# Patient Record
Sex: MALE | Race: WHITE | NOT HISPANIC OR LATINO | Employment: FULL TIME | URBAN - METROPOLITAN AREA
[De-identification: names, ages, dates, MRNs, and addresses within clinical notes are randomized per-mention and may not be internally consistent; named-entity substitution may affect disease eponyms.]

---

## 2021-05-20 LAB — CRC RECOMMENDATION EXT: NORMAL

## 2024-04-21 ENCOUNTER — HOSPITAL ENCOUNTER (INPATIENT)
Facility: HOSPITAL | Age: 64
LOS: 1 days | Discharge: HOME | DRG: 065 | End: 2024-04-22
Attending: STUDENT IN AN ORGANIZED HEALTH CARE EDUCATION/TRAINING PROGRAM | Admitting: PSYCHIATRY & NEUROLOGY
Payer: MEDICARE

## 2024-04-21 DIAGNOSIS — R29.818 ACUTE FOCAL NEUROLOGICAL DEFICIT: ICD-10-CM

## 2024-04-21 DIAGNOSIS — I63.81 LACUNAR INFARCT, ACUTE: Primary | ICD-10-CM

## 2024-04-21 DIAGNOSIS — I63.9 STROKE: ICD-10-CM

## 2024-04-21 PROBLEM — F17.200 TOBACCO USE DISORDER, SEVERE, DEPENDENCE: Status: ACTIVE | Noted: 2020-12-07

## 2024-04-21 PROBLEM — I10 ESSENTIAL HYPERTENSION: Status: ACTIVE | Noted: 2023-02-06

## 2024-04-21 PROBLEM — R00.2 PALPITATIONS: Status: ACTIVE | Noted: 2023-02-06

## 2024-04-21 PROBLEM — Z72.0 TOBACCO USE: Status: ACTIVE | Noted: 2024-02-22

## 2024-04-21 PROBLEM — I48.0 PAROXYSMAL ATRIAL FIBRILLATION: Status: ACTIVE | Noted: 2023-04-21

## 2024-04-21 LAB
ALBUMIN SERPL BCP-MCNC: 3.9 G/DL (ref 3.5–5.2)
ALP SERPL-CCNC: 71 U/L (ref 55–135)
ALT SERPL W/O P-5'-P-CCNC: 14 U/L (ref 10–44)
ANION GAP SERPL CALC-SCNC: 13 MMOL/L (ref 8–16)
APTT PPP: 31.9 SEC (ref 21–32)
AST SERPL-CCNC: 21 U/L (ref 10–40)
BASOPHILS # BLD AUTO: 0.06 K/UL (ref 0–0.2)
BASOPHILS NFR BLD: 0.8 % (ref 0–1.9)
BILIRUB SERPL-MCNC: 0.3 MG/DL (ref 0.1–1)
BUN SERPL-MCNC: 20 MG/DL (ref 8–23)
CALCIUM SERPL-MCNC: 9.6 MG/DL (ref 8.7–10.5)
CHLORIDE SERPL-SCNC: 106 MMOL/L (ref 95–110)
CHOLEST SERPL-MCNC: 216 MG/DL (ref 120–199)
CHOLEST/HDLC SERPL: 4.2 {RATIO} (ref 2–5)
CO2 SERPL-SCNC: 22 MMOL/L (ref 23–29)
CREAT SERPL-MCNC: 1.1 MG/DL (ref 0.5–1.4)
DIFFERENTIAL METHOD BLD: NORMAL
EOSINOPHIL # BLD AUTO: 0.3 K/UL (ref 0–0.5)
EOSINOPHIL NFR BLD: 4.2 % (ref 0–8)
ERYTHROCYTE [DISTWIDTH] IN BLOOD BY AUTOMATED COUNT: 12.7 % (ref 11.5–14.5)
EST. GFR  (NO RACE VARIABLE): >60 ML/MIN/1.73 M^2
GLUCOSE SERPL-MCNC: 79 MG/DL (ref 70–110)
HCT VFR BLD AUTO: 50.5 % (ref 40–54)
HCV AB SERPL QL IA: NORMAL
HDLC SERPL-MCNC: 51 MG/DL (ref 40–75)
HDLC SERPL: 23.6 % (ref 20–50)
HGB BLD-MCNC: 17.7 G/DL (ref 14–18)
HIV 1+2 AB+HIV1 P24 AG SERPL QL IA: NORMAL
IMM GRANULOCYTES # BLD AUTO: 0.02 K/UL (ref 0–0.04)
IMM GRANULOCYTES NFR BLD AUTO: 0.3 % (ref 0–0.5)
LDLC SERPL CALC-MCNC: 114 MG/DL (ref 63–159)
LYMPHOCYTES # BLD AUTO: 2.2 K/UL (ref 1–4.8)
LYMPHOCYTES NFR BLD: 29.9 % (ref 18–48)
MCH RBC QN AUTO: 30.5 PG (ref 27–31)
MCHC RBC AUTO-ENTMCNC: 35 G/DL (ref 32–36)
MCV RBC AUTO: 87 FL (ref 82–98)
MONOCYTES # BLD AUTO: 0.7 K/UL (ref 0.3–1)
MONOCYTES NFR BLD: 10.1 % (ref 4–15)
NEUTROPHILS # BLD AUTO: 3.9 K/UL (ref 1.8–7.7)
NEUTROPHILS NFR BLD: 54.7 % (ref 38–73)
NONHDLC SERPL-MCNC: 165 MG/DL
NRBC BLD-RTO: 0 /100 WBC
PLATELET # BLD AUTO: 206 K/UL (ref 150–450)
PMV BLD AUTO: 12 FL (ref 9.2–12.9)
POTASSIUM SERPL-SCNC: 3.8 MMOL/L (ref 3.5–5.1)
PROT SERPL-MCNC: 7.8 G/DL (ref 6–8.4)
RBC # BLD AUTO: 5.8 M/UL (ref 4.6–6.2)
SODIUM SERPL-SCNC: 141 MMOL/L (ref 136–145)
TRIGL SERPL-MCNC: 255 MG/DL (ref 30–150)
TSH SERPL DL<=0.005 MIU/L-ACNC: 0.5 UIU/ML (ref 0.4–4)
WBC # BLD AUTO: 7.2 K/UL (ref 3.9–12.7)

## 2024-04-21 PROCEDURE — 86803 HEPATITIS C AB TEST: CPT | Performed by: PHYSICIAN ASSISTANT

## 2024-04-21 PROCEDURE — 87389 HIV-1 AG W/HIV-1&-2 AB AG IA: CPT | Performed by: PHYSICIAN ASSISTANT

## 2024-04-21 PROCEDURE — 25000003 PHARM REV CODE 250

## 2024-04-21 PROCEDURE — 11000001 HC ACUTE MED/SURG PRIVATE ROOM

## 2024-04-21 PROCEDURE — 85730 THROMBOPLASTIN TIME PARTIAL: CPT | Performed by: PHYSICIAN ASSISTANT

## 2024-04-21 PROCEDURE — 80061 LIPID PANEL: CPT | Performed by: PHYSICIAN ASSISTANT

## 2024-04-21 PROCEDURE — 99223 1ST HOSP IP/OBS HIGH 75: CPT | Mod: ,,, | Performed by: PSYCHIATRY & NEUROLOGY

## 2024-04-21 PROCEDURE — 99285 EMERGENCY DEPT VISIT HI MDM: CPT | Mod: 25

## 2024-04-21 PROCEDURE — 93005 ELECTROCARDIOGRAM TRACING: CPT

## 2024-04-21 PROCEDURE — 85025 COMPLETE CBC W/AUTO DIFF WBC: CPT | Performed by: PHYSICIAN ASSISTANT

## 2024-04-21 PROCEDURE — 80053 COMPREHEN METABOLIC PANEL: CPT | Performed by: PHYSICIAN ASSISTANT

## 2024-04-21 PROCEDURE — 25000003 PHARM REV CODE 250: Performed by: PHYSICIAN ASSISTANT

## 2024-04-21 PROCEDURE — 93010 ELECTROCARDIOGRAM REPORT: CPT | Mod: ,,, | Performed by: INTERNAL MEDICINE

## 2024-04-21 PROCEDURE — 84443 ASSAY THYROID STIM HORMONE: CPT | Performed by: PHYSICIAN ASSISTANT

## 2024-04-21 PROCEDURE — S4991 NICOTINE PATCH NONLEGEND: HCPCS | Performed by: PHYSICIAN ASSISTANT

## 2024-04-21 RX ORDER — VARENICLINE TARTRATE 0.5 (11)-1
KIT ORAL
Status: ON HOLD | COMMUNITY
Start: 2024-02-22 | End: 2024-04-22 | Stop reason: HOSPADM

## 2024-04-21 RX ORDER — LISINOPRIL 5 MG/1
10 TABLET ORAL DAILY
Status: DISCONTINUED | OUTPATIENT
Start: 2024-04-22 | End: 2024-04-22 | Stop reason: HOSPADM

## 2024-04-21 RX ORDER — METOPROLOL SUCCINATE 25 MG/1
12.5 TABLET, EXTENDED RELEASE ORAL
COMMUNITY
End: 2024-05-13 | Stop reason: SDUPTHER

## 2024-04-21 RX ORDER — BISACODYL 10 MG/1
10 SUPPOSITORY RECTAL DAILY PRN
Status: DISCONTINUED | OUTPATIENT
Start: 2024-04-21 | End: 2024-04-22 | Stop reason: HOSPADM

## 2024-04-21 RX ORDER — APIXABAN 5 MG/1
5 TABLET, FILM COATED ORAL 2 TIMES DAILY
COMMUNITY
End: 2024-05-13 | Stop reason: SDUPTHER

## 2024-04-21 RX ORDER — IBUPROFEN 200 MG
1 TABLET ORAL
Status: DISCONTINUED | OUTPATIENT
Start: 2024-04-21 | End: 2024-04-21

## 2024-04-21 RX ORDER — CALCIUM CARBONATE 200(500)MG
500 TABLET,CHEWABLE ORAL 3 TIMES DAILY PRN
Status: DISCONTINUED | OUTPATIENT
Start: 2024-04-21 | End: 2024-04-22 | Stop reason: HOSPADM

## 2024-04-21 RX ORDER — LABETALOL HCL 20 MG/4 ML
10 SYRINGE (ML) INTRAVENOUS
Status: DISCONTINUED | OUTPATIENT
Start: 2024-04-21 | End: 2024-04-22 | Stop reason: HOSPADM

## 2024-04-21 RX ORDER — SODIUM CHLORIDE 0.9 % (FLUSH) 0.9 %
10 SYRINGE (ML) INJECTION
Status: DISCONTINUED | OUTPATIENT
Start: 2024-04-21 | End: 2024-04-22 | Stop reason: HOSPADM

## 2024-04-21 RX ORDER — LISINOPRIL 10 MG/1
10 TABLET ORAL
COMMUNITY
End: 2024-05-13 | Stop reason: SDUPTHER

## 2024-04-21 RX ORDER — AMOXICILLIN 250 MG
1 CAPSULE ORAL 2 TIMES DAILY
Status: DISCONTINUED | OUTPATIENT
Start: 2024-04-21 | End: 2024-04-22 | Stop reason: HOSPADM

## 2024-04-21 RX ORDER — ATORVASTATIN CALCIUM 40 MG/1
80 TABLET, FILM COATED ORAL DAILY
Status: DISCONTINUED | OUTPATIENT
Start: 2024-04-22 | End: 2024-04-22 | Stop reason: HOSPADM

## 2024-04-21 RX ORDER — POLYETHYLENE GLYCOL 3350 17 G/17G
17 POWDER, FOR SOLUTION ORAL DAILY
Status: DISCONTINUED | OUTPATIENT
Start: 2024-04-22 | End: 2024-04-22 | Stop reason: HOSPADM

## 2024-04-21 RX ORDER — IBUPROFEN 200 MG
1 TABLET ORAL DAILY
Status: DISCONTINUED | OUTPATIENT
Start: 2024-04-21 | End: 2024-04-22 | Stop reason: HOSPADM

## 2024-04-21 RX ADMIN — APIXABAN 5 MG: 5 TABLET, FILM COATED ORAL at 11:04

## 2024-04-21 RX ADMIN — Medication 1 PATCH: at 06:04

## 2024-04-21 RX ADMIN — DOCUSATE SODIUM AND SENNOSIDES 1 TABLET: 8.6; 5 TABLET, FILM COATED ORAL at 11:04

## 2024-04-21 NOTE — ED NOTES
Pt's wife asked for a nicotine patch order for the pt. Pt had one applied prior to arriving at the facility but it was removed during MRI scan. MD notified.

## 2024-04-21 NOTE — ED TRIAGE NOTES
Osiel Kerr, a 64 y.o. male presents to the ED w/ complaint of slurred speech. Pt has been experiencing slurred speach along with left side facial droop since Friday morning. Pt states he think he may have had a stroke or something similar in his sleep the night before. Pt also complains of dizzy spells that have been occurring over the past couple of months. Pt states at moments of rest his heart does just start racing without warning and he has also had a feel episodes of syncope with LOC. Pt currently denies any pain, headache, extremity weakness, chest pain or shortness of breath.     Triage note:  Chief Complaint   Patient presents with    slurred speech     Noticed his speech was different Friday morning when he woke up and daughter noticed the left side of his face was drooping today when talking to her     Review of patient's allergies indicates:   Allergen Reactions    Penicillin Other (See Comments)     As a child     Past Medical History:   Diagnosis Date    Essential (primary) hypertension     Paroxysmal A-fib

## 2024-04-21 NOTE — HPI
Osiel Kerr is a 64 y.o. male with PMH of HTN, tobacco use, and Afib (on eliquis) that presented to the ED with left side facial droop, and slurred speech on 4/21. LKN 4/19, approx >48 hrs PTA. Vascular Neurology consulted for right acute lacunar infarct found on MRI Brain w/o Contrast.

## 2024-04-21 NOTE — H&P
[facility] - Emergency Dept  Vascular Neurology  [facility]  History & Physical    Inpatient consult to Vascular Neurology  Consult performed by: Kika Garcia NP  Consult ordered by: Kailey Douglas PA-C  Reason for consult: R lacunar infarct in corona radiata        Assessment/Plan:     Patient is a 64 y.o. year old male with:    Right-sided lacunar infarction  Osiel Speed is a 64 y.o. male with PMH of HTN, Afib (on eliquis) that presented to the ED with left side facial droop, and slurred speech on 4/21. LKN 4/19, approx >48 hrs PTA. Vascular Neurology consulted for right acute lacunar infarct found on MRI Brain w/o Contrast.     Exam significant for slight facial droop and slurred speech that is detected by wife and daughter (at bedside) when asked to compare patient's current facial movements and articulation to 2 weeks ago. NIHSS 3. MRI Brain w/o Contrast with an acute right lacunar type infarct to the corona radiata. Not candidate for acute interventions, OOW for TNK and no LVO for thrombectomy. Patient will be admitted to vascular neurology service/NPU for further evaluation and stroke workup.    Antithrombotics for secondary stroke prevention: Anticoagulants: Apixaban 5 mg BID     Statins for secondary stroke prevention and hyperlipidemia, if present:   Statins: Atorvastatin- 80 mg daily    Aggressive risk factor modification: HTN, Smoking, HLD, A-Fib     Rehab efforts: The patient has been evaluated by a stroke team provider and the therapy needs have been fully considered based off the presenting complaints and exam findings. The following therapy evaluations are needed: SLP evaluate and treat    Diagnostics ordered/pending: HgbA1C to assess blood glucose levels, TTE to assess cardiac function/status , TSH to assess thyroid function    VTE prophylaxis: Mechanical prophylaxis: Place SCDs  None: Reason for No Pharmacological VTE Prophylaxis: Currently on anticoagulation    BP  parameters: Infarct: No intervention, SBP <220            STROKE DOCUMENTATION     Acute Stroke Times   Last Known Normal Date: 04/18/24  Unknown Normal Time: Unknown Time    NIH Scale:  1a. Level of Consciousness: 0-->Alert, keenly responsive  1b. LOC Questions: 0-->Answers both questions correctly  1c. LOC Commands: 0-->Performs both tasks correctly  2. Best Gaze: 0-->Normal  3. Visual: 0-->No visual loss  4. Facial Palsy: 1-->Minor paralysis (flattened nasolabial fold, asymmetry on smiling)  5a. Motor Arm, Left: 0-->No drift, limb holds 90 (or 45) degrees for full 10 secs  5b. Motor Arm, Right: 0-->No drift, limb holds 90 (or 45) degrees for full 10 secs  6a. Motor Leg, Left: 0-->No drift, leg holds 30 degree position for full 5 secs  6b. Motor Leg, Right: 0-->No drift, leg holds 30 degree position for full 5 secs  7. Limb Ataxia: 0-->Absent  8. Sensory: 0-->Normal, no sensory loss  9. Best Language: 0-->No aphasia, normal  10. Dysarthria: 1-->Mild-to-moderate dysarthria, patient slurs at least some words and, at worst, can be understood with some difficulty  11. Extinction and Inattention (formerly Neglect): 0-->No abnormality  Total (NIH Stroke Scale): 2     Modified Rapidan    Clayton Coma Scale:    ABCD2 Score:    [id number] Score:   HAS -BLED Score:   ICH Score:   Hunt & Caceres Classification:      Thrombolysis Candidate? No, Out of window - Symptom onset > 4.5 hours    Delays to Thrombolysis?  Not Applicable    Interventional Revascularization Candidate?   Is the patient eligible for mechanical endovascular reperfusion (MARTHA)?  No; at this time symptoms not suggestive of large vessel occlusion    Delays to Thrombectomy? Not Applicable    Hemorrhagic change of an Ischemic Stroke: Does this patient have an ischemic stroke with hemorrhagic changes? No         Subjective:     History of Present Illness:  Osiel Kerr is a 64 y.o. male with PMH of HTN, tobacco use, and Afib (on eliquis) that presented to the ED  with left side facial droop, and slurred speech on 4/21. LKN 4/19, approx >48 hrs PTA. Vascular Neurology consulted for right acute lacunar infarct found on MRI Brain w/o Contrast.               Past Medical History:   Diagnosis Date    Essential (primary) hypertension     Paroxysmal A-fib      No past surgical history on file.  Social History     Tobacco Use    Smoking status: Former     Types: Cigarettes    Smokeless tobacco: Current     Review of patient's allergies indicates:   Allergen Reactions    Penicillin Other (See Comments)     As a child       Medications: I have reviewed the current medication administration record.    No current facility-administered medications on file prior to encounter.     Current Outpatient Medications on File Prior to Encounter   Medication Sig Dispense Refill    varenicline (CHANTIX EFRAIN) 0.5 mg (11)- 1 mg (42) tablet Take one 0.5mg tablet by mouth once daily for 3 days, then one 0.5mg tablet twice daily. then increase to one 1mg tablet twice daily.      ELIQUIS 5 mg Tab Take 5 mg by mouth 2 (two) times daily.      lisinopriL 10 MG tablet Take 10 mg by mouth.      metoprolol succinate (TOPROL-XL) 25 MG 24 hr tablet Take 12.5 mg by mouth.             Review of Systems   Respiratory: Negative.     Cardiovascular: Negative.    Neurological:  Positive for facial asymmetry and speech difficulty. Negative for dizziness, weakness and headaches.     Objective:     Vital Signs (Most Recent):  Temp: 97.4 °F (36.3 °C) (04/21/24 1047)  Pulse: (!) 59 (04/21/24 1500)  Resp: 20 (04/21/24 1500)  BP: 136/89 (04/21/24 1500)  SpO2: 99 % (04/21/24 1500)    Vital Signs Range (Last 24H):  Temp:  [97.4 °F (36.3 °C)]   Pulse:  [59-76]   Resp:  [16-20]   BP: (128-151)/(89-95)   SpO2:  [99 %-100 %]        Physical Exam  Vitals and nursing note reviewed.   Constitutional:       Appearance: Normal appearance.   HENT:      Head: Normocephalic and atraumatic.      Mouth/Throat:      Mouth: Mucous membranes  "are dry.   Eyes:      Extraocular Movements: Extraocular movements intact.   Pulmonary:      Effort: Pulmonary effort is normal.   Skin:     General: Skin is warm.   Neurological:      Mental Status: He is alert and oriented to person, place, and time.   Psychiatric:         Mood and Affect: Mood normal.              Neurological Exam:   LOC: alert  Attention Span: Good   Language: No aphasia  Articulation: slight dysarthria noted by wife and daughter when compared to patient's articulation 2 weeks ago  Orientation: Person, Place, Time   Visual Fields: Full  Facial Sensation (CN V): Normal  Facial Movement (CN VII): Lower facial weakness on the Left  Motor: Arm left  Normal 5/5  Leg left  Normal 5/5  Arm right  Normal 5/5  Leg right Normal 5/5  Cerebellum: No evidence of appendicular or axial ataxia  Sensation: intact to light touch bilaterally  Tone: Normal tone throughout      Laboratory:  CMP:   Recent Labs   Lab 04/21/24  1221   CALCIUM 9.6   ALBUMIN 3.9   PROT 7.8      K 3.8   CO2 22*      BUN 20   CREATININE 1.1   ALKPHOS 71   ALT 14   AST 21   BILITOT 0.3     CBC:   Recent Labs   Lab 04/21/24  1221   WBC 7.20   RBC 5.80   HGB 17.7   HCT 50.5      MCV 87   MCH 30.5   MCHC 35.0     Lipid Panel:   Recent Labs   Lab 04/21/24  1221   CHOL 216*   LDLCALC 114.0   HDL 51   TRIG 255*     Coagulation:   Recent Labs   Lab 04/21/24  1221   APTT 31.9     Hgb A1C: No results for input(s): "HGBA1C" in the last 168 hours.  TSH:   Recent Labs   Lab 04/21/24  1221   TSH 0.499       Diagnostic Results:      Brain imaging:  MRI Brain w/o Contrast 4/21/24  Impression:  Right corona radiata acute lacunar type infarct.  No intracranial hemorrhage or major vascular distribution infarct.  Changes of mild chronic microvascular ischemic disease and cerebral volume loss with few remote right-sided lacunar type infarcts.    Vessel Imaging:  MRA Neck 4/21/24  Impression:  No significant arterial abnormalities.    MRA " Brain 4/21/24  Impression:  No significant arterial abnormalities.    Cardiac Evaluation:   ECHO  Pending  EKG 4/21/24  Normal Sinus Rhythm        Kika Garcia NP  [facility]  Department of Vascular Neurology   [facility] - Emergency Dept

## 2024-04-21 NOTE — ASSESSMENT & PLAN NOTE
Osiel Kerr is a 64 y.o. male with PMH of HTN, Afib (on eliquis) that presented to the ED with left side facial droop, and slurred speech on 4/21. LKN 4/19, approx >48 hrs PTA. Vascular Neurology consulted for right acute lacunar infarct found on MRI Brain w/o Contrast.     Exam significant for slight facial droop and slurred speech that is detected by wife and daughter (at bedside) when asked to compare patient's current facial movements and articulation to 2 weeks ago. NIHSS 3. MRI Brain w/o Contrast with an acute right lacunar type infarct to the corona radiata. Not candidate for acute interventions, OOW for TNK and no LVO for thrombectomy. Patient will be admitted to vascular neurology service/NPU for further evaluation and stroke workup.    Antithrombotics for secondary stroke prevention: Anticoagulants: Apixaban 5 mg BID     Statins for secondary stroke prevention and hyperlipidemia, if present:   Statins: Atorvastatin- 80 mg daily    Aggressive risk factor modification: HTN, Smoking, HLD, A-Fib     Rehab efforts: The patient has been evaluated by a stroke team provider and the therapy needs have been fully considered based off the presenting complaints and exam findings. The following therapy evaluations are needed: SLP evaluate and treat    Diagnostics ordered/pending: HgbA1C to assess blood glucose levels, TTE to assess cardiac function/status , TSH to assess thyroid function    VTE prophylaxis: Mechanical prophylaxis: Place SCDs  None: Reason for No Pharmacological VTE Prophylaxis: Currently on anticoagulation    BP parameters: Infarct: No intervention, SBP <220

## 2024-04-21 NOTE — ED PROVIDER NOTES
"Encounter Date: 4/21/2024       History     Chief Complaint   Patient presents with    slurred speech     Noticed his speech was different Friday morning when he woke up and daughter noticed the left side of his face was drooping today when talking to her     64-year-old male with history of hypertension and paroxysmal AFib on Eliquis presents for left-sided facial droop and slurred speech which started 2 days ago on Friday.  States that he felt as if he had gone to the dentist with slight slurring of his speech.  He spoke with a friend on the phone who noted his speech sounded slurred, however when he spoke to his wife later she did not notice any abnormalities.  He noticed a facial droop but initially attributed this to sleeping strangely.  Daughter states that his face looked asymmetric today which prompted her to bring him to the ED for evaluation.  He did have an episode of blurred vision the day prior to the onset of his symptoms where he was not able to read the number signs at the gas station.  Denies headache, numbness, weakness, palpitations or other complaint.  He does report 3 episodes of "blackouts" over the past year and a half and apparently was told that he had an abnormal heart rhythm at [facility].  Reports compliance with his Eliquis, metoprolol, lisinopril.      Review of patient's allergies indicates:   Allergen Reactions    Penicillin Other (See Comments)     As a child     Past Medical History:   Diagnosis Date    Essential (primary) hypertension     Paroxysmal A-fib      No past surgical history on file.  No family history on file.  Social History     Tobacco Use    Smoking status: Former     Types: Cigarettes    Smokeless tobacco: Current     Review of Systems    Physical Exam     Initial Vitals [04/21/24 1047]   BP Pulse Resp Temp SpO2   (!) 128/95 76 16 97.4 °F (36.3 °C) 100 %      MAP       --         Physical Exam    Nursing note and vitals reviewed.  Constitutional: He " appears well-developed and well-nourished. He is not diaphoretic. No distress.   HENT:   Head: Normocephalic and atraumatic.   Eyes: EOM are normal. Pupils are equal, round, and reactive to light.   Neck: Neck supple.   Normal range of motion.  Cardiovascular:  Normal rate, regular rhythm, normal heart sounds and intact distal pulses.     Exam reveals no gallop and no friction rub.       No murmur heard.  Pulmonary/Chest: Breath sounds normal. No respiratory distress. He has no wheezes. He has no rhonchi. He has no rales. He exhibits no tenderness.   Musculoskeletal:         General: Normal range of motion.      Cervical back: Normal range of motion and neck supple.     Neurological: He is alert and oriented to person, place, and time. He has normal strength. No sensory deficit. GCS score is 15. GCS eye subscore is 4. GCS verbal subscore is 5. GCS motor subscore is 6.   Very subtle left-sided facial droop, speech is very slightly slurred.  Otherwise CN II-XII grossly intact   Skin: Skin is warm and dry.   Psychiatric: He has a normal mood and affect.         ED Course   Procedures  Labs Reviewed   COMPREHENSIVE METABOLIC PANEL - Abnormal; Notable for the following components:       Result Value    CO2 22 (*)     All other components within normal limits   LIPID PANEL - Abnormal; Notable for the following components:    Cholesterol 216 (*)     Triglycerides 255 (*)     All other components within normal limits   HIV 1 / 2 ANTIBODY    Narrative:     Release to patient->Immediate   HEPATITIS C ANTIBODY    Narrative:     Release to patient->Immediate   CBC W/ AUTO DIFFERENTIAL   TSH   APTT   HEMOGLOBIN A1C     EKG Readings: (Independently Interpreted)   Initial Reading: No STEMI. Rhythm: Normal Sinus Rhythm. Heart Rate: 67. Ectopy: No Ectopy. ST Segments: Normal ST Segments. T Waves Flipped: V3, V5 and V4. Clinical Impression: Normal Sinus Rhythm       Imaging Results              MRA Neck without contrast (Final  result)  Result time 04/21/24 13:44:21      Final result by Samir Rousseau MD (04/21/24 13:44:21)                   Impression:      No significant arterial abnormalities.      Electronically signed by: Samir Rousseau MD  Date:    04/21/2024  Time:    13:44               Narrative:    EXAMINATION:  MRA BRAIN WITHOUT CONTRAST; MRA NECK WITHOUT CONTRAST    CLINICAL HISTORY:  Neuro deficit, acute, stroke suspected;    TECHNIQUE:  Noncontrast MR angiography was performed of the intracranial vasculature with 3D time-of-flight technique through the [place] [facility] [place], with MIP reformatting.  Noncontrast 3D time-of-flight MR angiography of the neck was performed, with MIP reformatting.    COMPARISON:  MRI brain same date    FINDINGS:  Patient motion artifact degrades some sequences.    Aorta: Left-sided 3 vessel arch.    Extracranial carotid circulation: No hemodynamically significant stenosis, aneurysmal dilatation, or dissection.    Extracranial vertebral circulation: Left slightly dominant.  No hemodynamically significant stenosis, aneurysmal dilatation, or dissection.    Intracranial Arteries: No focal high-grade stenosis, occlusion, or aneurysm.  Bilateral P comms and A-comm not identified and likely markedly hypoplastic or absent.                                       MRA Brain without contrast (Final result)  Result time 04/21/24 13:44:21      Final result by Samir Rousseau MD (04/21/24 13:44:21)                   Impression:      No significant arterial abnormalities.      Electronically signed by: Samir Rousseau MD  Date:    04/21/2024  Time:    13:44               Narrative:    EXAMINATION:  MRA BRAIN WITHOUT CONTRAST; MRA NECK WITHOUT CONTRAST    CLINICAL HISTORY:  Neuro deficit, acute, stroke suspected;    TECHNIQUE:  Noncontrast MR angiography was performed of the intracranial vasculature with 3D time-of-flight technique through the [place] [facility] [place], with MIP reformatting.  Noncontrast 3D time-of-flight MR  angiography of the neck was performed, with MIP reformatting.    COMPARISON:  MRI brain same date    FINDINGS:  Patient motion artifact degrades some sequences.    Aorta: Left-sided 3 vessel arch.    Extracranial carotid circulation: No hemodynamically significant stenosis, aneurysmal dilatation, or dissection.    Extracranial vertebral circulation: Left slightly dominant.  No hemodynamically significant stenosis, aneurysmal dilatation, or dissection.    Intracranial Arteries: No focal high-grade stenosis, occlusion, or aneurysm.  Bilateral P comms and A-comm not identified and likely markedly hypoplastic or absent.                                       MRI Brain Without Contrast (Final result)  Result time 04/21/24 13:39:56      Final result by Samir Rousseau MD (04/21/24 13:39:56)                   Impression:      Right corona radiata acute lacunar type infarct.  No intracranial hemorrhage or major vascular distribution infarct.    Changes of mild chronic microvascular ischemic disease and cerebral volume loss with few remote right-sided lacunar type infarcts.      Electronically signed by: Samir Rousseau MD  Date:    04/21/2024  Time:    13:39               Narrative:    EXAMINATION:  MRI BRAIN WITHOUT CONTRAST    CLINICAL HISTORY:  Acute focal neurological deficit;.    TECHNIQUE:  Multiplanar multisequence MR imaging of the brain was performed without contrast.    COMPARISON:  None    FINDINGS:  Intracranial compartment: Brain appears normally formed.    Age-related mild generalized cerebral volume loss.  The ventricles are midline without distortion by mass effect or acute hydrocephalus.  No extra-axial blood or fluid collections.    Small focus of restricted diffusion at the right corona radiata consistent with acute lacunar type infarct.  Mild periventricular nonspecific T2/FLAIR hyperintense signal consistent with chronic microvascular ischemic change.  Few remote small lacunar type infarcts at the right  thalamus and peripheral right basal ganglia/external capsule.  No mass lesion, acute hemorrhage, edema or major vascular distribution infarct.    Normal vascular flow voids are preserved.    Skull/extracranial contents (limited evaluation): Bone marrow signal intensity is normal.  Craniocervical junction is within normal limits.  Minimal patchy mucosal thickening of the ethmoidal air cells.  Mastoid air cells appear clear.  Bilateral orbits are within normal limits.                                       Medications   apixaban tablet 5 mg (has no administration in time range)   lisinopriL tablet 10 mg (has no administration in time range)   metoprolol succinate (TOPROL-XL) 24 hr split tablet 12.5 mg (has no administration in time range)   sodium chloride 0.9% flush 10 mL (has no administration in time range)   sodium chloride 0.9% bolus 500 mL 500 mL (has no administration in time range)   labetalol 20 mg/4 mL (5 mg/mL) IV syring (has no administration in time range)   atorvastatin tablet 80 mg (has no administration in time range)   polyethylene glycol packet 17 g (has no administration in time range)   senna-docusate 8.6-50 mg per tablet 1 tablet (has no administration in time range)   bisacodyL suppository 10 mg (has no administration in time range)   calcium carbonate 200 mg calcium (500 mg) chewable tablet 500 mg (has no administration in time range)     Medical Decision Making  64-year-old male presenting for left-sided facial droop and slurred speech for 2 days.  /95, vitals otherwise normal.  He does have subtle facial droop on the left side as well as slightly slurred speech with an otherwise grossly normal neurologic exam.    Differential diagnosis:  Subacute stroke   Doubt TIA as symptoms are persistent   Low suspicion for metabolic or infectious cause    Will check labs, obtain MRI/MRA and reassess.    MRI shows small lacunar infarct.  Case discussed with vascular Neurology who evaluated the patient  and will admit to their service for further management.  Patient and family comfortable with admission.  I discussed this patient with my supervising physician.        Amount and/or Complexity of Data Reviewed  Labs: ordered.  Radiology: ordered. Decision-making details documented in ED Course.  ECG/medicine tests: ordered and independent interpretation performed.    Risk  Decision regarding hospitalization.               ED Course as of 04/21/24 1727   Sun Apr 21, 2024   1346 MRI Brain Without Contrast  Small lacunar infarct.  Will consult vascular Neurology [CC]   1349 Case discussed with vascular Neurology who will evaluate the patient [CC]      ED Course User Index  [CC] Kailey Douglas PA-C                           Clinical Impression:  Final diagnoses:  [R29.818] Acute focal neurological deficit  [I63.81] Lacunar infarct, acute (Primary)          ED Disposition Condition    Admit Stable                Kailey Douglas PA-C  04/21/24 1727

## 2024-04-21 NOTE — ED NOTES
Called report to [facility]. Unit sec stated she would send a telebox to tube station #[phone] for the pt.

## 2024-04-21 NOTE — SUBJECTIVE & OBJECTIVE
Past Medical History:   Diagnosis Date    Essential (primary) hypertension     Paroxysmal A-fib      No past surgical history on file.  Social History     Tobacco Use    Smoking status: Former     Types: Cigarettes    Smokeless tobacco: Current     Review of patient's allergies indicates:   Allergen Reactions    Penicillin Other (See Comments)     As a child       Medications: I have reviewed the current medication administration record.    No current facility-administered medications on file prior to encounter.     Current Outpatient Medications on File Prior to Encounter   Medication Sig Dispense Refill    varenicline (CHANTIX EFRAIN) 0.5 mg (11)- 1 mg (42) tablet Take one 0.5mg tablet by mouth once daily for 3 days, then one 0.5mg tablet twice daily. then increase to one 1mg tablet twice daily.      ELIQUIS 5 mg Tab Take 5 mg by mouth 2 (two) times daily.      lisinopriL 10 MG tablet Take 10 mg by mouth.      metoprolol succinate (TOPROL-XL) 25 MG 24 hr tablet Take 12.5 mg by mouth.             Review of Systems   Respiratory: Negative.     Cardiovascular: Negative.    Neurological:  Positive for facial asymmetry and speech difficulty. Negative for dizziness, weakness and headaches.     Objective:     Vital Signs (Most Recent):  Temp: 97.4 °F (36.3 °C) (04/21/24 1047)  Pulse: (!) 59 (04/21/24 1500)  Resp: 20 (04/21/24 1500)  BP: 136/89 (04/21/24 1500)  SpO2: 99 % (04/21/24 1500)    Vital Signs Range (Last 24H):  Temp:  [97.4 °F (36.3 °C)]   Pulse:  [59-76]   Resp:  [16-20]   BP: (128-151)/(89-95)   SpO2:  [99 %-100 %]        Physical Exam  Vitals and nursing note reviewed.   Constitutional:       Appearance: Normal appearance.   HENT:      Head: Normocephalic and atraumatic.      Mouth/Throat:      Mouth: Mucous membranes are dry.   Eyes:      Extraocular Movements: Extraocular movements intact.   Pulmonary:      Effort: Pulmonary effort is normal.   Skin:     General: Skin is warm.   Neurological:      Mental  "Status: He is alert and oriented to person, place, and time.   Psychiatric:         Mood and Affect: Mood normal.              Neurological Exam:   LOC: alert  Attention Span: Good   Language: No aphasia  Articulation: slight dysarthria noted by wife and daughter when compared to patient's articulation 2 weeks ago  Orientation: Person, Place, Time   Visual Fields: Full  Facial Sensation (CN V): Normal  Facial Movement (CN VII): Lower facial weakness on the Left  Motor: Arm left  Normal 5/5  Leg left  Normal 5/5  Arm right  Normal 5/5  Leg right Normal 5/5  Cerebellum: No evidence of appendicular or axial ataxia  Sensation: intact to light touch bilaterally  Tone: Normal tone throughout      Laboratory:  CMP:   Recent Labs   Lab 04/21/24  1221   CALCIUM 9.6   ALBUMIN 3.9   PROT 7.8      K 3.8   CO2 22*      BUN 20   CREATININE 1.1   ALKPHOS 71   ALT 14   AST 21   BILITOT 0.3     CBC:   Recent Labs   Lab 04/21/24  1221   WBC 7.20   RBC 5.80   HGB 17.7   HCT 50.5      MCV 87   MCH 30.5   MCHC 35.0     Lipid Panel:   Recent Labs   Lab 04/21/24  1221   CHOL 216*   LDLCALC 114.0   HDL 51   TRIG 255*     Coagulation:   Recent Labs   Lab 04/21/24  1221   APTT 31.9     Hgb A1C: No results for input(s): "HGBA1C" in the last 168 hours.  TSH:   Recent Labs   Lab 04/21/24  1221   TSH 0.499       Diagnostic Results:      Brain imaging:  MRI Brain w/o Contrast 4/21/24  Impression:  Right corona radiata acute lacunar type infarct.  No intracranial hemorrhage or major vascular distribution infarct.  Changes of mild chronic microvascular ischemic disease and cerebral volume loss with few remote right-sided lacunar type infarcts.    Vessel Imaging:  MRA Neck 4/21/24  Impression:  No significant arterial abnormalities.    MRA Brain 4/21/24  Impression:  No significant arterial abnormalities.    Cardiac Evaluation:   ECHO  Pending  EKG 4/21/24  Normal Sinus Rhythm    "

## 2024-04-22 ENCOUNTER — TELEPHONE (OUTPATIENT)
Dept: NEUROLOGY | Facility: HOSPITAL | Age: 64
End: 2024-04-22
Payer: MEDICARE

## 2024-04-22 VITALS
TEMPERATURE: 98 F | BODY MASS INDEX: 23.79 KG/M2 | DIASTOLIC BLOOD PRESSURE: 72 MMHG | WEIGHT: 157 LBS | SYSTOLIC BLOOD PRESSURE: 120 MMHG | HEART RATE: 63 BPM | HEIGHT: 68 IN | RESPIRATION RATE: 18 BRPM | OXYGEN SATURATION: 96 %

## 2024-04-22 PROBLEM — E78.2 HYPERLIPIDEMIA, MIXED: Status: ACTIVE | Noted: 2024-04-22

## 2024-04-22 PROBLEM — Z79.01 CHRONIC ANTICOAGULATION: Status: ACTIVE | Noted: 2024-04-22

## 2024-04-22 PROBLEM — R47.1 DYSARTHRIA DUE TO ACUTE STROKE: Status: ACTIVE | Noted: 2024-04-22

## 2024-04-22 PROBLEM — I63.9 DYSARTHRIA DUE TO ACUTE STROKE: Status: ACTIVE | Noted: 2024-04-22

## 2024-04-22 LAB
ALBUMIN SERPL BCP-MCNC: 3.8 G/DL (ref 3.5–5.2)
ALP SERPL-CCNC: 57 U/L (ref 55–135)
ALT SERPL W/O P-5'-P-CCNC: 12 U/L (ref 10–44)
ANION GAP SERPL CALC-SCNC: 9 MMOL/L (ref 8–16)
APTT PPP: 32.6 SEC (ref 21–32)
ASCENDING AORTA: 3.04 CM
AST SERPL-CCNC: 14 U/L (ref 10–40)
AV INDEX (PROSTH): 0.91
AV MEAN GRADIENT: 4 MMHG
AV PEAK GRADIENT: 7 MMHG
AV VALVE AREA BY VELOCITY RATIO: 2.28 CM²
AV VALVE AREA: 2.48 CM²
AV VELOCITY RATIO: 0.84
BASOPHILS # BLD AUTO: 0.07 K/UL (ref 0–0.2)
BASOPHILS NFR BLD: 0.9 % (ref 0–1.9)
BILIRUB SERPL-MCNC: 0.5 MG/DL (ref 0.1–1)
BSA FOR ECHO PROCEDURE: 1.85 M2
BUN SERPL-MCNC: 19 MG/DL (ref 8–23)
CALCIUM SERPL-MCNC: 9.9 MG/DL (ref 8.7–10.5)
CHLORIDE SERPL-SCNC: 105 MMOL/L (ref 95–110)
CO2 SERPL-SCNC: 24 MMOL/L (ref 23–29)
CREAT SERPL-MCNC: 1.2 MG/DL (ref 0.5–1.4)
CV ECHO LV RWT: 0.33 CM
DIFFERENTIAL METHOD BLD: NORMAL
DOP CALC AO PEAK VEL: 1.36 M/S
DOP CALC AO VTI: 22.8 CM
DOP CALC LVOT AREA: 2.7 CM2
DOP CALC LVOT DIAMETER: 1.86 CM
DOP CALC LVOT PEAK VEL: 1.14 M/S
DOP CALC LVOT STROKE VOLUME: 56.6 CM3
DOP CALCLVOT PEAK VEL VTI: 20.84 CM
E WAVE DECELERATION TIME: 173.9 MSEC
E/A RATIO: 1
E/E' RATIO: 5.71 M/S
ECHO LV POSTERIOR WALL: 0.7 CM (ref 0.6–1.1)
EJECTION FRACTION: 65 %
EOSINOPHIL # BLD AUTO: 0.3 K/UL (ref 0–0.5)
EOSINOPHIL NFR BLD: 3.9 % (ref 0–8)
ERYTHROCYTE [DISTWIDTH] IN BLOOD BY AUTOMATED COUNT: 12.7 % (ref 11.5–14.5)
EST. GFR  (NO RACE VARIABLE): >60 ML/MIN/1.73 M^2
ESTIMATED AVG GLUCOSE: 117 MG/DL (ref 68–131)
FRACTIONAL SHORTENING: 31 % (ref 28–44)
GLUCOSE SERPL-MCNC: 95 MG/DL (ref 70–110)
HBA1C MFR BLD: 5.7 % (ref 4–5.6)
HCT VFR BLD AUTO: 48.4 % (ref 40–54)
HGB BLD-MCNC: 16.3 G/DL (ref 14–18)
IMM GRANULOCYTES # BLD AUTO: 0.03 K/UL (ref 0–0.04)
IMM GRANULOCYTES NFR BLD AUTO: 0.4 % (ref 0–0.5)
INR PPP: 1.1 (ref 0.8–1.2)
INTERVENTRICULAR SEPTUM: 0.8 CM (ref 0.6–1.1)
LA MAJOR: 5.13 CM
LA MINOR: 4.9 CM
LA WIDTH: 3.62 CM
LEFT ATRIUM SIZE: 3.14 CM
LEFT ATRIUM VOLUME INDEX MOD: 27.2 ML/M2
LEFT ATRIUM VOLUME INDEX: 26.3 ML/M2
LEFT ATRIUM VOLUME MOD: 50.11 CM3
LEFT ATRIUM VOLUME: 48.43 CM3
LEFT INTERNAL DIMENSION IN SYSTOLE: 2.9 CM (ref 2.1–4)
LEFT VENTRICLE DIASTOLIC VOLUME INDEX: 42.98 ML/M2
LEFT VENTRICLE DIASTOLIC VOLUME: 79.08 ML
LEFT VENTRICLE MASS INDEX: 51 G/M2
LEFT VENTRICLE SYSTOLIC VOLUME INDEX: 16.9 ML/M2
LEFT VENTRICLE SYSTOLIC VOLUME: 31.06 ML
LEFT VENTRICULAR INTERNAL DIMENSION IN DIASTOLE: 4.2 CM (ref 3.5–6)
LEFT VENTRICULAR MASS: 93.04 G
LV LATERAL E/E' RATIO: 4.44 M/S
LV SEPTAL E/E' RATIO: 8 M/S
LYMPHOCYTES # BLD AUTO: 2.7 K/UL (ref 1–4.8)
LYMPHOCYTES NFR BLD: 33.3 % (ref 18–48)
MAGNESIUM SERPL-MCNC: 2 MG/DL (ref 1.6–2.6)
MCH RBC QN AUTO: 29.6 PG (ref 27–31)
MCHC RBC AUTO-ENTMCNC: 33.7 G/DL (ref 32–36)
MCV RBC AUTO: 88 FL (ref 82–98)
MONOCYTES # BLD AUTO: 0.7 K/UL (ref 0.3–1)
MONOCYTES NFR BLD: 9 % (ref 4–15)
MV A" WAVE DURATION": 9.13 MSEC
MV PEAK A VEL: 0.4 M/S
MV PEAK E VEL: 0.4 M/S
MV STENOSIS PRESSURE HALF TIME: 50.43 MS
MV VALVE AREA P 1/2 METHOD: 4.36 CM2
NEUTROPHILS # BLD AUTO: 4.3 K/UL (ref 1.8–7.7)
NEUTROPHILS NFR BLD: 52.5 % (ref 38–73)
NRBC BLD-RTO: 0 /100 WBC
OHS CV RV/LV RATIO: 0.92 CM
OHS QRS DURATION: 82 MS
OHS QTC CALCULATION: 407 MS
PHOSPHATE SERPL-MCNC: 3.4 MG/DL (ref 2.7–4.5)
PISA TR MAX VEL: 2.6 M/S
PLATELET # BLD AUTO: 196 K/UL (ref 150–450)
PMV BLD AUTO: 11.4 FL (ref 9.2–12.9)
POTASSIUM SERPL-SCNC: 4.9 MMOL/L (ref 3.5–5.1)
PROT SERPL-MCNC: 7.1 G/DL (ref 6–8.4)
PROTHROMBIN TIME: 11.6 SEC (ref 9–12.5)
PULM VEIN S/D RATIO: 1.94
PV PEAK D VEL: 0.33 M/S
PV PEAK S VEL: 0.64 M/S
RA MAJOR: 4.87 CM
RA PRESSURE ESTIMATED: 3 MMHG
RA WIDTH: 3.52 CM
RBC # BLD AUTO: 5.51 M/UL (ref 4.6–6.2)
RIGHT VENTRICULAR END-DIASTOLIC DIMENSION: 3.86 CM
RV TB RVSP: 6 MMHG
SINUS: 3.26 CM
SODIUM SERPL-SCNC: 138 MMOL/L (ref 136–145)
STJ: 2.66 CM
TDI LATERAL: 0.09 M/S
TDI SEPTAL: 0.05 M/S
TDI: 0.07 M/S
TR MAX PG: 27 MMHG
TRICUSPID ANNULAR PLANE SYSTOLIC EXCURSION: 2.08 CM
TROPONIN I SERPL DL<=0.01 NG/ML-MCNC: <0.006 NG/ML (ref 0–0.03)
TV REST PULMONARY ARTERY PRESSURE: 30 MMHG
WBC # BLD AUTO: 8.2 K/UL (ref 3.9–12.7)
Z-SCORE OF LEFT VENTRICULAR DIMENSION IN END DIASTOLE: -1.95
Z-SCORE OF LEFT VENTRICULAR DIMENSION IN END SYSTOLE: -0.66

## 2024-04-22 PROCEDURE — 80053 COMPREHEN METABOLIC PANEL: CPT

## 2024-04-22 PROCEDURE — 84100 ASSAY OF PHOSPHORUS: CPT

## 2024-04-22 PROCEDURE — 83735 ASSAY OF MAGNESIUM: CPT

## 2024-04-22 PROCEDURE — 36415 COLL VENOUS BLD VENIPUNCTURE: CPT

## 2024-04-22 PROCEDURE — 84484 ASSAY OF TROPONIN QUANT: CPT

## 2024-04-22 PROCEDURE — 85025 COMPLETE CBC W/AUTO DIFF WBC: CPT

## 2024-04-22 PROCEDURE — 97535 SELF CARE MNGMENT TRAINING: CPT

## 2024-04-22 PROCEDURE — 94761 N-INVAS EAR/PLS OXIMETRY MLT: CPT

## 2024-04-22 PROCEDURE — 36415 COLL VENOUS BLD VENIPUNCTURE: CPT | Performed by: NURSE PRACTITIONER

## 2024-04-22 PROCEDURE — 85610 PROTHROMBIN TIME: CPT

## 2024-04-22 PROCEDURE — 92523 SPEECH SOUND LANG COMPREHEN: CPT

## 2024-04-22 PROCEDURE — 83036 HEMOGLOBIN GLYCOSYLATED A1C: CPT | Performed by: NURSE PRACTITIONER

## 2024-04-22 PROCEDURE — 25000003 PHARM REV CODE 250

## 2024-04-22 PROCEDURE — 85730 THROMBOPLASTIN TIME PARTIAL: CPT

## 2024-04-22 PROCEDURE — 99233 SBSQ HOSP IP/OBS HIGH 50: CPT | Mod: ,,, | Performed by: PSYCHIATRY & NEUROLOGY

## 2024-04-22 PROCEDURE — S4991 NICOTINE PATCH NONLEGEND: HCPCS

## 2024-04-22 RX ORDER — ATORVASTATIN CALCIUM 80 MG/1
80 TABLET, FILM COATED ORAL DAILY
Qty: 90 TABLET | Refills: 3 | Status: SHIPPED | OUTPATIENT
Start: 2024-04-23 | End: 2025-04-23

## 2024-04-22 RX ADMIN — APIXABAN 5 MG: 5 TABLET, FILM COATED ORAL at 08:04

## 2024-04-22 RX ADMIN — METOPROLOL SUCCINATE 12.5 MG: 25 TABLET, EXTENDED RELEASE ORAL at 08:04

## 2024-04-22 RX ADMIN — ATORVASTATIN CALCIUM 80 MG: 40 TABLET, FILM COATED ORAL at 08:04

## 2024-04-22 RX ADMIN — LISINOPRIL 10 MG: 5 TABLET ORAL at 08:04

## 2024-04-22 RX ADMIN — Medication 1 PATCH: at 08:04

## 2024-04-22 NOTE — PROGRESS NOTES
Left VM re: Stroke Mobile Enrollment. Awaiting pts return call back. Will try more phone attempts to reach patient for stroke education

## 2024-04-22 NOTE — PT/OT/SLP EVAL
"Speech Language Pathology Evaluation  Cognitive Communication    Patient Name:  Osiel Kerr   MRN:  [id number]  Admitting Diagnosis: Right-sided lacunar infarction    Recommendations:     Recommendations:                General Recommendations:  Follow-up not indicated  Diet recommendations:  Regular, Thin   Aspiration Precautions: Meds whole 1 at a time and Standard aspiration precautions   General Precautions: Standard, aspiration  Communication strategies:  none    Assessment:     Osiel Kerr is a 64 y.o. male with speech, language and cognitive skills wfl  History:     Past Medical History:   Diagnosis Date    Essential (primary) hypertension     Mixed hyperlipidemia     Paroxysmal A-fib     Right-sided lacunar infarction 04/21/2024       No past surgical history on file.    Social History: Patient lives with spouse.        Prior diet: regular.    Occupation/hobbies/homemaking: retired .      Subjective     'I was a , so I know what to look for" re symptoms  Patient goals: home     Pain/Comfort:  Pain Rating 1: 0/10  Pain Rating Post-Intervention 1: 0/10    Respiratory Status: Room air    Objective:     Cognitive Status:    Pt. was oriented x3 with good recall of recent and remote temporal and general information.  Immediate/delayed verbal recall was wfl with pt. Repeating 7 digits and 5 words without difficulty.    Responses to hypothetical verbal problem solving tasks were accurate and complete.  Pt. Compared and contrasted objects and generated multiple solutions to problems.  Thought organization and categorization skills were wfl with pt. Naming 18 animals given one minute when 15-20 are wnl.  Pt. Responded to functional math and time calculations with 100% accuracy and sequenced 4 words presented auditorily on 2/2 trials.        Receptive Language:   Comprehension:   Pt. Responded to complex yes/no questions and multi step commands with 100% accuracy and no delays in responding.  "       Pragmatics:    wfl    Expressive Language:  Verbal:    Verbal language skills were wfl with no evidence of aphasia.  Pt. Expressed their thoughts coherently in conversation with no evidence of confusion or word finding deficits        Motor Speech:  wfl    Voice:   wfl    Visual-Spatial:  wfl    Reading:   Paragraph wfl      Written Expression:   WFL    Treatment: Education provided to pt. and spouse re role of slp and rehab plan of care     Goals:   Multidisciplinary Problems       SLP Goals       Not on file                    Plan:   Patient to be seen:      Plan of Care expires:     Plan of Care reviewed with:  patient   SLP Follow-Up:  No       Discharge recommendations:  Therapy Intensity Recommendations at Discharge: No Therapy Indicated   Barriers to Discharge:  None    Time Tracking:     SLP Treatment Date:   04/22/24  Speech Start Time:  0810  Speech Stop Time:  0828     Speech Total Time (min):  18 min    Billable Minutes: Eval 10  and Self Care/Home Management Training 8    04/22/2024

## 2024-04-22 NOTE — PLAN OF CARE
[facility]  Discharge Assessment    Primary Care Provider: No, Primary Doctor     Discharge Assessment (most recent)       BRIEF DISCHARGE ASSESSMENT - 04/22/24 1148          Discharge Planning    Resource/Environmental Concerns none (P)      Support Systems Spouse/significant other (P)      Equipment Currently Used at Home none (P)      Current Living Arrangements home (P)      Patient/Family Anticipates Transition to home with family (P)      Patient/Family Anticipated Services at Transition none (P)      DME Needed Upon Discharge  none (P)      Discharge Plan A Home with family (P)         Physical Activity    On average, how many days per week do you engage in moderate to strenuous exercise (like a brisk walk)? 4 days (P)      On average, how many minutes do you engage in exercise at this level? 20 min (P)         Financial Resource Strain    How hard is it for you to pay for the very basics like food, housing, medical care, and heating? Not very hard (P)         Housing Stability    In the last 12 months, was there a time when you were not able to pay the mortgage or rent on time? No (P)      At any time in the past 12 months, were you homeless or living in a shelter (including now)? No (P)         Transportation Needs    In the past 12 months, has lack of transportation kept you from medical appointments or from getting medications? No (P)      In the past 12 months, has lack of transportation kept you from meetings, work, or from getting things needed for daily living? No (P)         Food Insecurity    Within the past 12 months, you worried that your food would run out before you got the money to buy more. Never true (P)      Within the past 12 months, the food you bought just didn't last and you didn't have money to get more. Never true (P)         Stress    Do you feel stress - tense, restless, nervous, or anxious, or unable to sleep at night because your mind is troubled all the  time - these days? Only a little (P)         Social Connections    In a typical week, how many times do you talk on the phone with family, friends, or neighbors? Three times a week (P)      How often do you get together with friends or relatives? Three times a week (P)      How often do you attend Sabianist or Baptism services? 1 to 4 times per year (P)      Do you belong to any clubs or organizations such as Sabianist groups, unions, fraternal or athletic groups, or school groups? No (P)      Are you , , , , never , or living with a partner?  (P)         Alcohol Use    Q1: How often do you have a drink containing alcohol? 2-4 times a month (P)      Q2: How many drinks containing alcohol do you have on a typical day when you are drinking? 1 or 2 (P)      Q3: How often do you have six or more drinks on one occasion? Less than monthly (P)                    Patient resides in a [facility] with his wife Paola.  Patient is a retired  and drives.  He is independent with ADL's and ambulation and has not DME or HH needs.  Patient is not on HD or Coumadin.  Patient will discharge home with family when cleared.  Wife can provide support and transportation.      Discharge Plan A and Plan B have been determined by review of patient's clinical status, future medical and therapeutic needs, and coverage/benefits for post-acute care in coordination with multidisciplinary team members.    Apoorva Wilson, AMY  [facility]  [phone]

## 2024-04-22 NOTE — PLAN OF CARE
Problem: Adjustment to Illness (Stroke, Ischemic/Transient Ischemic Attack)  Goal: Optimal Coping  Outcome: Ongoing, Progressing     Problem: Bowel Elimination Impaired (Stroke, Ischemic/Transient Ischemic Attack)  Goal: Effective Bowel Elimination  Outcome: Ongoing, Progressing     Problem: Cerebral Tissue Perfusion (Stroke, Ischemic/Transient Ischemic Attack)  Goal: Optimal Cerebral Tissue Perfusion  Outcome: Ongoing, Progressing     Problem: Cognitive Impairment (Stroke, Ischemic/Transient Ischemic Attack)  Goal: Optimal Cognitive Function  Outcome: Ongoing, Progressing     Problem: Communication Impairment (Stroke, Ischemic/Transient Ischemic Attack)  Goal: Improved Communication Skills  Outcome: Ongoing, Progressing     Problem: Functional Ability Impaired (Stroke, Ischemic/Transient Ischemic Attack)  Goal: Optimal Functional Ability  Outcome: Ongoing, Progressing

## 2024-04-22 NOTE — PROGRESS NOTES
[facility] ([facility])  Vascular Neurology  Comprehensive Stroke Center  Progress Note    Assessment/Plan:     * Right-sided lacunar infarction  Osiel Kerr is a 64 y.o. male with PMH of HTN, Afib (on eliquis) that presented to the ED with left side facial droop, and slurred speech on 4/21. LKN 4/19, approx >48 hrs PTA. Vascular Neurology consulted for right acute lacunar infarct found on MRI Brain w/o Contrast.     Exam significant for slight facial droop and slurred speech that is detected by wife and daughter (at bedside) when asked to compare patient's current facial movements and articulation to 2 weeks ago. NIHSS 3. MRI Brain w/o Contrast with an acute right lacunar type infarct to the corona radiata. Not candidate for acute interventions, OOW for TNK and no LVO for thrombectomy. Patient will be admitted to vascular neurology service/NPU for further evaluation and stroke workup.    Antithrombotics for secondary stroke prevention: Anticoagulants: Apixaban 5 mg BID     Statins for secondary stroke prevention and hyperlipidemia, if present:   Statins: Atorvastatin- 80 mg daily    Aggressive risk factor modification: HTN, Smoking, HLD, A-Fib     Rehab efforts: The patient has been evaluated by a stroke team provider and the therapy needs have been fully considered based off the presenting complaints and exam findings. The following therapy evaluations are needed: SLP evaluate and treat    Diagnostics ordered/pending: HgbA1C to assess blood glucose levels, TTE to assess cardiac function/status     VTE prophylaxis: Mechanical prophylaxis: Place SCDs  None: Reason for No Pharmacological VTE Prophylaxis: Currently on anticoagulation    BP parameters: Infarct: No intervention, SBP <220        Paroxysmal atrial fibrillation  Stroke risk factor  On rate control and anticoagulation continue home doses    Essential hypertension  Stroke risk factor  SBP <180  Restart home meds    Hyperlipidemia, mixed  Stroke  risk factor    Lipitor 80 mg daily    Dysarthria due to acute stroke  Due to stroke  Aggressive therapy          Tobacco use disorder, severe, dependence  Stroke risk factor   on smoking cessation  Nicotine patch  Gave information on [facility] cessation program and facilities that provide it at [facility]         4-21-24 patient with right lacunar infarct, no thrombolytics or intervention as on Eliquis and no LVO, admit fo complete stroke w/u  4-22-24 no acute issues overnight, cleared for discharge, referral to sleep medicine, vascular neurology and cardiology    STROKE DOCUMENTATION   Acute Stroke Times   Last Known Normal Date: 04/18/24  Unknown Normal Time: Unknown Time    NIH Scale:  1a. Level of Consciousness: 0-->Alert, keenly responsive  1b. LOC Questions: 0-->Answers both questions correctly  1c. LOC Commands: 0-->Performs both tasks correctly  2. Best Gaze: 0-->Normal  3. Visual: 0-->No visual loss  4. Facial Palsy: 0-->Normal symmetrical movements  5a. Motor Arm, Left: 0-->No drift, limb holds 90 (or 45) degrees for full 10 secs  5b. Motor Arm, Right: 0-->No drift, limb holds 90 (or 45) degrees for full 10 secs  6a. Motor Leg, Left: 0-->No drift, leg holds 30 degree position for full 5 secs  6b. Motor Leg, Right: 0-->No drift, leg holds 30 degree position for full 5 secs  7. Limb Ataxia: 0-->Absent  8. Sensory: 0-->Normal, no sensory loss  9. Best Language: 0-->No aphasia, normal  10. Dysarthria: 0-->Normal  11. Extinction and Inattention (formerly Neglect): 0-->No abnormality  Total (NIH Stroke Scale): 0       Modified [place]. Bernard Score: 1  Joaquim Coma Scale:    ABCD2 Score:    [id number] Score:   HAS -BLED Score:   ICH Score:   Hunt & Caceres Classification:      Hemorrhagic change of an Ischemic Stroke: Does this patient have an ischemic stroke with hemorrhagic changes? No     Neurologic Chief Complaint: Right Lacunar infarct    Subjective:     Interval History: Patient is seen for  follow-up neurological assessment and treatment recommendations: no acute issues overnight, cleared for discharge with no therapy needs    HPI, Past Medical, Family, and Social History remains the same as documented in the initial encounter.     Review of Systems   Constitutional:  Negative for chills and fever.   Neurological:  Positive for facial asymmetry and speech difficulty. Negative for weakness.     Scheduled Meds:  Current Facility-Administered Medications   Medication Dose Route Frequency    apixaban  5 mg Oral BID    atorvastatin  80 mg Oral Daily    lisinopriL  10 mg Oral Daily    metoprolol succinate  12.5 mg Oral Daily    nicotine  1 patch Transdermal Daily    polyethylene glycol  17 g Oral Daily    senna-docusate 8.6-50 mg  1 tablet Oral BID     Continuous Infusions:  Current Facility-Administered Medications   Medication Dose Route Frequency Last Rate Last Admin     PRN Meds:  Current Facility-Administered Medications:     bisacodyL, 10 mg, Rectal, Daily PRN    calcium carbonate, 500 mg, Oral, TID PRN    labetalol, 10 mg, Intravenous, Q15 Min PRN    sodium chloride 0.9%, 500 mL, Intravenous, PRN    sodium chloride 0.9%, 10 mL, Intravenous, PRN    Objective:     Vital Signs (Most Recent):  Temp: 98.6 °F (37 °C) (04/22/24 0707)  Pulse: (!) 58 (04/22/24 0707)  Resp: 18 (04/22/24 0707)  BP: 132/74 (04/22/24 0707)  SpO2: 96 % (04/22/24 0707)  BP Location: Right arm    Vital Signs Range (Last 24H):  Temp:  [97.4 °F (36.3 °C)-98.6 °F (37 °C)]   Pulse:  [48-76]   Resp:  [15-21]   BP: (121-153)/(67-96)   SpO2:  [96 %-100 %]   BP Location: Right arm       Physical Exam  Vitals and nursing note reviewed.   Constitutional:       Appearance: Normal appearance. He is normal weight.   Neurological:      Mental Status: He is alert and oriented to person, place, and time.              Neurological Exam:   LOC: alert  Attention Span: Good   Language: No aphasia  Articulation: Dysarthria  Facial Movement (CN VII):  "Lower facial weakness on the Left  Motor: Arm left  Normal 5/5  Leg left  Normal 5/5  Arm right  Normal 5/5  Leg right Normal 5/5  Tone: Normal tone throughout    Laboratory:  CMP:   Recent Labs   Lab 04/22/24  0252   CALCIUM 9.9   ALBUMIN 3.8   PROT 7.1      K 4.9   CO2 24      BUN 19   CREATININE 1.2   ALKPHOS 57   ALT 12   AST 14   BILITOT 0.5     CBC:   Recent Labs   Lab 04/22/24  0252   WBC 8.20   RBC 5.51   HGB 16.3   HCT 48.4      MCV 88   MCH 29.6   MCHC 33.7     Lipid Panel:   Recent Labs   Lab 04/21/24  1221   CHOL 216*   LDLCALC 114.0   HDL 51   TRIG 255*     Coagulation:   Recent Labs   Lab 04/22/24  0252   INR 1.1   APTT 32.6*     Platelet Aggregation Study: No results for input(s): "PLTAGG", "PLTAGINTERP", "PLTAGREGLACO", "ADPPLTAGGREG" in the last 168 hours.  Hgb A1C: No results for input(s): "HGBA1C" in the last 168 hours.  TSH:   Recent Labs   Lab 04/21/24  1221   TSH 0.499       Diagnostic Results     Brain Imaging   MRI Brain w/o contrast 4-21-24 results:    Right corona radiata acute lacunar type infarct.  No intracranial hemorrhage or major vascular distribution infarct.     Changes of mild chronic microvascular ischemic disease and cerebral volume loss with few remote right-sided lacunar type infarcts.    Vessel Imaging   MRA Brain and neck 4-21-24 results:  No significant arterial abnormalities.     Cardiac Imaging   2 D Echo 4-22-24 results:    Left Ventricle: The left ventricle is normal in size. Ventricular mass is normal. Normal wall thickness. Normal wall motion. There is normal systolic function. Ejection fraction by visual approximation is 65%. There is normal diastolic function.    Right Ventricle: Normal right ventricular cavity size. Wall thickness is normal. Right ventricle wall motion  is normal. Systolic function is normal.    Left Atrium: Agitated saline study of the atrial septum is positive, intracardiac shunt at atrial level. There is a very small (<10 " bubbles) atrial shunt.    Mitral Valve: There is mild regurgitation.    Pulmonary Artery: The estimated pulmonary artery systolic pressure is 30 mmHg.    IVC/SVC: Normal venous pressure at 3 mmHg.    Abida Joiner NP  [facility]  Department of Vascular Neurology   [facility])

## 2024-04-22 NOTE — ASSESSMENT & PLAN NOTE
Osiel Kerr is a 64 y.o. male with PMH of HTN, Afib (on eliquis) that presented to the ED with left side facial droop, and slurred speech on 4/21. LKN 4/19, approx >48 hrs PTA. Vascular Neurology consulted for right acute lacunar infarct found on MRI Brain w/o Contrast.     Exam significant for slight facial droop and slurred speech that is detected by wife and daughter (at bedside) when asked to compare patient's current facial movements and articulation to 2 weeks ago. NIHSS 3. MRI Brain w/o Contrast with an acute right lacunar type infarct to the corona radiata. Not candidate for acute interventions, OOW for TNK and no LVO for thrombectomy. Patient will be admitted to vascular neurology service/NPU for further evaluation and stroke workup.    Antithrombotics for secondary stroke prevention: Anticoagulants: Apixaban 5 mg BID     Statins for secondary stroke prevention and hyperlipidemia, if present:   Statins: Atorvastatin- 80 mg daily    Aggressive risk factor modification: HTN, Smoking, HLD, A-Fib     Rehab efforts: The patient has been evaluated by a stroke team provider and the therapy needs have been fully considered based off the presenting complaints and exam findings. The following therapy evaluations are needed: SLP evaluate and treat    Diagnostics ordered/pending: HgbA1C to assess blood glucose levels, TTE to assess cardiac function/status     VTE prophylaxis: Mechanical prophylaxis: Place SCDs  None: Reason for No Pharmacological VTE Prophylaxis: Currently on anticoagulation    BP parameters: Infarct: No intervention, SBP <220

## 2024-04-22 NOTE — CONSULTS
Nutrition-Related Cardiac Education      Time Spent: 10 min    Learners: Pt and Family    Nutrition Education with handouts: Cardiac TLC nutrition therapy    Comments: Wt stable. Denies any significant wt changes. No indicators of malnutrition. Cardiac TLC nutrition therapy provided detailing how to limit salt and fat intake. Emphasized the importance of diet adherence. Pt verbalized understanding. All questions/concerns addressed. No further needs identified. Pt verbalized understanding. Emphasized the importance of diet adherence. Pt with no additional questions. No other needs identified. Left all education material with pt at bedside.      Barriers to Learning: No    Follow up: Yes    Please consult as needed.  Thank you!

## 2024-04-22 NOTE — NURSING
Nurses Note -- 4 Eyes      4/22/2024   1:53 AM      Skin assessed during: Admit      [x] No Altered Skin Integrity Present    []Prevention Measures Documented      [] Yes- Altered Skin Integrity Present or Discovered   [] LDA Added if Not in Epic (Describe Wound)   [] New Altered Skin Integrity was Present on Admit and Documented in LDA   [] Wound Image Taken    Wound Care Consulted? No    Attending Nurse:  Margo Meza RN/Staff Member:   Kanwal

## 2024-04-22 NOTE — NURSING NOTE
Called into room by Wire Mavis varghese. Patient identified by 2 identifiers. Allergies reviewed. 20g to rt AC noted intact and WNL, flushed easily. Bubble study explained to patient, patient verbalized understanding. Bubble performed, with & without Valsalva. Pt tolerated procedure well and voiced no complaints.

## 2024-04-22 NOTE — PLAN OF CARE
[facility]  Discharge Final Note    Primary Care Provider: No, Primary Doctor    Expected Discharge Date: 4/22/2024    Final Discharge Note (most recent)       Final Note - 04/22/24 1435          Final Note    Assessment Type Final Discharge Note (P)      Anticipated Discharge Disposition Home or Self Care (P)         Post-Acute Status    Post-Acute Authorization Other (P)      Other Status No Post-Acute Service Needs (P)                  Patient discharging home with spouse.  No post acute needs.      Apoorva Wilson LMSW  [facility]  [phone]      Important Message from Medicare             Contact Info       No, Primary Doctor   Relationship: PCP - General        Next Steps: Follow up    No, Primary Doctor   Relationship: PCP - General        Next Steps: Follow up    No, Primary Doctor   Relationship: PCP - General        Next Steps: Follow up

## 2024-04-22 NOTE — DISCHARGE SUMMARY
Harlan Kilpatrick - Neurosurgery ([facility])  Vascular Neurology  Comprehensive Stroke Center  Discharge Summary     Summary:     Admit Date: 4/21/2024 11:37 AM    Discharge Date and Time: No discharge date for patient encounter.    Attending Physician: Brian Casarez MD     Discharge Provider: Abida Joiner NP    History of Present Illness: Osiel Kerr is a 64 y.o. male with PMH of HTN, tobacco use, and Afib (on eliquis) that presented to the ED with left side facial droop, and slurred speech on 4/21. LKN 4/19, approx >48 hrs PTA. Vascular Neurology consulted for right acute lacunar infarct found on MRI Brain w/o Contrast.         Hospital Course (synopsis of major diagnoses, care, treatment, and services provided during the course of the hospital stay): 4-21-24 patient with right lacunar infarct, no thrombolytics or intervention as on Eliquis and no LVO, admit fo complete stroke w/u  4-22-24 no acute issues overnight, cleared for discharge, referral to sleep medicine, vascular neurology and cardiology    Goals of Care Treatment Preferences:  Code Status: Full Code      Stroke Etiology: Ischemic Cardioembolic Atrial fibrillation    STROKE DOCUMENTATION   Acute Stroke Times   Last Known Normal Date: 04/18/24  Unknown Normal Time: Unknown Time     NIH Scale:  1a. Level of Consciousness: 0-->Alert, keenly responsive  1b. LOC Questions: 0-->Answers both questions correctly  1c. LOC Commands: 0-->Performs both tasks correctly  2. Best Gaze: 0-->Normal  3. Visual: 0-->No visual loss  4. Facial Palsy: 0-->Normal symmetrical movements  5a. Motor Arm, Left: 0-->No drift, limb holds 90 (or 45) degrees for full 10 secs  5b. Motor Arm, Right: 0-->No drift, limb holds 90 (or 45) degrees for full 10 secs  6a. Motor Leg, Left: 0-->No drift, leg holds 30 degree position for full 5 secs  6b. Motor Leg, Right: 0-->No drift, leg holds 30 degree position for full 5 secs  7. Limb Ataxia: 0-->Absent  8. Sensory: 0-->Normal, no sensory  loss  9. Best Language: 0-->No aphasia, normal  10. Dysarthria: 0-->Normal  11. Extinction and Inattention (formerly Neglect): 0-->No abnormality  Total (NIH Stroke Scale): 0        Modified [place] Score: 1  Joaquim Coma Scale:    ABCD2 Score:    [id number] Score:   HAS -BLED Score:   ICH Score:   Hunt [employer] Acceres Classification:       Assessment/Plan:     Diagnostic Results:      Brain Imaging:   MRI Brain w/o contrast 4-21-24 results:    Right corona radiata acute lacunar type infarct.  No intracranial hemorrhage or major vascular distribution infarct.     Changes of mild chronic microvascular ischemic disease and cerebral volume loss with few remote right-sided lacunar type infarcts.     Vessel Imaging   MRA Brain and neck 4-21-24 results:  No significant arterial abnormalities.      Cardiac Imaging   2 D Echo 4-22-24 results:    Left Ventricle: The left ventricle is normal in size. Ventricular mass is normal. Normal wall thickness. Normal wall motion. There is normal systolic function. Ejection fraction by visual approximation is 65%. There is normal diastolic function.    Right Ventricle: Normal right ventricular cavity size. Wall thickness is normal. Right ventricle wall motion  is normal. Systolic function is normal.    Left Atrium: Agitated saline study of the atrial septum is positive, intracardiac shunt at atrial level. There is a very small (<10 bubbles) atrial shunt.    Mitral Valve: There is mild regurgitation.    Pulmonary Artery: The estimated pulmonary artery systolic pressure is 30 mmHg.    IVC/SVC: Normal venous pressure at 3 mmHg.    Interventions: None    Complications: None    Disposition: Home or Self Care    Final Active Diagnoses:    Diagnosis Date Noted POA    PRINCIPAL PROBLEM:  Right-sided lacunar infarction [I63.81] 04/21/2024 Yes    Paroxysmal atrial fibrillation [I48.0] 04/21/2023 Yes    Essential hypertension [I10] 02/06/2023 Yes    Hyperlipidemia, mixed [E78.2] 04/22/2024 Yes     Dysarthria due to acute stroke [I63.9, R47.1] 04/22/2024 Yes    Tobacco use disorder, severe, dependence [F17.200] 12/07/2020 Yes    Chronic anticoagulation [Z79.01] 04/22/2024 Not Applicable      Problems Resolved During this Admission:     No new Assessment & Plan notes have been filed under this hospital service since the last note was generated.  Service: Vascular Neurology      Recommendations:     Post-discharge complication risks: None    Stroke Education given to: patient and family    Follow-up in Stroke Clinic in 4-6 weeks.     Discharge Plan:  Statin: Atorvastatin 80mg  Anticoagulant: Apixaban 5mg  Smoking Cessation  Aggresive risk factor modification:  Hypertension  Smoking  High Cholesterol  Atrial Fibrillation    Follow Up:   Follow-up Information       No, Primary Doctor Follow up.                             Patient Instructions:      Ambulatory referral/consult to Vascular Neurology   Standing Status: Future   Referral Priority: Routine Referral Type: Consultation   Referral Reason: Specialty Services Required   Requested Specialty: Vascular Neurology   Number of Visits Requested: 1     Ambulatory referral/consult to Cardiology   Standing Status: Future   Referral Priority: Routine Referral Type: Consultation   Referral Reason: Specialty Services Required   Requested Specialty: Cardiology   Number of Visits Requested: 1     Ambulatory referral/consult to Sleep Disorders   Standing Status: Future   Referral Priority: Routine Referral Type: Consultation   Requested Specialty: Sleep Medicine   Number of Visits Requested: 1     Diet Cardiac   Order Comments: See Stroke Patient Education Guide Booklet for details.     Call 911 for any of the following:   Order Comments: Call 911  right away if any of the following warning signs come on suddenly, even if the symptoms only last for a few minutes. With stroke, timing is very important.   - Warning Signs of Stroke:  - Weakness: You may feel a sudden  weakness, tingling or loss of feeling on one side of your face or body.  - Vision Problems: You may have sudden double vision or trouble seeing in one or both eyes.  - Speech Problems: You may have sudden trouble talking, slured speech, or problems understanding others.  - Headache: You may have sudden, severe headache.  - Movement Problems: You may experience dizziness, a feeling of spinning, a loss of balance, a feeling of falling or blackouts.     Activity as tolerated       Medications:  Reconciled Home Medications:      Medication List        START taking these medications      atorvastatin 80 MG tablet  Commonly known as: LIPITOR  Take 1 tablet (80 mg total) by mouth once daily.  Start taking on: April 23, 2024            CONTINUE taking these medications      ELIQUIS 5 mg Tab  Generic drug: apixaban  Take 5 mg by mouth 2 (two) times daily.     lisinopriL 10 MG tablet  Take 10 mg by mouth.     metoprolol succinate 25 MG 24 hr tablet  Commonly known as: TOPROL-XL  Take 12.5 mg by mouth.            STOP taking these medications      varenicline 0.5 mg (11)- 1 mg (42) tablet  Commonly known as: CHANTIX EFRAIN              Abida Joiner NP  Comprehensive Stroke Center  Department of Vascular Neurology   [facility])

## 2024-04-22 NOTE — SUBJECTIVE & OBJECTIVE
Neurologic Chief Complaint: Right Lacunar infarct    Subjective:     Interval History: Patient is seen for follow-up neurological assessment and treatment recommendations: no acute issues overnight, cleared for discharge with no therapy needs    HPI, Past Medical, Family, and Social History remains the same as documented in the initial encounter.     Review of Systems   Constitutional:  Negative for chills and fever.   Neurological:  Positive for facial asymmetry and speech difficulty. Negative for weakness.     Scheduled Meds:  Current Facility-Administered Medications   Medication Dose Route Frequency    apixaban  5 mg Oral BID    atorvastatin  80 mg Oral Daily    lisinopriL  10 mg Oral Daily    metoprolol succinate  12.5 mg Oral Daily    nicotine  1 patch Transdermal Daily    polyethylene glycol  17 g Oral Daily    senna-docusate 8.6-50 mg  1 tablet Oral BID     Continuous Infusions:  Current Facility-Administered Medications   Medication Dose Route Frequency Last Rate Last Admin     PRN Meds:  Current Facility-Administered Medications:     bisacodyL, 10 mg, Rectal, Daily PRN    calcium carbonate, 500 mg, Oral, TID PRN    labetalol, 10 mg, Intravenous, Q15 Min PRN    sodium chloride 0.9%, 500 mL, Intravenous, PRN    sodium chloride 0.9%, 10 mL, Intravenous, PRN    Objective:     Vital Signs (Most Recent):  Temp: 98.6 °F (37 °C) (04/22/24 0707)  Pulse: (!) 58 (04/22/24 0707)  Resp: 18 (04/22/24 0707)  BP: 132/74 (04/22/24 0707)  SpO2: 96 % (04/22/24 0707)  BP Location: Right arm    Vital Signs Range (Last 24H):  Temp:  [97.4 °F (36.3 °C)-98.6 °F (37 °C)]   Pulse:  [48-76]   Resp:  [15-21]   BP: (121-153)/(67-96)   SpO2:  [96 %-100 %]   BP Location: Right arm       Physical Exam  Vitals and nursing note reviewed.   Constitutional:       Appearance: Normal appearance. He is normal weight.   Neurological:      Mental Status: He is alert and oriented to person, place, and time.              Neurological Exam:   LOC:  "alert  Attention Span: Good   Language: No aphasia  Articulation: Dysarthria  Facial Movement (CN VII): Lower facial weakness on the Left  Motor: Arm left  Normal 5/5  Leg left  Normal 5/5  Arm right  Normal 5/5  Leg right Normal 5/5  Tone: Normal tone throughout    Laboratory:  CMP:   Recent Labs   Lab 04/22/24  0252   CALCIUM 9.9   ALBUMIN 3.8   PROT 7.1      K 4.9   CO2 24      BUN 19   CREATININE 1.2   ALKPHOS 57   ALT 12   AST 14   BILITOT 0.5     CBC:   Recent Labs   Lab 04/22/24  0252   WBC 8.20   RBC 5.51   HGB 16.3   HCT 48.4      MCV 88   MCH 29.6   MCHC 33.7     Lipid Panel:   Recent Labs   Lab 04/21/24  1221   CHOL 216*   LDLCALC 114.0   HDL 51   TRIG 255*     Coagulation:   Recent Labs   Lab 04/22/24  0252   INR 1.1   APTT 32.6*     Platelet Aggregation Study: No results for input(s): "PLTAGG", "PLTAGINTERP", "PLTAGREGLACO", "ADPPLTAGGREG" in the last 168 hours.  Hgb A1C: No results for input(s): "HGBA1C" in the last 168 hours.  TSH:   Recent Labs   Lab 04/21/24  1221   TSH 0.499       Diagnostic Results     Brain Imaging   MRI Brain w/o contrast 4-21-24 results:    Right corona radiata acute lacunar type infarct.  No intracranial hemorrhage or major vascular distribution infarct.     Changes of mild chronic microvascular ischemic disease and cerebral volume loss with few remote right-sided lacunar type infarcts.    Vessel Imaging   MRA Brain and neck 4-21-24 results:  No significant arterial abnormalities.     Cardiac Imaging   2 D Echo 4-22-24 results:    Left Ventricle: The left ventricle is normal in size. Ventricular mass is normal. Normal wall thickness. Normal wall motion. There is normal systolic function. Ejection fraction by visual approximation is 65%. There is normal diastolic function.    Right Ventricle: Normal right ventricular cavity size. Wall thickness is normal. Right ventricle wall motion  is normal. Systolic function is normal.    Left Atrium: Agitated saline " study of the atrial septum is positive, intracardiac shunt at atrial level. There is a very small (<10 bubbles) atrial shunt.    Mitral Valve: There is mild regurgitation.    Pulmonary Artery: The estimated pulmonary artery systolic pressure is 30 mmHg.    IVC/SVC: Normal venous pressure at 3 mmHg.

## 2024-04-22 NOTE — HOSPITAL COURSE
4-21-24 patient with right lacunar infarct, no thrombolytics or intervention as on Eliquis and no LVO, admit fo complete stroke w/u  4-22-24 no acute issues overnight, cleared for discharge, referral to sleep medicine, vascular neurology and cardiology

## 2024-04-24 ENCOUNTER — PATIENT OUTREACH (OUTPATIENT)
Dept: ADMINISTRATIVE | Facility: CLINIC | Age: 64
End: 2024-04-24
Payer: MEDICARE

## 2024-04-24 NOTE — PROGRESS NOTES
C3 nurse attempted to contact patient. The following occurred:   C3 nurse attempted to contact Osiel Kerr for a TCC post hospital discharge follow up call. The patient is unable to conduct the call @ this time. The patient requested a callback.    The patient has a scheduled HOSFU appointment with Sienna Perry MD (PCP) on 4/25/24 @ 11:00am.

## 2024-04-25 ENCOUNTER — TELEPHONE (OUTPATIENT)
Dept: ADMINISTRATIVE | Facility: CLINIC | Age: 64
End: 2024-04-25
Payer: MEDICARE

## 2024-04-25 ENCOUNTER — OFFICE VISIT (OUTPATIENT)
Dept: INTERNAL MEDICINE | Facility: CLINIC | Age: 64
End: 2024-04-25
Payer: MEDICARE

## 2024-04-25 ENCOUNTER — LAB VISIT (OUTPATIENT)
Dept: LAB | Facility: HOSPITAL | Age: 64
End: 2024-04-25
Attending: INTERNAL MEDICINE
Payer: MEDICARE

## 2024-04-25 VITALS
BODY MASS INDEX: 23.36 KG/M2 | WEIGHT: 154.13 LBS | HEART RATE: 64 BPM | OXYGEN SATURATION: 99 % | SYSTOLIC BLOOD PRESSURE: 116 MMHG | HEIGHT: 68 IN | DIASTOLIC BLOOD PRESSURE: 76 MMHG

## 2024-04-25 DIAGNOSIS — K64.9 BLEEDING HEMORRHOID: ICD-10-CM

## 2024-04-25 DIAGNOSIS — I63.81 RIGHT-SIDED LACUNAR INFARCTION: ICD-10-CM

## 2024-04-25 DIAGNOSIS — G60.9 HEREDITARY AND IDIOPATHIC NEUROPATHY, UNSPECIFIED: ICD-10-CM

## 2024-04-25 DIAGNOSIS — R73.03 PREDIABETES: ICD-10-CM

## 2024-04-25 DIAGNOSIS — I48.0 PAROXYSMAL ATRIAL FIBRILLATION: Primary | ICD-10-CM

## 2024-04-25 DIAGNOSIS — R20.2 PARESTHESIA OF SKIN: ICD-10-CM

## 2024-04-25 DIAGNOSIS — G62.89 OTHER POLYNEUROPATHY: ICD-10-CM

## 2024-04-25 DIAGNOSIS — Q24.8 INTERATRIAL CARDIAC SHUNT: ICD-10-CM

## 2024-04-25 DIAGNOSIS — F17.200 TOBACCO USE DISORDER, SEVERE, DEPENDENCE: ICD-10-CM

## 2024-04-25 LAB
FOLATE SERPL-MCNC: 7.7 NG/ML (ref 4–24)
VIT B12 SERPL-MCNC: 334 PG/ML (ref 210–950)

## 2024-04-25 PROCEDURE — 1111F DSCHRG MED/CURRENT MED MERGE: CPT | Mod: CPTII,S$GLB,, | Performed by: INTERNAL MEDICINE

## 2024-04-25 PROCEDURE — 1101F PT FALLS ASSESS-DOCD LE1/YR: CPT | Mod: CPTII,S$GLB,, | Performed by: INTERNAL MEDICINE

## 2024-04-25 PROCEDURE — 99999 PR PBB SHADOW E&M-EST. PATIENT-LVL IV: CPT | Mod: PBBFAC,,, | Performed by: INTERNAL MEDICINE

## 2024-04-25 PROCEDURE — 82746 ASSAY OF FOLIC ACID SERUM: CPT | Performed by: INTERNAL MEDICINE

## 2024-04-25 PROCEDURE — 4010F ACE/ARB THERAPY RXD/TAKEN: CPT | Mod: CPTII,S$GLB,, | Performed by: INTERNAL MEDICINE

## 2024-04-25 PROCEDURE — 84207 ASSAY OF VITAMIN B-6: CPT | Performed by: INTERNAL MEDICINE

## 2024-04-25 PROCEDURE — 1160F RVW MEDS BY RX/DR IN RCRD: CPT | Mod: CPTII,S$GLB,, | Performed by: INTERNAL MEDICINE

## 2024-04-25 PROCEDURE — 3288F FALL RISK ASSESSMENT DOCD: CPT | Mod: CPTII,S$GLB,, | Performed by: INTERNAL MEDICINE

## 2024-04-25 PROCEDURE — 82607 VITAMIN B-12: CPT | Performed by: INTERNAL MEDICINE

## 2024-04-25 PROCEDURE — 1159F MED LIST DOCD IN RCRD: CPT | Mod: CPTII,S$GLB,, | Performed by: INTERNAL MEDICINE

## 2024-04-25 PROCEDURE — 99496 TRANSJ CARE MGMT HIGH F2F 7D: CPT | Mod: S$GLB,,, | Performed by: INTERNAL MEDICINE

## 2024-04-25 PROCEDURE — 3078F DIAST BP <80 MM HG: CPT | Mod: CPTII,S$GLB,, | Performed by: INTERNAL MEDICINE

## 2024-04-25 PROCEDURE — 1126F AMNT PAIN NOTED NONE PRSNT: CPT | Mod: CPTII,S$GLB,, | Performed by: INTERNAL MEDICINE

## 2024-04-25 PROCEDURE — 3074F SYST BP LT 130 MM HG: CPT | Mod: CPTII,S$GLB,, | Performed by: INTERNAL MEDICINE

## 2024-04-25 PROCEDURE — 3044F HG A1C LEVEL LT 7.0%: CPT | Mod: CPTII,S$GLB,, | Performed by: INTERNAL MEDICINE

## 2024-04-25 PROCEDURE — 84425 ASSAY OF VITAMIN B-1: CPT | Performed by: INTERNAL MEDICINE

## 2024-04-25 NOTE — PROGRESS NOTES
C3 nurse spoke with Osiel Kerr for a TCC post hospital discharge follow up call. The patient had a scheduled HOSFU appointment with Sienna Perry MD (PCP) on 4/25/24, and this was completed.

## 2024-04-25 NOTE — PROGRESS NOTES
Transitional Care Note    Family and/or Caretaker present at visit?  Yes.  Diagnostic tests reviewed/disposition: No diagnosic tests pending after this hospitalization.  Disease/illness education: completed   Home health/community services discussion/referrals: Patient does not have home health established from hospital visit.  They do not need home health.  If needed, we will set up home health for the patient.   Establishment or re-establishment of referral orders for community resources:  refer to smoking cessation .   Discussion with other health care providers: No discussion with other health care providers necessary.         Subjective:       Patient ID: Osiel Kerr is a 64 y.o. male.    Chief Complaint: Hospital Follow Up (Stroke )     Osiel Kerr is a 64 y.o.  male who presents for Hospital Follow Up (Stroke )  .  Recent lacunar infarct.  Hx of a fib.  And awoke with left sided facial drop, present to hospital after his daughter noticed his symptoms.   Not candidate for TPA, medically maximized with iadded atorvastatin 80 mg nightly.  Cont on lisonpril and 12.5 mg toprol XL   Attempting to quit smoking.       No palipiations, no new weakness.    Does drink one cup of coffee, no etoh.    Reports remote syncope     ####Atrial Fibrillation  Dx 4/23 started on toprol XL, eloquis and given rx for amiodorone, pt reports he did not take this, was planned for caridoversion but this was canceled when he was found to be in NSR on his return.    ### Tobacco abuse  Smoking since age 16, roughly 1 ppd, decreased during 2023    Recent colonoscopy irritated his hemorrhoids, reports intermittent bright red blood per rectum after stooling. No current bleeding.          Patient Active Problem List   Diagnosis    Essential hypertension    Palpitations    Paroxysmal atrial fibrillation    Tobacco use    Tobacco use disorder, severe, dependence    Right-sided lacunar infarction    Hyperlipidemia, mixed    Dysarthria due to acute  "stroke    Chronic anticoagulation           Past Medical History:   Diagnosis Date    Essential (primary) hypertension     Mixed hyperlipidemia     Paroxysmal A-fib     Right-sided lacunar infarction 04/21/2024       No past surgical history on file.    No family history on file.    Social History     Tobacco Use    Smoking status: Former     Types: Cigarettes    Smokeless tobacco: Current         Review of Systems   Constitutional:  Negative for chills and fever.   Respiratory:  Negative for cough and shortness of breath.    Cardiovascular:  Negative for chest pain and palpitations.   Gastrointestinal:  Negative for nausea and vomiting.   Genitourinary:  Negative for dysuria and hematuria.   Musculoskeletal:         Pins and needles in bottom of feet at night  Denies claudication          Objective:   Blood pressure 116/76, pulse 64, height 5' 8" (1.727 m), weight 69.9 kg (154 lb 1.6 oz), SpO2 99%.     Physical Exam  Constitutional:       Appearance: Normal appearance. He is well-developed. He is not ill-appearing.   HENT:      Head: Normocephalic and atraumatic.   Eyes:      General: No scleral icterus.     Conjunctiva/sclera: Conjunctivae normal.   Cardiovascular:      Rate and Rhythm: Normal rate.      Heart sounds: Normal heart sounds. No murmur heard.     No friction rub. No gallop.   Pulmonary:      Effort: Pulmonary effort is normal.      Breath sounds: Normal breath sounds. No wheezing or rales.   Chest:      Chest wall: No tenderness.   Abdominal:      General: Bowel sounds are normal.      Palpations: Abdomen is soft.   Musculoskeletal:         General: No tenderness or signs of injury.   Skin:     General: Skin is warm and dry.   Neurological:      Mental Status: He is alert and oriented to person, place, and time. Mental status is at baseline.      Comments: Slight weakness on left lower face noted   Psychiatric:         Behavior: Behavior normal.         Thought Content: Thought content normal.       "   Prior labs reviewed    Health Maintenance         Date Due Completion Date    Pneumococcal Vaccines (Age 65+) (1 of 2 - PCV) Never done ---    TETANUS VACCINE Never done ---    Colorectal Cancer Screening Never done ---    Shingles Vaccine (1 of 2) Never done ---    RSV Vaccine (Age 60+ and Pregnant patients) (1 - 1-dose 60+ series) Never done ---    Abdominal Aortic Aneurysm Screening Never done ---    Influenza Vaccine (1) Never done ---    COVID-19 Vaccine (3 - 2023-24 season) 09/01/2023 5/3/2021    High Dose Statin 04/25/2025 4/25/2024    Lipid Panel 04/21/2029 4/21/2024            Assessment/Plan:       1. Paroxysmal atrial fibrillation  -     Ambulatory referral/consult to Cardiac Electrophysiology; Future; Expected date: 05/02/2024  -     Ambulatory referral/consult to Neurology; Future; Expected date: 05/25/2024    2. Tobacco use disorder, severe, dependence  -     Ambulatory referral/consult to Smoking Cessation Program; Future; Expected date: 05/02/2024    3. Interatrial cardiac shunt    4. Bleeding hemorrhoid  Comments:  currently resolved, recommend increasing fiber, fluids  consider referral for banding if needed  Orders:  -     CBC Auto Differential; Future; Expected date: 07/23/2024    5. Right-sided lacunar infarction  -     Ambulatory referral/consult to Neurology; Future; Expected date: 05/25/2024  -     Comprehensive Metabolic Panel; Future; Expected date: 07/26/2024  -     Lipid Panel; Future; Expected date: 07/26/2024    6. Prediabetes  -     Hemoglobin A1C; Future; Expected date: 07/24/2024    7. Other polyneuropathy  -     Vitamin B12; Future; Expected date: 04/25/2024  -     VITAMIN B6; Future; Expected date: 04/25/2024  -     Folate; Future; Expected date: 04/25/2024  -     Vitamin B1; Future; Expected date: 04/25/2024    8. Paresthesia of skin  -     Vitamin B12; Future; Expected date: 04/25/2024  -     Folate; Future; Expected date: 04/25/2024    9. Hereditary and idiopathic neuropathy,  unspecified  -     VITAMIN B6; Future; Expected date: 04/25/2024        RTC in 3 mo- to establish care  Labs prior to rtc  Medication List with Changes/Refills   Current Medications    ATORVASTATIN (LIPITOR) 80 MG TABLET    Take 1 tablet (80 mg total) by mouth once daily.    ELIQUIS 5 MG TAB    Take 5 mg by mouth 2 (two) times daily.    LISINOPRIL 10 MG TABLET    Take 10 mg by mouth.    METOPROLOL SUCCINATE (TOPROL-XL) 25 MG 24 HR TABLET    Take 12.5 mg by mouth.       Recommend patient complete vaccinations as listed in the overdue health maintenance report. Pt may obtain vaccinations at their local pharmacy, any [facility] pharmacy or request vaccination in our clinic.  Please note that some insurances will only cover vaccination cost at specific locations.Please check with your insurance provider regarding your coverage.  Vaccines that are not covered are still recommended.

## 2024-04-25 NOTE — PROGRESS NOTES
"Phoned patient in response to reply of "3" to post-discharge texting tracker. Mr. Kerr states he replied in error. He said he thought his reply was that he did not have any questions or concerns at this time.    "

## 2024-04-26 ENCOUNTER — PATIENT MESSAGE (OUTPATIENT)
Dept: NEUROLOGY | Facility: HOSPITAL | Age: 64
End: 2024-04-26
Payer: MEDICARE

## 2024-04-29 ENCOUNTER — CLINICAL SUPPORT (OUTPATIENT)
Dept: SMOKING CESSATION | Facility: CLINIC | Age: 64
End: 2024-04-29
Payer: COMMERCIAL

## 2024-04-29 DIAGNOSIS — F17.200 NICOTINE DEPENDENCE: Primary | ICD-10-CM

## 2024-04-29 DIAGNOSIS — F17.200 TOBACCO USE DISORDER, SEVERE, DEPENDENCE: ICD-10-CM

## 2024-04-29 PROCEDURE — 99999 PR PBB SHADOW E&M-EST. PATIENT-LVL III: CPT | Mod: PBBFAC,,, | Performed by: DIETITIAN, REGISTERED

## 2024-04-29 PROCEDURE — 99404 PREV MED CNSL INDIV APPRX 60: CPT | Mod: S$GLB,,, | Performed by: DIETITIAN, REGISTERED

## 2024-04-29 RX ORDER — VARENICLINE TARTRATE 1 MG/1
TABLET, FILM COATED ORAL
Qty: 56 TABLET | Refills: 0 | Status: SHIPPED | OUTPATIENT
Start: 2024-04-29 | End: 2024-05-30

## 2024-04-29 NOTE — PROGRESS NOTES
Patient will be participating in biweekly tobacco cessation meetings and will begin the prescribed tobacco cessation medication regimen of  1 mg varenicline BID .  Patient currently smokes 4 cigarettes per day since about 1 month and vapes JUUL (5% nicotine) daily .  Pt's exhaled carbon monoxide level was 5 ppm as per Smokerlyzer. (non- smoker = 0-5 ppm.)   Pt had a stroke 2 weeks ago and feels that now is the best time for him to quit tobacco and nicotine altogether. Discussed the role of tobacco cessation program, role of tobacco cessation pharmacotherapy, and behavioral changes to assist the patient to reach his goal of being tobacco free.   Education and instruction on varenicline use provided,  including proper usage and titration schedule. Patient verbalized understanding and willingness to use as directed. Patient understands he can call CTTS at any time.

## 2024-04-30 LAB
PYRIDOXAL SERPL-MCNC: 4 UG/L (ref 5–50)
VIT B1 BLD-MCNC: 82 UG/L (ref 38–122)

## 2024-05-01 ENCOUNTER — PATIENT MESSAGE (OUTPATIENT)
Dept: INTERNAL MEDICINE | Facility: CLINIC | Age: 64
End: 2024-05-01
Payer: MEDICARE

## 2024-05-01 DIAGNOSIS — G63 VITAMIN B6 DEFICIENCY NEUROPATHY: ICD-10-CM

## 2024-05-01 DIAGNOSIS — E53.1 VITAMIN B6 DEFICIENCY NEUROPATHY: ICD-10-CM

## 2024-05-01 DIAGNOSIS — E53.8 B12 DEFICIENCY: Primary | ICD-10-CM

## 2024-05-01 RX ORDER — PYRIDOXINE HCL (VITAMIN B6) 250 MG
250 TABLET ORAL DAILY
Qty: 90 TABLET | Refills: 1 | Status: SHIPPED | OUTPATIENT
Start: 2024-05-01 | End: 2024-06-05

## 2024-05-01 RX ORDER — CHOLECALCIFEROL (VITAMIN D3) 25 MCG
1 TABLET,CHEWABLE ORAL DAILY
Qty: 90 LOZENGE | Refills: 3 | Status: SHIPPED | OUTPATIENT
Start: 2024-05-01 | End: 2024-06-05

## 2024-05-01 NOTE — TELEPHONE ENCOUNTER
Please inform pt his vit B6 and his vit B12 levels are low which may be causing the symptoms in his feet.  Please ask him to start taking a sublingual- under the tongue B12 tablet (at least 1000 mg) daily as well as a B6 (pyroxidine) 400 mg daily.  I sent message to pharmacy but these are not prescription and will not be covered by his insurance. Can take similar otc supplements.

## 2024-05-02 NOTE — TELEPHONE ENCOUNTER
Called pt and spoke on the phone -- relayed info from Dr. MOLINA to pt and that she would like for him to start taking vitamin b supplements -- pt verbal understanding, and sent pt details about supplements in portal

## 2024-05-10 ENCOUNTER — TELEPHONE (OUTPATIENT)
Dept: INTERNAL MEDICINE | Facility: CLINIC | Age: 64
End: 2024-05-10
Payer: MEDICARE

## 2024-05-10 NOTE — TELEPHONE ENCOUNTER
Not clear if the pyridoxine is related to the diarrhea but I recommend he stop the pyridoxine. Ok to continue the atorvastatin as this is not likely to be related to his symptoms.   Recommend clear liquids, gentle diet avoiding spicy foods and dairy until symptoms improve.

## 2024-05-10 NOTE — TELEPHONE ENCOUNTER
----- Message from Shahnaz Ramires sent at 5/10/2024  8:26 AM CDT -----  Regarding: medication  Name of caller: nereida       What is the requesting detail: pt believes is havng some negative side affects from his pyridoxine, vitamin B6, (B-6) 250 MG Tab and atorvastatin (LIPITOR) 80 MG tablet. Please give him a call back to further discuss.       Can the clinic reply by [facility]:       What number to call back:[phone]

## 2024-05-10 NOTE — TELEPHONE ENCOUNTER
Called and spoke to pt on the phone -- relayed info from [place] regarding stopping Vit B Rx and continuing the statin -- as well as trying to avoid spicy food and dairy -- told pt to see how this weekend goes and how his symptoms progress and to keep us updated

## 2024-05-10 NOTE — TELEPHONE ENCOUNTER
Called and spoke to pt on the phone -- pt states that he feels like his cholesterol Rx (atorvastatin 80mg) is causing him to have negative side effects -- pt reports that he often feels nausea and fatigue after taking and has been having diarrhea for the last 3 days or so -- pt isn't absolutely sure if it is the atorvastatin meds or a different one or a combination but he would like to see what he can do to stop his symptoms

## 2024-05-13 RX ORDER — METOPROLOL SUCCINATE 25 MG/1
12.5 TABLET, EXTENDED RELEASE ORAL DAILY
Qty: 90 TABLET | Refills: 1 | Status: SHIPPED | OUTPATIENT
Start: 2024-05-13

## 2024-05-13 RX ORDER — LISINOPRIL 10 MG/1
10 TABLET ORAL DAILY
Qty: 90 TABLET | Refills: 3 | Status: SHIPPED | OUTPATIENT
Start: 2024-05-13

## 2024-05-13 RX ORDER — APIXABAN 5 MG/1
5 TABLET, FILM COATED ORAL 2 TIMES DAILY
Qty: 90 TABLET | Refills: 3 | Status: SHIPPED | OUTPATIENT
Start: 2024-05-13

## 2024-05-13 NOTE — TELEPHONE ENCOUNTER
----- Message from Lelia Davalos sent at 5/13/2024 12:43 PM CDT -----  Regarding: refill  Type: RX Refill Request     Who Called: pt      RX Name and Strength: lisinopriL tablet 10 mg, metoprolol succinate (TOPROL-XL) 24 hr split tablet 25 mg ,   ELIQUIS 5 mg Tab       Preferred Pharmacy with phone number:[facility]/pharmacy #[id number] [address] [facility]   Phone: [phone]       Would the patient rather a call back or a response via [facility]?call     Best Call Back Number:[phone]     Additional Information: pt has had a f/u with provider in the past and is attempting to transition care to [facility] from [facility]; states his rx have been cancelled by old provider and needs new rx submitted

## 2024-05-16 ENCOUNTER — CLINICAL SUPPORT (OUTPATIENT)
Dept: SMOKING CESSATION | Facility: CLINIC | Age: 64
End: 2024-05-16
Payer: COMMERCIAL

## 2024-05-16 DIAGNOSIS — F17.200 NICOTINE DEPENDENCE: Primary | ICD-10-CM

## 2024-05-16 PROCEDURE — 99999 PR PBB SHADOW E&M-EST. PATIENT-LVL II: CPT | Mod: PBBFAC,,, | Performed by: DIETITIAN, REGISTERED

## 2024-05-16 PROCEDURE — 99404 PREV MED CNSL INDIV APPRX 60: CPT | Mod: S$GLB,,, | Performed by: DIETITIAN, REGISTERED

## 2024-05-16 RX ORDER — IBUPROFEN 200 MG
1 TABLET ORAL DAILY
Qty: 14 PATCH | Refills: 0 | Status: SHIPPED | OUTPATIENT
Start: 2024-05-16 | End: 2024-05-30 | Stop reason: SDUPTHER

## 2024-05-16 NOTE — Clinical Note
Patient was seen in clinic today for follow up.  Patient states that he stopped smoking cigarettes since May 03, 2024.  Commended patient on his accomplishment thus far.  Patient also states that he still vapes ( JUUL) 5% 2 hits per day.  Patient states that he put Chantix and other medications on hold due to diarrhea.  Suggested patient to consult with his PCP.  Patient reports bought 21 mg nicotine patches and started since May 09, 2024.  Patient reports vivid dreams.  Informed patient to take the patch of 2 hours before bedtime.  Patient will begin the prescribed tobacco cessation medication regimen of  21 mg nicotine patch QD.  We reviewed the Singh and Tobacco session. Discussed changing habitual behavior  and using 15 minutes delay to break routine tobacco use. Encouraged patient to move his cigarettes so they are out of site.  Patient will continue bi weekly sessions.

## 2024-05-22 ENCOUNTER — TELEPHONE (OUTPATIENT)
Dept: INTERNAL MEDICINE | Facility: CLINIC | Age: 64
End: 2024-05-22
Payer: MEDICARE

## 2024-05-22 DIAGNOSIS — I48.0 PAROXYSMAL ATRIAL FIBRILLATION: Primary | ICD-10-CM

## 2024-05-22 NOTE — TELEPHONE ENCOUNTER
Called pt and spoke on the phone -- pt states that over the last several days, his heart rate has been spiking and there is no pattern, aka can be doing nothing/sitting on the couch and pulse jumps to 192 -- pt also states that he feels like someone is standing on his chest at these times -- pt is wanting to see if he can get a heart monitor before he goes in to see the heart doctor -- order pending

## 2024-05-22 NOTE — TELEPHONE ENCOUNTER
----- Message from Linda Singleton MA sent at 5/22/2024  8:28 AM CDT -----  Name of Who is Calling:CHRIS DHALIWAL [[id number]]                What is the request in detail: Pt is requesting a call back to discuss getting a heart monitor before he sees a heart dr. Please assist.                Can the clinic reply by [facility]: No                What Number to Call Back [facility]: [phone]

## 2024-05-23 ENCOUNTER — TELEPHONE (OUTPATIENT)
Dept: INTERNAL MEDICINE | Facility: CLINIC | Age: 64
End: 2024-05-23
Payer: MEDICARE

## 2024-05-23 NOTE — TELEPHONE ENCOUNTER
----- Message from Ghada Hancock sent at 5/23/2024  9:55 AM CDT -----  Name of Who is Calling: CHRIS DHALIWAL [[id number]]              What is the request in detail: Patient requesting a call back to discuss heart rhythm              Can the clinic reply by [facility]: No              What Number to Call Back if not in [facility]:  [phone]

## 2024-05-23 NOTE — TELEPHONE ENCOUNTER
Called and spoke to pt on the phone -- pt stated that he has been checking his ECG on his watch every 10 min and it is still high -- told pt to maybe take it less and try not to think about it as much as anxiety can maintain the high level -- booked pt in for holter monitor 5/27

## 2024-05-23 NOTE — TELEPHONE ENCOUNTER
Please make sure pt is taking the metoprolol XL daily as directed.  Please ask him to increase it to a whole pill IF his baseline HR is above 65, otherwise stay on half dose.  Avoid stimuants, alcohol.   If he is having chest pain/pressure during these episodes he needs to go to ED for immediate evaluation and management of HR

## 2024-05-27 ENCOUNTER — HOSPITAL ENCOUNTER (OUTPATIENT)
Dept: CARDIOLOGY | Facility: HOSPITAL | Age: 64
Discharge: HOME | End: 2024-05-27
Attending: PHYSICIAN ASSISTANT
Payer: MEDICARE

## 2024-05-27 DIAGNOSIS — I48.0 PAROXYSMAL ATRIAL FIBRILLATION: ICD-10-CM

## 2024-05-27 PROCEDURE — 93225 XTRNL ECG REC<48 HRS REC: CPT | Mod: PO

## 2024-05-27 PROCEDURE — 93227 XTRNL ECG REC<48 HR R&I: CPT | Mod: ,,, | Performed by: INTERNAL MEDICINE

## 2024-05-30 ENCOUNTER — CLINICAL SUPPORT (OUTPATIENT)
Dept: SMOKING CESSATION | Facility: CLINIC | Age: 64
End: 2024-05-30
Payer: COMMERCIAL

## 2024-05-30 DIAGNOSIS — F17.200 NICOTINE DEPENDENCE: Primary | ICD-10-CM

## 2024-05-30 PROCEDURE — 99404 PREV MED CNSL INDIV APPRX 60: CPT | Mod: S$GLB,,, | Performed by: DIETITIAN, REGISTERED

## 2024-05-30 PROCEDURE — 99999 PR PBB SHADOW E&M-EST. PATIENT-LVL II: CPT | Mod: PBBFAC,,, | Performed by: DIETITIAN, REGISTERED

## 2024-05-30 RX ORDER — IBUPROFEN 200 MG
1 TABLET ORAL DAILY
Qty: 14 PATCH | Refills: 0 | Status: SHIPPED | OUTPATIENT
Start: 2024-05-30 | End: 2024-06-05

## 2024-05-30 NOTE — PROGRESS NOTES
Individual Follow-Up Form    5/30/2024    Quit Date: 5/3/2024    Clinical Status of Patient: Outpatient    Length of Service: 60 minutes    Continuing Medication: yes  Patches    Other Medications: d/c varenicline     Target Symptoms: Withdrawal and medication side effects. The following were  rated moderate (3) to severe (4) on TCRS:  Moderate (3): crave/desire  Severe (4): none    Comments: .Patient presents in clinic today for follow up smoking 0 cigarettes per day an no longer vaping. Pt remains on tobacco cessation medication of  21 mg nicotine patch QD. No adverse effects noted at this time. Pt states he discontinued use of varenicline because he was not a fan of the vivid dreams. Pt doing well with maintaining his quit.  Pt's exhaled carbon monoxide level was 4 ppm as per Smokerlyzer. (non- smoker = 0-5 ppm.)  Reviewed coping strategies/habitual behavior/relapse prevention with patient. Patient understands he can call CTTS at any time.       Diagnosis: F17.200    Next Visit: 2 weeks

## 2024-05-31 LAB
OHS CV EVENT MONITOR DAY: 0
OHS CV HOLTER LENGTH DECIMAL HOURS: 48
OHS CV HOLTER LENGTH HOURS: 48
OHS CV HOLTER LENGTH MINUTES: 0
OHS CV HOLTER SINUS AVERAGE HR: 58
OHS CV HOLTER SINUS MAX HR: 100
OHS CV HOLTER SINUS MIN HR: 44

## 2024-06-03 ENCOUNTER — OFFICE VISIT (OUTPATIENT)
Dept: CARDIOLOGY | Facility: CLINIC | Age: 64
End: 2024-06-03
Payer: MEDICARE

## 2024-06-03 VITALS
BODY MASS INDEX: 23.02 KG/M2 | OXYGEN SATURATION: 97 % | SYSTOLIC BLOOD PRESSURE: 125 MMHG | HEIGHT: 68 IN | DIASTOLIC BLOOD PRESSURE: 80 MMHG | HEART RATE: 77 BPM | WEIGHT: 151.88 LBS

## 2024-06-03 DIAGNOSIS — I63.81 LACUNAR INFARCT, ACUTE: ICD-10-CM

## 2024-06-03 DIAGNOSIS — I48.0 PAROXYSMAL ATRIAL FIBRILLATION: Primary | ICD-10-CM

## 2024-06-03 PROCEDURE — 99999 PR PBB SHADOW E&M-EST. PATIENT-LVL IV: CPT | Mod: PBBFAC,GC,, | Performed by: STUDENT IN AN ORGANIZED HEALTH CARE EDUCATION/TRAINING PROGRAM

## 2024-06-04 DIAGNOSIS — I48.0 PAROXYSMAL ATRIAL FIBRILLATION: Primary | ICD-10-CM

## 2024-06-05 ENCOUNTER — OFFICE VISIT (OUTPATIENT)
Dept: NEUROLOGY | Facility: CLINIC | Age: 64
End: 2024-06-05
Payer: MEDICARE

## 2024-06-05 VITALS
SYSTOLIC BLOOD PRESSURE: 131 MMHG | DIASTOLIC BLOOD PRESSURE: 87 MMHG | WEIGHT: 151.81 LBS | HEIGHT: 68 IN | HEART RATE: 60 BPM | BODY MASS INDEX: 23.01 KG/M2

## 2024-06-05 DIAGNOSIS — Z79.01 CHRONIC ANTICOAGULATION: ICD-10-CM

## 2024-06-05 DIAGNOSIS — I10 ESSENTIAL HYPERTENSION: ICD-10-CM

## 2024-06-05 DIAGNOSIS — G63 VITAMIN B6 DEFICIENCY NEUROPATHY: ICD-10-CM

## 2024-06-05 DIAGNOSIS — E53.1 VITAMIN B6 DEFICIENCY NEUROPATHY: ICD-10-CM

## 2024-06-05 DIAGNOSIS — I63.311 CEREBROVASCULAR ACCIDENT (CVA) DUE TO THROMBOSIS OF RIGHT MIDDLE CEREBRAL ARTERY: Primary | ICD-10-CM

## 2024-06-05 DIAGNOSIS — F17.200 TOBACCO USE DISORDER, SEVERE, DEPENDENCE: ICD-10-CM

## 2024-06-05 DIAGNOSIS — I48.0 PAROXYSMAL ATRIAL FIBRILLATION: ICD-10-CM

## 2024-06-05 DIAGNOSIS — E78.2 HYPERLIPIDEMIA, MIXED: ICD-10-CM

## 2024-06-05 PROCEDURE — 1159F MED LIST DOCD IN RCRD: CPT | Mod: CPTII,S$GLB,, | Performed by: STUDENT IN AN ORGANIZED HEALTH CARE EDUCATION/TRAINING PROGRAM

## 2024-06-05 PROCEDURE — 3288F FALL RISK ASSESSMENT DOCD: CPT | Mod: CPTII,S$GLB,, | Performed by: STUDENT IN AN ORGANIZED HEALTH CARE EDUCATION/TRAINING PROGRAM

## 2024-06-05 PROCEDURE — 1160F RVW MEDS BY RX/DR IN RCRD: CPT | Mod: CPTII,S$GLB,, | Performed by: STUDENT IN AN ORGANIZED HEALTH CARE EDUCATION/TRAINING PROGRAM

## 2024-06-05 PROCEDURE — 3044F HG A1C LEVEL LT 7.0%: CPT | Mod: CPTII,S$GLB,, | Performed by: STUDENT IN AN ORGANIZED HEALTH CARE EDUCATION/TRAINING PROGRAM

## 2024-06-05 PROCEDURE — 99999 PR PBB SHADOW E&M-EST. PATIENT-LVL III: CPT | Mod: PBBFAC,,, | Performed by: STUDENT IN AN ORGANIZED HEALTH CARE EDUCATION/TRAINING PROGRAM

## 2024-06-05 PROCEDURE — 1101F PT FALLS ASSESS-DOCD LE1/YR: CPT | Mod: CPTII,S$GLB,, | Performed by: STUDENT IN AN ORGANIZED HEALTH CARE EDUCATION/TRAINING PROGRAM

## 2024-06-05 PROCEDURE — 99215 OFFICE O/P EST HI 40 MIN: CPT | Mod: S$GLB,,, | Performed by: STUDENT IN AN ORGANIZED HEALTH CARE EDUCATION/TRAINING PROGRAM

## 2024-06-05 PROCEDURE — 4010F ACE/ARB THERAPY RXD/TAKEN: CPT | Mod: CPTII,S$GLB,, | Performed by: STUDENT IN AN ORGANIZED HEALTH CARE EDUCATION/TRAINING PROGRAM

## 2024-06-05 PROCEDURE — 3075F SYST BP GE 130 - 139MM HG: CPT | Mod: CPTII,S$GLB,, | Performed by: STUDENT IN AN ORGANIZED HEALTH CARE EDUCATION/TRAINING PROGRAM

## 2024-06-05 PROCEDURE — 1126F AMNT PAIN NOTED NONE PRSNT: CPT | Mod: CPTII,S$GLB,, | Performed by: STUDENT IN AN ORGANIZED HEALTH CARE EDUCATION/TRAINING PROGRAM

## 2024-06-05 PROCEDURE — 3008F BODY MASS INDEX DOCD: CPT | Mod: CPTII,S$GLB,, | Performed by: STUDENT IN AN ORGANIZED HEALTH CARE EDUCATION/TRAINING PROGRAM

## 2024-06-05 PROCEDURE — 3079F DIAST BP 80-89 MM HG: CPT | Mod: CPTII,S$GLB,, | Performed by: STUDENT IN AN ORGANIZED HEALTH CARE EDUCATION/TRAINING PROGRAM

## 2024-06-05 NOTE — PROGRESS NOTES
Vascular Neurology Clinic  Initial Consult  Patient Name: Osiel Kerr  MRN: [id number]    CC: R CR infarct    HPI: Osiel Kerr is a 64 y.o. R-handed male w/ PMH significant for pAfib, Tobacco abuse, HLD, HTN presenting as referral status-post hospital admission on 4/21/2024.   Presented w/ LSFD and dysarthria w/ LKW >48 hrs PTA    - OOW for TNK and no LVO on MRA   Discharged to Home w/ NIHSS of 0, mrS of 1.   - Patient does report that he still had facial droop and a slurred speech     - 1 week past the discharge speech went to normal     - 2-3 days after DC his face returned to normal   Seen cards on 6/03/2024   - Pending another ECHO and cardiac event monitor   - States that he feels when he is in Afib    - Has been diagnosed w/ Afib x 2 1/2 years    - No previous procedures    - Does monitor Afib w/ apple watch - Afib recorded 4/17- 4/21    - Mid May  - skipped 1 dose due to hard palate bleeding    - At times would skip one dose  of AC, but never more than that   Currently on Eliquis 5 mg BID and on Atorvastatin 80 mg (previously not on a statin)   Nicotine Patch currently    - May 3rd was the last cigarette as reported by the patient.      Brain Imaging  MRI Brain w/o       Right corona radiata acute lacunar type infarct.  No intracranial hemorrhage or major vascular distribution infarct.  Changes of mild chronic microvascular ischemic disease and cerebral volume loss with few remote right-sided lacunar type infarcts.     Vessel Imaging  MRA H/N  Extracranial carotid circulation: No hemodynamically significant stenosis, aneurysmal dilatation, or dissection.  Extracranial vertebral circulation: Left slightly dominant.  No hemodynamically significant stenosis, aneurysmal dilatation, or dissection.  Intracranial Arteries: No focal high-grade stenosis, occlusion, or aneurysm.  Bilateral P comms and A-comm not identified and likely markedly hypoplastic or absent.  Cardiac Evaluation  ECHO     Left Ventricle: The left  ventricle is normal in size. Ventricular mass is normal. Normal wall thickness. Normal wall motion. There is normal systolic function. Ejection fraction by visual approximation is 65%. There is normal diastolic function.    Right Ventricle: Normal right ventricular cavity size. Wall thickness is normal. Right ventricle wall motion  is normal. Systolic function is normal.    Left Atrium: Agitated saline study of the atrial septum is positive, intracardiac shunt at atrial level. There is a very small (<10 bubbles) atrial shunt.    Mitral Valve: There is mild regurgitation.    Pulmonary Artery: The estimated pulmonary artery systolic pressure is 30 mmHg.    IVC/SVC: Normal venous pressure at 3 mmHg.       Relevant Lab work   Recent Labs   Lab 24  1221 24  0753   Hemoglobin A1C  --  5.7 H   LDL Cholesterol 114.0  --    HDL 51  --    Triglycerides 255 H  --    Cholesterol 216 H  --          NIH Stroke Scale:  Interval: baseline (upon arrival/admit)  Level of Consciousness: 0 - alert  LOC Questions: 0 - answers both correctly  LOC Commands: 0 - performs both correctly  Best Gaze: 0 - normal  Visual: 0 - no visual loss  Facial Palsy: 1 - minor  Motor Left Arm: 0 - no drift  Motor Right Arm: 0 - no drift  Motor Left Le - no drift  Motor Right Le - no drift  Limb Ataxia: 0 - absent  Sensory: 0 - normal  Best Language: 0 - no aphasia  Dysarthria: 0 - normal articulation  Extinction and Inattention: 0 - no neglect  NIH Stroke Scale Total: 1         Review of Systems:  General: No fevers, chills  Eyes: No changes in vision  ENT: No changes in hearing  Respiratory: No SOB  CV: No chest pain, palpitations  GI: No diarrhea, blood in stool  Urinary: No dysuria, hematuria  Skin: No rashes  Neurological: No weakness, confusion  Psychiatric: No auditory nor visual hallucinations      Past Medical History  Past Medical History:   Diagnosis Date    Essential (primary) hypertension     Mixed hyperlipidemia      Paroxysmal A-fib     Right-sided lacunar infarction 2024       Medications    Current Outpatient Medications:     atorvastatin (LIPITOR) 80 MG tablet, Take 1 tablet (80 mg total) by mouth once daily., Disp: 90 tablet, Rfl: 3    ELIQUIS 5 mg Tab, Take 1 tablet (5 mg total) by mouth 2 (two) times daily., Disp: 90 tablet, Rfl: 3    lisinopriL 10 MG tablet, Take 1 tablet (10 mg total) by mouth once daily., Disp: 90 tablet, Rfl: 3    metoprolol succinate (TOPROL-XL) 25 MG 24 hr tablet, Take 0.5 tablets (12.5 mg total) by mouth once daily., Disp: 90 tablet, Rfl: 1    nicotine (NICODERM CQ) 21 mg/24 hr, Place 1 patch onto the skin once daily. Change location daily., Disp: 14 patch, Rfl: 0  Any other notable medications as documented in HPI    Allergies  Review of patient's allergies indicates:   Allergen Reactions    Penicillin Other (See Comments)     As a child       Social History  Social History     Socioeconomic History    Marital status:    Tobacco Use    Smoking status: Former     Current packs/day: 0.00     Average packs/day: 1 pack/day for 47.3 years (45.8 ttl pk-yrs)     Types: Cigarettes, Vaping with nicotine     Start date:      Quit date: 2024     Years since quittin.0    Smokeless tobacco: Never    Tobacco comments:     Smoked 4 cigarettes on 2024     Social Determinants of Health     Financial Resource Strain: Low Risk  (2024)    Overall Financial Resource Strain (CARDIA)     Difficulty of Paying Living Expenses: Not very hard   Food Insecurity: No Food Insecurity (2024)    Hunger Vital Sign     Worried About Running Out of Food in the Last Year: Never true     Ran Out of Food in the Last Year: Never true   Transportation Needs: No Transportation Needs (2024)    PRAPARE - Transportation     Lack of Transportation (Medical): No     Lack of Transportation (Non-Medical): No   Physical Activity: Insufficiently Active (2024)    Exercise Vital Sign     Days of  "Exercise per Week: 4 days     Minutes of Exercise per Session: 20 min   Stress: No Stress Concern Present (4/22/2024)    [employer] [place] [employer] - Occupational Stress Questionnaire     Feeling of Stress : Only a little   Housing Stability: Low Risk  (4/22/2024)    Housing Stability Vital Sign     Unable to Pay for Housing in the Last Year: No     Homeless in the Last Year: No     Any other notable Social History as documented in HPI.    Family History  No family history on file.  Any other notable FMH as documented in HPI.    Physical Exam  /87 (BP Location: Right arm, Patient Position: Sitting, BP Method: Medium (Automatic))   Pulse 60   Ht 5' 8" (1.727 m)   Wt 68.9 kg (151 lb 12.9 oz)   BMI 23.08 kg/m²     General: Well-developed, well-groomed. No apparent distress  HENT: Atraumatic.    Cardiovascular: Regular rate and rhythm  Chest: No audible wheezes, stridor, ronchi appreciated.  Musculoskeletal: No peripheral edema    Neurologic Exam: The patient is awake, alert and oriented to person, place, time and situation. Attentive, vigilant during exam. Language is fluent. Naming & repetition intact, +2-step commands.  Fund of knowledge is appropriate. Well organized thoughts.      Cranial nerves:   CN II: Visual fields are full to confrontation. Pupils are 4 mm and briskly reactive to light.  CN III, IV, VI: EOMI, no nystagmus, no ptosis  CN V: Facial sensation is intact in all 3 divisions bilaterally.  CN VII: Mild L facial asymmetry noted   CN VIII: Hearing is normal bilaterally  CN IX, X: Palate elevates symmetrically. Phonation is normal.  CN XI: Head turning and shoulder shrug are intact  CN XII: Tongue is midline with normal movements and no atrophy.    Motor examination of all extremities demonstrates normal bulk and tone in all four limbs. There are no atrophy or fasciculations.      Left Right   Left Right   Deltoid 5/5 5/5  Hip Flexion 5/5 5/5   Biceps 5/5 5/5  Hip Extension 5/5 " "5/5   Triceps 5/5 5/5  Knee Flexion 5/5 5/5   Wrist Ext 5-/5 5/5  Knee Extension 5/5 5/5   Finger Abd 5-/5 5/5  Ankle dorsiflex 5/5 5/5       Ankle plantar flex 5/5 5/5       Sensory examination is normal light touch in BUE and BLE.  Romberg is negative.    Deep tendon reflexes  No clonus. Negative Moore's    Gait: Normal tandem, and casual gait. Heel and toe walking are normal.     Coordination: No dysmetria with finger-to-nose. Rapid alternating movements and fine finger movements are intact.     Assessment and Plan  R CR infarct, minimal residual deficits     - Agree with continuation of AC   - Eliquis 5 mg BID is reasonable to continue, as wouldn't call this a "failure" of the drug since it doesn't eliminate the risk of stroke all together, even w/ 100% compliance   - Reasonable to consider  as the etiology for this particular stroke due to RF profile   - RF modification as below   - Patient reports that he is symptomatic when he is in Afib and "feels" it so agree w/ possible rhythm control strategy, but would still require AC indefinitely.   - Do not feel that patient's event is 2/2 cryptogenic stroke, thus would avoid PFO closure at this time as unsure if the risks of procedure outweigh the benefits in someone who will be on AC indefinitely regardless and thus would have a low risk for DVT development.  - Smoking cessation    Recommendations:   Antiplatelet/Anticoagulation: Eliquis 5 mg BID   Lipid Management: Atorvastatin 80 mg QHS (long-term goal LDL < 70).   - Monitoring for liver dysfunction and myopathy is suggested for statins. To be addressed by PCP  Diabetes: Target hemoglobin A1c <7%, measured 2X/year or quarterly if not meeting goals  Hypertension: Long term goal is normotension w/ target BP of less than 130/80 mmHg  Sleep: Denies any symptoms of SHANTA. Consider Sleep Medicine follow up in the future if suspicion for SHANTA occurs.   Smoking: Goal is smoking cessation and encouragement not to start " smoking in the future.    - Smoking cessation participation encouraged.    - Patch in place  Diet: Discussed Mediterranean Diet recommendations (Adopted from Ajit et al, [facility], 2018.)  - Eat primarily plant-based foods, such as fruits and vegetables, whole grains, legumes (beans) and nuts  - Limit refined carbohydrates (white pasta, bread, rice).  - Replace butter with healthy fats such as olive oil.  - Use herbs and spices instead of salt to flavor foods.  - Limit red meat and processed meats to no more than a few times a month.  - Avoid sugary sodas, bakery goods, and sweets.  - Eat fish and poultry at least twice a week.  - Get plenty of exercise (150 minutes per week).    RTC in PRN    Problem List Items Addressed This Visit          Neuro    Right-sided lacunar infarction    Vitamin B6 deficiency neuropathy       Cardiac/Vascular    Essential hypertension    Paroxysmal atrial fibrillation    Hyperlipidemia, mixed       Hematology    Chronic anticoagulation       Other    Tobacco use disorder, severe, dependence - Primary           Hallie Howard MD  Vascular Neurology  [facility] [address] [place] [address]

## 2024-06-11 ENCOUNTER — HOSPITAL ENCOUNTER (OUTPATIENT)
Dept: CARDIOLOGY | Facility: HOSPITAL | Age: 64
Discharge: HOME | End: 2024-06-11
Attending: STUDENT IN AN ORGANIZED HEALTH CARE EDUCATION/TRAINING PROGRAM
Payer: MEDICARE

## 2024-06-11 VITALS
HEART RATE: 58 BPM | BODY MASS INDEX: 22.88 KG/M2 | DIASTOLIC BLOOD PRESSURE: 78 MMHG | WEIGHT: 151 LBS | SYSTOLIC BLOOD PRESSURE: 122 MMHG | HEIGHT: 68 IN

## 2024-06-11 DIAGNOSIS — I63.81 LACUNAR INFARCT, ACUTE: ICD-10-CM

## 2024-06-11 LAB
BSA FOR ECHO PROCEDURE: 1.81 M2
PISA TR MAX VEL: 2.25 M/S
RA PRESSURE ESTIMATED: 3 MMHG
RV TB RVSP: 5 MMHG
SINUS: 3.3 CM
STJ: 2.8 CM
TR MAX PG: 20 MMHG
TV REST PULMONARY ARTERY PRESSURE: 23 MMHG

## 2024-06-11 PROCEDURE — 93308 TTE F-UP OR LMTD: CPT | Mod: 26,,, | Performed by: INTERNAL MEDICINE

## 2024-06-11 PROCEDURE — 93308 TTE F-UP OR LMTD: CPT

## 2024-06-11 NOTE — PROGRESS NOTES
Procedure explained . 22 g sl started in left forearm for bubble study. Bubble study done x 4 with and without valsalva via sl.  Tolerated well. Sl d/jerson after. Pressure applied

## 2024-06-12 NOTE — PROGRESS NOTES
Subjective     Chief Complaint: stroke    History of Present Illness:  Mr. Osiel Kerr is a 64 y.o. male with PMH of HTN, tobacco use, AF (on eliquis, no documented AF in epic) who presents for stroke follow up. Recently presented to the ED with left side facial droop, and slurred speech on 4/21. LKN 4/19, approx >48 hrs PTA. Vascular Neurology consulted for right acute lacunar infarct found on MRI Brain w/o Contrast. Outside of TNK window so not given. No documented evidence of AF while inpatient or recent 48 hour Holter. TTE with possible PFO, see bellow. Reports occasional palpitations and has what appears to be AF recorded from iwatch. He denies orthopnea, LE edema, syncope, LH.     PAST HISTORY:     Past Medical History:   Diagnosis Date    Essential (primary) hypertension     Mixed hyperlipidemia     Paroxysmal A-fib     Right-sided lacunar infarction 04/21/2024       Cardiac History   Results for orders placed during the hospital encounter of 04/21/24    Echo Saline Bubble? Yes    Interpretation Summary    Left Ventricle: The left ventricle is normal in size. Ventricular mass is normal. Normal wall thickness. Normal wall motion. There is normal systolic function. Ejection fraction by visual approximation is 65%. There is normal diastolic function.    Right Ventricle: Normal right ventricular cavity size. Wall thickness is normal. Right ventricle wall motion  is normal. Systolic function is normal.    Left Atrium: Agitated saline study of the atrial septum is positive, intracardiac shunt at atrial level. There is a very small (<10 bubbles) atrial shunt.    Mitral Valve: There is mild regurgitation.    Pulmonary Artery: The estimated pulmonary artery systolic pressure is 30 mmHg.    IVC/SVC: Normal venous pressure at 3 mmHg.    EF   Date Value Ref Range Status   04/22/2024 65 % Final     No results found for this or any previous visit.      MEDICATIONS:     Current Outpatient Medications on File Prior to  "Visit   Medication Sig    atorvastatin (LIPITOR) 80 MG tablet Take 1 tablet (80 mg total) by mouth once daily.    ELIQUIS 5 mg Tab Take 1 tablet (5 mg total) by mouth 2 (two) times daily.    lisinopriL 10 MG tablet Take 1 tablet (10 mg total) by mouth once daily.    metoprolol succinate (TOPROL-XL) 25 MG 24 hr tablet Take 0.5 tablets (12.5 mg total) by mouth once daily.     No current facility-administered medications on file prior to visit.       SUBJECTIVE:     Review of Systems   Respiratory:  Negative for sputum production.    Cardiovascular:  Negative for chest pain, palpitations, orthopnea, claudication and PND.        OBJECTIVE:     Vital Signs:  Vitals:    06/03/24 1301   BP: 125/80   Pulse: 77   SpO2: 97%   Weight: 68.9 kg (151 lb 14.4 oz)   Height: 5' 8" (1.727 m)     Body mass index is 23.1 kg/m².     Physical Exam  HENT:      Head: Normocephalic and atraumatic.   Eyes:      Conjunctiva/sclera: Conjunctivae normal.   Cardiovascular:      Rate and Rhythm: Normal rate and regular rhythm.      Heart sounds: Normal heart sounds.   Pulmonary:      Effort: Pulmonary effort is normal. No respiratory distress.      Breath sounds: Normal breath sounds. No wheezing.   Abdominal:      General: There is no distension.      Palpations: Abdomen is soft.      Tenderness: There is no abdominal tenderness.   Musculoskeletal:      Cervical back: Normal range of motion.   Neurological:      Mental Status: He is alert and oriented to person, place, and time.   Psychiatric:         Mood and Affect: Affect normal.         Laboratory  Lab Results   Component Value Date    WBC 8.20 04/22/2024    HGB 16.3 04/22/2024    HCT 48.4 04/22/2024    MCV 88 04/22/2024     04/22/2024     BMP  Lab Results   Component Value Date     04/22/2024    K 4.9 04/22/2024     04/22/2024    CO2 24 04/22/2024    BUN 19 04/22/2024    CREATININE 1.2 04/22/2024    CALCIUM 9.9 04/22/2024    ANIONGAP 9 04/22/2024    EGFRNORACEVR >60.0 " "04/22/2024     Lab Results   Component Value Date    INR 1.1 04/22/2024     Lab Results   Component Value Date    HGBA1C 5.7 (H) 04/22/2024     No results for input(s): "POCTGLUCOSE" in the last 72 hours.      ASSESSMENT & PLAN:       pAF  Recent cryptogenic CVA  EKG today with NSR. No documented AF in our system although iwatch strip with clear AF. Will obtain cardiac monitor. Repeat limited TTE with adequate valsalva to assess for PFO. Once documented AF is obtained will refer to EP. Also will refer to IC if has PFO.     Discussed with Dr. Cespedes  - staff attestation to follow      Ethan Evans MD  Cardiovascular Disease Fellow PGY-5     "

## 2024-06-17 ENCOUNTER — CLINICAL SUPPORT (OUTPATIENT)
Dept: SMOKING CESSATION | Facility: CLINIC | Age: 64
End: 2024-06-17
Payer: COMMERCIAL

## 2024-06-17 DIAGNOSIS — F17.200 NICOTINE DEPENDENCE: Primary | ICD-10-CM

## 2024-06-17 PROCEDURE — 99404 PREV MED CNSL INDIV APPRX 60: CPT | Mod: S$GLB,,, | Performed by: DIETITIAN, REGISTERED

## 2024-06-17 PROCEDURE — 99999 PR PBB SHADOW E&M-EST. PATIENT-LVL II: CPT | Mod: PBBFAC,,, | Performed by: DIETITIAN, REGISTERED

## 2024-06-17 RX ORDER — IBUPROFEN 200 MG
1 TABLET ORAL DAILY
Qty: 14 PATCH | Refills: 0 | Status: SHIPPED | OUTPATIENT
Start: 2024-06-17

## 2024-06-17 NOTE — PROGRESS NOTES
"Individual Follow-Up Form    6/17/2024    Quit Date: 5/3/2024    Clinical Status of Patient: Outpatient    Length of Service: 60 minutes    Continuing Medication: yes  Patches    Other Medications: none     Target Symptoms: Withdrawal and medication side effects. The following were  rated moderate (3) to severe (4) on TCRS:  Moderate (3): crave/desire  Severe (4): none    Comments: Patient presents in clinic today for follow up smoking 0 cigarettes per day today, although he shares he did smoke 4 cigarettes yesterday (6/16/2024). Pt remains on tobacco cessation medication of  21 mg nicotine patch QD. No adverse effects noted at this time. 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 Pt's exhaled carbon monoxide level was 7 ppm as per Smokerlyzer. (non- smoker = 0-5 ppm.). Discussed "Problems/Stress/Weight" session handout and pt verbalized understanding.  Reviewed coping strategies/habitual behaviors/relapse prevention with patient. Patient understands he can call CTTS at any time.       Diagnosis: F17.200    Next Visit: 2 weeks    "

## 2024-06-25 ENCOUNTER — CLINICAL SUPPORT (OUTPATIENT)
Dept: CARDIOLOGY | Facility: HOSPITAL | Age: 64
End: 2024-06-25
Attending: STUDENT IN AN ORGANIZED HEALTH CARE EDUCATION/TRAINING PROGRAM
Payer: MEDICARE

## 2024-06-25 DIAGNOSIS — I63.81 LACUNAR INFARCT, ACUTE: ICD-10-CM

## 2024-06-25 DIAGNOSIS — I48.0 PAROXYSMAL ATRIAL FIBRILLATION: ICD-10-CM

## 2024-06-25 PROCEDURE — 93271 ECG/MONITORING AND ANALYSIS: CPT

## 2024-07-03 ENCOUNTER — CLINICAL SUPPORT (OUTPATIENT)
Dept: SMOKING CESSATION | Facility: CLINIC | Age: 64
End: 2024-07-03
Payer: COMMERCIAL

## 2024-07-03 DIAGNOSIS — F17.200 NICOTINE DEPENDENCE: ICD-10-CM

## 2024-07-03 PROCEDURE — 99404 PREV MED CNSL INDIV APPRX 60: CPT | Mod: S$GLB,,, | Performed by: DIETITIAN, REGISTERED

## 2024-07-03 PROCEDURE — 99999 PR PBB SHADOW E&M-EST. PATIENT-LVL II: CPT | Mod: PBBFAC,,, | Performed by: DIETITIAN, REGISTERED

## 2024-07-03 RX ORDER — IBUPROFEN 200 MG
1 TABLET ORAL DAILY
Qty: 14 PATCH | Refills: 0 | Status: SHIPPED | OUTPATIENT
Start: 2024-07-03

## 2024-07-03 NOTE — PROGRESS NOTES
"Individual Follow-Up Form    7/3/2024    Quit Date: 5/3/2024    Clinical Status of Patient: Outpatient    Length of Service: 60 minutes    Continuing Medication: yes  Patches    Other Medications: none     Target Symptoms: Withdrawal and medication side effects. The following were  rated moderate (3) to severe (4) on TCRS:  Moderate (3): crave/desire  Severe (4): none    Comments: Patient presents in clinic today for follow up smoking 0 cigarettes per day. Pt remains on tobacco cessation medication of  21 mg nicotine patch QD. No adverse effects noted at this time. Pt doing well with maintaining his quit.  Pt's exhaled carbon monoxide level was 3 ppm as per Smokerlyzer. (non- smoker = 0-5 ppm.) Pt states he was very angry at himself for having that one-day lapse and seeing how much his CO level worsened, so much so that he has not smoked since that day (about two weeks ago) and wants to have his CO level tested again today. Pt states "you made my day by letting me see how I've improved". Discussed "Lapses and Relapses" session packet and pt verbalized understanding. Reviewed coping strategies/habitual behavior/relapse prevention with patient. Patient understands he can call CTTS at any time.       Diagnosis: F17.200    Next Visit: 2 weeks    "

## 2024-07-09 ENCOUNTER — OFFICE VISIT (OUTPATIENT)
Dept: CARDIOLOGY | Facility: CLINIC | Age: 64
End: 2024-07-09
Payer: MEDICARE

## 2024-07-09 VITALS
HEIGHT: 68 IN | HEART RATE: 69 BPM | WEIGHT: 153 LBS | DIASTOLIC BLOOD PRESSURE: 85 MMHG | OXYGEN SATURATION: 98 % | SYSTOLIC BLOOD PRESSURE: 122 MMHG | BODY MASS INDEX: 23.19 KG/M2

## 2024-07-09 DIAGNOSIS — E78.2 HYPERLIPIDEMIA, MIXED: ICD-10-CM

## 2024-07-09 DIAGNOSIS — Q21.12 PFO (PATENT FORAMEN OVALE): ICD-10-CM

## 2024-07-09 DIAGNOSIS — R00.2 PALPITATIONS: ICD-10-CM

## 2024-07-09 DIAGNOSIS — I48.0 PAROXYSMAL ATRIAL FIBRILLATION: Primary | ICD-10-CM

## 2024-07-09 DIAGNOSIS — I10 ESSENTIAL HYPERTENSION: ICD-10-CM

## 2024-07-09 PROCEDURE — 99999 PR PBB SHADOW E&M-EST. PATIENT-LVL IV: CPT | Mod: PBBFAC,GC,, | Performed by: STUDENT IN AN ORGANIZED HEALTH CARE EDUCATION/TRAINING PROGRAM

## 2024-07-09 RX ORDER — METOPROLOL SUCCINATE 25 MG/1
25 TABLET, EXTENDED RELEASE ORAL DAILY
Qty: 90 TABLET | Refills: 1 | Status: SHIPPED | OUTPATIENT
Start: 2024-07-09

## 2024-07-09 RX ORDER — APIXABAN 5 MG/1
5 TABLET, FILM COATED ORAL 2 TIMES DAILY
Qty: 90 TABLET | Refills: 3 | Status: SHIPPED | OUTPATIENT
Start: 2024-07-09

## 2024-07-09 RX ORDER — LISINOPRIL 10 MG/1
10 TABLET ORAL DAILY
Qty: 90 TABLET | Refills: 3 | Status: SHIPPED | OUTPATIENT
Start: 2024-07-09

## 2024-07-09 RX ORDER — ATORVASTATIN CALCIUM 80 MG/1
80 TABLET, FILM COATED ORAL DAILY
Qty: 90 TABLET | Refills: 3 | Status: SHIPPED | OUTPATIENT
Start: 2024-07-09 | End: 2025-07-09

## 2024-07-09 NOTE — PROGRESS NOTES
Subjective     Chief Complaint:    History of Present Illness:  Mr. Osiel Kerr is a 64 y.o. male with PMH of HTN, tobacco use, AF (on eliquis, no documented AF in epic) who presents for stroke follow up. Recently presented to the ED with left side facial droop, and slurred speech on 4/21. LKN 4/19, approx >48 hrs PTA. Vascular Neurology consulted for right acute lacunar infarct found on MRI Brain w/o Contrast. Outside of TNK window so not given. No documented evidence of AF while inpatient or recent 48 hour Holter. TTE with possible PFO.  Repeat MANNY at last visit confirmed small PFO.  Additionally he reports palpitations.  States he has episodes of heart racing heart rates up to 200.  Symptoms can last up to 30 minutes.  He has had resolution with using ice water on his face.  Associated occasional lightheadedness.  Additionally he has clear episodes of what appears to be atrial fibrillation recorded on iwatch, although no documented AF in our system.  He denies orthopnea, lower extremity edema, dyspnea on exertion, syncope, angina.      Results for orders placed during the hospital encounter of 06/11/24    Echo Saline Bubble? Yes    Interpretation Summary    Left Ventricle: The left ventricle is normal in size. Normal wall thickness. Normal wall motion. There is normal systolic function with a visually estimated ejection fraction of 60 - 65%. There is normal diastolic function.    Right Ventricle: Normal right ventricular cavity size. Wall thickness is normal. Systolic function is normal.    Left Atrium: Agitated saline study of the atrial septum is positive, intracardiac shunt at atrial level. There is a very small (<10 bubbles) shunt present with bubbles visualized crossing the interatrial septum.    Mitral Valve: There is mild regurgitation.    Pulmonary Artery: The estimated pulmonary artery systolic pressure is 23 mmHg.    IVC/SVC: Normal venous pressure at 3 mmHg.      PAST HISTORY:     Past Medical  "History:   Diagnosis Date    Essential (primary) hypertension     Mixed hyperlipidemia     Paroxysmal A-fib     Right-sided lacunar infarction 04/21/2024       Cardiac History   Results for orders placed during the hospital encounter of 06/11/24    Echo Saline Bubble? Yes    Interpretation Summary    Left Ventricle: The left ventricle is normal in size. Normal wall thickness. Normal wall motion. There is normal systolic function with a visually estimated ejection fraction of 60 - 65%. There is normal diastolic function.    Right Ventricle: Normal right ventricular cavity size. Wall thickness is normal. Systolic function is normal.    Left Atrium: Agitated saline study of the atrial septum is positive, intracardiac shunt at atrial level. There is a very small (<10 bubbles) shunt present with bubbles visualized crossing the interatrial septum.    Mitral Valve: There is mild regurgitation.    Pulmonary Artery: The estimated pulmonary artery systolic pressure is 23 mmHg.    IVC/SVC: Normal venous pressure at 3 mmHg.    EF   Date Value Ref Range Status   04/22/2024 65 % Final     No results found for this or any previous visit.      MEDICATIONS:     Current Outpatient Medications on File Prior to Visit   Medication Sig    nicotine (NICODERM CQ) 21 mg/24 hr Place 1 patch onto the skin once daily. Change location daily.     No current facility-administered medications on file prior to visit.       SUBJECTIVE:     Review of Systems   Respiratory:  Negative for shortness of breath.    Cardiovascular:  Positive for palpitations. Negative for chest pain, orthopnea, claudication, leg swelling and PND.        OBJECTIVE:     Vital Signs:  Vitals:    07/09/24 1512   BP: 122/85   Pulse: 69   SpO2: 98%   Weight: 69.4 kg (153 lb)   Height: 5' 8" (1.727 m)     Body mass index is 23.26 kg/m².     Physical Exam  HENT:      Head: Normocephalic and atraumatic.   Eyes:      Conjunctiva/sclera: Conjunctivae normal.   Neck:      Comments: No " "JVD at 30°  Cardiovascular:      Rate and Rhythm: Normal rate and regular rhythm.      Heart sounds: Normal heart sounds.   Pulmonary:      Effort: Pulmonary effort is normal. No respiratory distress.      Breath sounds: Normal breath sounds. No wheezing.   Abdominal:      General: There is no distension.      Palpations: Abdomen is soft.      Tenderness: There is no abdominal tenderness.   Musculoskeletal:      Cervical back: Normal range of motion.      Right lower leg: No edema.      Left lower leg: No edema.   Neurological:      General: No focal deficit present.      Mental Status: He is alert and oriented to person, place, and time.   Psychiatric:         Mood and Affect: Affect normal.         Laboratory  Lab Results   Component Value Date    WBC 7.30 07/10/2024    HGB 14.5 07/10/2024    HCT 44.6 07/10/2024    MCV 88 07/10/2024     07/10/2024     BMP  Lab Results   Component Value Date     07/10/2024    K 4.8 07/10/2024     07/10/2024    CO2 25 07/10/2024    BUN 13 07/10/2024    CREATININE 1.3 07/10/2024    CALCIUM 9.9 07/10/2024    ANIONGAP 7 (L) 07/10/2024    EGFRNORACEVR >60 07/10/2024     Lab Results   Component Value Date    INR 1.1 04/22/2024     Lab Results   Component Value Date    HGBA1C 5.7 (H) 07/10/2024     No results for input(s): "POCTGLUCOSE" in the last 72 hours.    Diagnostic Results:  Labs: Reviewed  ECG: Reviewed  Echo: Reviewed    ASSESSMENT & PLAN:     PFO  No indication for closure given likely AFib noted on eye watch and his PFO a small no subsequent RV dysfunction    Palpitations  EF symptoms concerning for SVT and iwatch atrial fibrillation  Currently undergoing cardiac event monitor, results pending  Continue Eliquis and metoprolol  Referral to electrophysiology    Hypertension  Well controlled on current regimen    Hyperlipidemia  Lab Results   Component Value Date    LDLCALC 66.6 07/10/2024     Well-controlled.  Continue atorvastatin 80    CVA  No residual " deficits  Treatment for atrial fibrillation, hypertension, hyperlipidemia as above      Discussed with Dr. Singh  - staff attestation to follow      Ethan Evans MD  Cardiovascular Disease Fellow PGY-6

## 2024-07-10 ENCOUNTER — LAB VISIT (OUTPATIENT)
Dept: LAB | Facility: OTHER | Age: 64
End: 2024-07-10
Attending: INTERNAL MEDICINE
Payer: MEDICARE

## 2024-07-10 ENCOUNTER — OFFICE VISIT (OUTPATIENT)
Dept: SLEEP MEDICINE | Facility: CLINIC | Age: 64
End: 2024-07-10
Payer: MEDICARE

## 2024-07-10 VITALS
SYSTOLIC BLOOD PRESSURE: 118 MMHG | HEIGHT: 68 IN | HEART RATE: 62 BPM | WEIGHT: 152 LBS | BODY MASS INDEX: 23.04 KG/M2 | DIASTOLIC BLOOD PRESSURE: 78 MMHG

## 2024-07-10 DIAGNOSIS — K64.9 BLEEDING HEMORRHOID: ICD-10-CM

## 2024-07-10 DIAGNOSIS — F51.09 OTHER INSOMNIA NOT DUE TO A SUBSTANCE OR KNOWN PHYSIOLOGICAL CONDITION: ICD-10-CM

## 2024-07-10 DIAGNOSIS — I63.81 LACUNAR INFARCT, ACUTE: ICD-10-CM

## 2024-07-10 DIAGNOSIS — I63.81 RIGHT-SIDED LACUNAR INFARCTION: ICD-10-CM

## 2024-07-10 DIAGNOSIS — R06.83 SNORING: Primary | ICD-10-CM

## 2024-07-10 DIAGNOSIS — R73.03 PREDIABETES: ICD-10-CM

## 2024-07-10 DIAGNOSIS — R40.0 DAYTIME SLEEPINESS: ICD-10-CM

## 2024-07-10 LAB
ALBUMIN SERPL BCP-MCNC: 4 G/DL (ref 3.5–5.2)
ALP SERPL-CCNC: 81 U/L (ref 55–135)
ALT SERPL W/O P-5'-P-CCNC: 29 U/L (ref 10–44)
ANION GAP SERPL CALC-SCNC: 7 MMOL/L (ref 8–16)
AST SERPL-CCNC: 23 U/L (ref 10–40)
BASOPHILS # BLD AUTO: 0.06 K/UL (ref 0–0.2)
BASOPHILS NFR BLD: 0.8 % (ref 0–1.9)
BILIRUB SERPL-MCNC: 0.9 MG/DL (ref 0.1–1)
BUN SERPL-MCNC: 13 MG/DL (ref 8–23)
CALCIUM SERPL-MCNC: 9.9 MG/DL (ref 8.7–10.5)
CHLORIDE SERPL-SCNC: 106 MMOL/L (ref 95–110)
CHOLEST SERPL-MCNC: 130 MG/DL (ref 120–199)
CHOLEST/HDLC SERPL: 2.6 {RATIO} (ref 2–5)
CO2 SERPL-SCNC: 25 MMOL/L (ref 23–29)
CREAT SERPL-MCNC: 1.3 MG/DL (ref 0.5–1.4)
DIFFERENTIAL METHOD BLD: NORMAL
EOSINOPHIL # BLD AUTO: 0.2 K/UL (ref 0–0.5)
EOSINOPHIL NFR BLD: 2.3 % (ref 0–8)
ERYTHROCYTE [DISTWIDTH] IN BLOOD BY AUTOMATED COUNT: 13.4 % (ref 11.5–14.5)
EST. GFR  (NO RACE VARIABLE): >60 ML/MIN/1.73 M^2
ESTIMATED AVG GLUCOSE: 117 MG/DL (ref 68–131)
GLUCOSE SERPL-MCNC: 103 MG/DL (ref 70–110)
HBA1C MFR BLD: 5.7 % (ref 4–5.6)
HCT VFR BLD AUTO: 44.6 % (ref 40–54)
HDLC SERPL-MCNC: 50 MG/DL (ref 40–75)
HDLC SERPL: 38.5 % (ref 20–50)
HGB BLD-MCNC: 14.5 G/DL (ref 14–18)
IMM GRANULOCYTES # BLD AUTO: 0.02 K/UL (ref 0–0.04)
IMM GRANULOCYTES NFR BLD AUTO: 0.3 % (ref 0–0.5)
LDLC SERPL CALC-MCNC: 66.6 MG/DL (ref 63–159)
LYMPHOCYTES # BLD AUTO: 1.9 K/UL (ref 1–4.8)
LYMPHOCYTES NFR BLD: 26.4 % (ref 18–48)
MCH RBC QN AUTO: 28.5 PG (ref 27–31)
MCHC RBC AUTO-ENTMCNC: 32.5 G/DL (ref 32–36)
MCV RBC AUTO: 88 FL (ref 82–98)
MONOCYTES # BLD AUTO: 0.5 K/UL (ref 0.3–1)
MONOCYTES NFR BLD: 6.8 % (ref 4–15)
NEUTROPHILS # BLD AUTO: 4.6 K/UL (ref 1.8–7.7)
NEUTROPHILS NFR BLD: 63.4 % (ref 38–73)
NONHDLC SERPL-MCNC: 80 MG/DL
NRBC BLD-RTO: 0 /100 WBC
PLATELET # BLD AUTO: 205 K/UL (ref 150–450)
PMV BLD AUTO: 11.4 FL (ref 9.2–12.9)
POTASSIUM SERPL-SCNC: 4.8 MMOL/L (ref 3.5–5.1)
PROT SERPL-MCNC: 7.3 G/DL (ref 6–8.4)
RBC # BLD AUTO: 5.09 M/UL (ref 4.6–6.2)
SODIUM SERPL-SCNC: 138 MMOL/L (ref 136–145)
TRIGL SERPL-MCNC: 67 MG/DL (ref 30–150)
WBC # BLD AUTO: 7.3 K/UL (ref 3.9–12.7)

## 2024-07-10 PROCEDURE — 80053 COMPREHEN METABOLIC PANEL: CPT | Performed by: INTERNAL MEDICINE

## 2024-07-10 PROCEDURE — 80061 LIPID PANEL: CPT | Performed by: INTERNAL MEDICINE

## 2024-07-10 PROCEDURE — 85025 COMPLETE CBC W/AUTO DIFF WBC: CPT | Performed by: INTERNAL MEDICINE

## 2024-07-10 PROCEDURE — 99999 PR PBB SHADOW E&M-EST. PATIENT-LVL III: CPT | Mod: PBBFAC,,, | Performed by: PHYSICIAN ASSISTANT

## 2024-07-10 PROCEDURE — 36415 COLL VENOUS BLD VENIPUNCTURE: CPT | Performed by: INTERNAL MEDICINE

## 2024-07-10 PROCEDURE — 83036 HEMOGLOBIN GLYCOSYLATED A1C: CPT | Performed by: INTERNAL MEDICINE

## 2024-07-10 NOTE — PROGRESS NOTES
Referred by Abida Joiner NP     NEW PATIENT VISIT    Osiel Kerr  is a pleasant 64 y.o. male  with PMH significant for HTN, HLD, Afib, hx CVA, preDM, vit B6 def, vit B12 def, smoker who presents for sleep evaluation following referral from Neurology      C/o snoring, poor disrupted and usually non-refreshing sleep, difficulties with sleep onset and maintenance, and occasionally daytime sleepiness and fatigue. States occasional 10-20min cat nap during the day. Denies drowsiness when driving. Denies parasomnias. Denies dream enactment behaviors. Reports hx of Afib and recent CVA. States Neurologist recommended evaluation for SHANTA, which is why he presents today    SLEEP SCHEDULE   Environment    Bed Time 8PM   Sleep Latency 1-4hrs   Arousals 3-4   Nocturia 0   Back to sleep 1-2hrs   Wake time 4:30AM   Naps Occasional 10-20min nap   Work        Past Medical History:   Diagnosis Date    Essential (primary) hypertension     Mixed hyperlipidemia     Paroxysmal A-fib     Right-sided lacunar infarction 04/21/2024     Patient Active Problem List   Diagnosis    Essential hypertension    Palpitations    Paroxysmal atrial fibrillation    Tobacco use    Tobacco use disorder, severe, dependence    Right-sided lacunar infarction    Hyperlipidemia, mixed    Dysarthria due to acute stroke    Chronic anticoagulation    Vitamin B6 deficiency neuropathy    B12 deficiency       Current Outpatient Medications:     atorvastatin (LIPITOR) 80 MG tablet, Take 1 tablet (80 mg total) by mouth once daily., Disp: 90 tablet, Rfl: 3    ELIQUIS 5 mg Tab, Take 1 tablet (5 mg total) by mouth 2 (two) times daily., Disp: 90 tablet, Rfl: 3    lisinopriL 10 MG tablet, Take 1 tablet (10 mg total) by mouth once daily., Disp: 90 tablet, Rfl: 3    metoprolol succinate (TOPROL-XL) 25 MG 24 hr tablet, Take 1 tablet (25 mg total) by mouth once daily., Disp: 90 tablet, Rfl: 1    nicotine (NICODERM CQ) 21 mg/24 hr, Place 1 patch onto the skin once daily.  Change location daily., Disp: 14 patch, Rfl: 0     There were no vitals filed for this visit.    Physical Exam:    GEN:   Well-appearing  Psych:  Appropriate affect, demonstrates insight  SKIN:  No rash on the face or bridge of the nose      LABS:   Lab Results   Component Value Date    HGB 16.3 04/22/2024    CO2 24 04/22/2024         RECORDS REVIEWED:    No previous sleep study    ASSESSMENT    Holstein Sleepiness Scale:  Sitting and reading:   3  Watching TV:    2  Passenger in a car x 1 hr:  0  Sitting quietly after lunch:  0  Lying down to rest in PM:  1  Sitting, inactive in public:  0  Sitting+ talking to someone:  0  Stopped in traffic:   0  Total    6/24    PROBLEM DESCRIPTION/ Sx on Presentation  STATUS   Sx SHANTA   + snoring, denies witnessed apneas  + wakes feeling un-refreshed most of the time  New   Daytime Sx   + sleepiness when inactive   Occasional 10-20min nap  Denies drowsiness when driving  ESS 6/24 on intake  New   Insomnia   Trouble falling asleep: difficult  Arousals:         3-4 x nightly  Hard to get back to sleep?: often difficult    Prior pertinent medications:  Current pertinent medications:   New   Nocturia   x 0 per sleep period  New   Other issues:       PLAN      -recommend sleep testing   -PSG ordered  -discussed trial therapy if SHANTA present and the patient is open to a trial of CPAP therapy  -discussed SHANTA and PAP with patient in detail, including possible complications of untreated SHANTA like heart attack/stroke  -advised on strict driving precautions; advised never to drive drowsy     Advised on plan of care. Answered all patient questions. Patient verbalized understanding and voiced agreement with plan of care.     RTC if dx of SHANTA made and CPAP ordered, will need follow up 31-90 days after receiving machine for compliance         The patient was given open opportunity to ask questions and/or express concerns about treatment plan. All questions/concerns were discussed.     Two patient  identifiers used prior to evaluation.

## 2024-07-11 DIAGNOSIS — I48.0 PAROXYSMAL ATRIAL FIBRILLATION: Primary | ICD-10-CM

## 2024-07-12 PROBLEM — Q21.12 PFO (PATENT FORAMEN OVALE): Status: ACTIVE | Noted: 2024-07-12

## 2024-07-16 ENCOUNTER — TELEPHONE (OUTPATIENT)
Dept: ELECTROPHYSIOLOGY | Facility: CLINIC | Age: 64
End: 2024-07-16
Payer: MEDICARE

## 2024-07-22 ENCOUNTER — HOSPITAL ENCOUNTER (OUTPATIENT)
Dept: SLEEP MEDICINE | Facility: OTHER | Age: 64
Discharge: HOME | End: 2024-07-22
Attending: PHYSICIAN ASSISTANT
Payer: MEDICARE

## 2024-07-22 DIAGNOSIS — F51.09 OTHER INSOMNIA NOT DUE TO A SUBSTANCE OR KNOWN PHYSIOLOGICAL CONDITION: ICD-10-CM

## 2024-07-22 DIAGNOSIS — I63.81 LACUNAR INFARCT, ACUTE: ICD-10-CM

## 2024-07-22 DIAGNOSIS — R06.83 SNORING: ICD-10-CM

## 2024-07-22 DIAGNOSIS — R40.0 DAYTIME SLEEPINESS: ICD-10-CM

## 2024-07-22 PROBLEM — I63.9 DYSARTHRIA DUE TO ACUTE STROKE: Status: RESOLVED | Noted: 2024-04-22 | Resolved: 2024-07-22

## 2024-07-22 PROBLEM — R47.1 DYSARTHRIA DUE TO ACUTE STROKE: Status: RESOLVED | Noted: 2024-04-22 | Resolved: 2024-07-22

## 2024-07-22 PROCEDURE — 95810 POLYSOM 6/> YRS 4/> PARAM: CPT

## 2024-07-23 ENCOUNTER — PATIENT OUTREACH (OUTPATIENT)
Dept: ADMINISTRATIVE | Facility: HOSPITAL | Age: 64
End: 2024-07-23
Payer: MEDICARE

## 2024-07-23 ENCOUNTER — OFFICE VISIT (OUTPATIENT)
Dept: INTERNAL MEDICINE | Facility: CLINIC | Age: 64
End: 2024-07-23
Payer: MEDICARE

## 2024-07-23 ENCOUNTER — CLINICAL SUPPORT (OUTPATIENT)
Dept: SMOKING CESSATION | Facility: CLINIC | Age: 64
End: 2024-07-23
Payer: COMMERCIAL

## 2024-07-23 VITALS
OXYGEN SATURATION: 99 % | WEIGHT: 151.88 LBS | HEART RATE: 59 BPM | DIASTOLIC BLOOD PRESSURE: 70 MMHG | HEIGHT: 68 IN | SYSTOLIC BLOOD PRESSURE: 108 MMHG | BODY MASS INDEX: 23.02 KG/M2

## 2024-07-23 DIAGNOSIS — I10 ESSENTIAL HYPERTENSION: ICD-10-CM

## 2024-07-23 DIAGNOSIS — R73.03 PREDIABETES: ICD-10-CM

## 2024-07-23 DIAGNOSIS — Z23 NEED FOR PROPHYLACTIC VACCINATION AGAINST DIPHTHERIA AND TETANUS: ICD-10-CM

## 2024-07-23 DIAGNOSIS — Z23 NEED FOR STREPTOCOCCUS PNEUMONIAE VACCINATION: ICD-10-CM

## 2024-07-23 DIAGNOSIS — E53.8 B12 DEFICIENCY: ICD-10-CM

## 2024-07-23 DIAGNOSIS — Z79.01 CHRONIC ANTICOAGULATION: ICD-10-CM

## 2024-07-23 DIAGNOSIS — I63.81 RIGHT-SIDED LACUNAR INFARCTION: Primary | ICD-10-CM

## 2024-07-23 DIAGNOSIS — E78.2 HYPERLIPIDEMIA, MIXED: ICD-10-CM

## 2024-07-23 DIAGNOSIS — I48.0 PAROXYSMAL ATRIAL FIBRILLATION: ICD-10-CM

## 2024-07-23 DIAGNOSIS — Q21.12 PFO (PATENT FORAMEN OVALE): ICD-10-CM

## 2024-07-23 DIAGNOSIS — F17.200 NICOTINE DEPENDENCE: Primary | ICD-10-CM

## 2024-07-23 DIAGNOSIS — G63 VITAMIN B6 DEFICIENCY NEUROPATHY: ICD-10-CM

## 2024-07-23 DIAGNOSIS — Z13.6 SCREENING FOR AAA (AORTIC ABDOMINAL ANEURYSM): ICD-10-CM

## 2024-07-23 DIAGNOSIS — Z87.891 HISTORY OF TOBACCO ABUSE: ICD-10-CM

## 2024-07-23 DIAGNOSIS — E53.1 VITAMIN B6 DEFICIENCY NEUROPATHY: ICD-10-CM

## 2024-07-23 PROBLEM — R06.83 SNORING: Status: ACTIVE | Noted: 2024-07-23

## 2024-07-23 PROCEDURE — 3008F BODY MASS INDEX DOCD: CPT | Mod: CPTII,S$GLB,, | Performed by: INTERNAL MEDICINE

## 2024-07-23 PROCEDURE — G0009 ADMIN PNEUMOCOCCAL VACCINE: HCPCS | Mod: S$GLB,,, | Performed by: INTERNAL MEDICINE

## 2024-07-23 PROCEDURE — 99404 PREV MED CNSL INDIV APPRX 60: CPT | Mod: S$GLB,,,

## 2024-07-23 PROCEDURE — 90677 PCV20 VACCINE IM: CPT | Mod: S$GLB,,, | Performed by: INTERNAL MEDICINE

## 2024-07-23 PROCEDURE — 1126F AMNT PAIN NOTED NONE PRSNT: CPT | Mod: CPTII,S$GLB,, | Performed by: INTERNAL MEDICINE

## 2024-07-23 PROCEDURE — 3078F DIAST BP <80 MM HG: CPT | Mod: CPTII,S$GLB,, | Performed by: INTERNAL MEDICINE

## 2024-07-23 PROCEDURE — 1101F PT FALLS ASSESS-DOCD LE1/YR: CPT | Mod: CPTII,S$GLB,, | Performed by: INTERNAL MEDICINE

## 2024-07-23 PROCEDURE — 3074F SYST BP LT 130 MM HG: CPT | Mod: CPTII,S$GLB,, | Performed by: INTERNAL MEDICINE

## 2024-07-23 PROCEDURE — 3288F FALL RISK ASSESSMENT DOCD: CPT | Mod: CPTII,S$GLB,, | Performed by: INTERNAL MEDICINE

## 2024-07-23 PROCEDURE — 3044F HG A1C LEVEL LT 7.0%: CPT | Mod: CPTII,S$GLB,, | Performed by: INTERNAL MEDICINE

## 2024-07-23 PROCEDURE — 99999 PR PBB SHADOW E&M-EST. PATIENT-LVL II: CPT | Mod: PBBFAC,,,

## 2024-07-23 PROCEDURE — 99214 OFFICE O/P EST MOD 30 MIN: CPT | Mod: 25,S$GLB,, | Performed by: INTERNAL MEDICINE

## 2024-07-23 PROCEDURE — 99999 PR PBB SHADOW E&M-EST. PATIENT-LVL IV: CPT | Mod: PBBFAC,,, | Performed by: INTERNAL MEDICINE

## 2024-07-23 PROCEDURE — 4010F ACE/ARB THERAPY RXD/TAKEN: CPT | Mod: CPTII,S$GLB,, | Performed by: INTERNAL MEDICINE

## 2024-07-23 RX ORDER — IBUPROFEN 200 MG
1 TABLET ORAL DAILY
Qty: 14 PATCH | Refills: 0 | Status: SHIPPED | OUTPATIENT
Start: 2024-07-23

## 2024-07-23 NOTE — PROGRESS NOTES
Individual Follow-Up Form    7/23/2024    Quit Date: 05-    Clinical Status of Patient: Outpatient    Length of Service: 60 minutes    Continuing Medication: yes  Patches    Other Medications: none     Target Symptoms: Withdrawal and medication side effects. The following were  rated moderate (3) to severe (4) on TCRS:  Moderate (3): cravings-coping skills  Severe (4): none    Comments: Patient was seen in clinic today for follow up.  Patient remains tobacco free since May 03, 2024.  Patient's CO monitor was   2 ppm.  Commended patient on his accomplishment thus far.  Patient remains on the prescribed tobacco cessation medication regimen of 21 mg nicotine patch QD  without any negative side effects at this time.  Refill sent to pharmacy.  Reviewed strategies, cues, triggers, high risk situations, lapses, relapses, diet, exercise, stress, relaxation, sleep, habitual behavior and lifestyle changes. We reviewed coping with urges/cravings to smoke and motivation to stay quit.  Patient will continue bi weekly sessions.      Diagnosis: F17.200    Next Visit: 2 weeks

## 2024-07-23 NOTE — PROGRESS NOTES
Subjective:       Patient ID: Osiel Kerr is a 64 y.o. male.    Chief Complaint: Establish Care     Osiel Kerr is a 64 y.o.  male who presents for [facility]  .  Labs c/w prediabetes. Drinks a coke daily.  Has been decreasing candy and eating fruit instead.  No longer walking for exercise due to his heart issues.      Doing well. Holternmonitor and sleep study pernding.  On eloquis, not smoking,         Pt has HTN.  Counseled on low salt diet and graded exercise program. Denies CP, SOB, orthopnea or PND.  Currently treated with antihypertensives listed in med card and compliant most of the time. No side effects from medication noted by patient.       Patient has hyperlipidemia. Pt is complaint with risk reduction medication. Denies muscle cramping and weakness. Pt attempts to follow a low cholesterol diet and exercise. No CP, SOB, orthopnea or PND.       Patient Active Problem List   Diagnosis    Essential hypertension    Palpitations    Paroxysmal atrial fibrillation    Tobacco use    Tobacco use disorder, severe, dependence    Right-sided lacunar infarction    Hyperlipidemia, mixed    Chronic anticoagulation    Vitamin B6 deficiency neuropathy    B12 deficiency    PFO (patent foramen ovale)    Snoring    Prediabetes           Past Medical History:   Diagnosis Date    Essential (primary) hypertension     Mixed hyperlipidemia     Paroxysmal A-fib     Right-sided lacunar infarction 04/21/2024       Past Surgical History:   Procedure Laterality Date    FOOT SURGERY      child, due to stepping on a nail       Family History   Problem Relation Name Age of Onset    Diabetes Mellitus Mother      Lymphoma Father  62    Hypertension Sister      Hypertension Sister      Hypertension Brother      Heart attack Maternal Grandfather  53       Social History     Tobacco Use    Smoking status: Former     Current packs/day: 0.00     Average packs/day: 1 pack/day for 47.3 years (45.8 ttl pk-yrs)     Types: Cigarettes, Vaping  "with nicotine     Start date:      Quit date: 2024     Years since quittin.1    Smokeless tobacco: Never    Tobacco comments:     Smoked 4 cigarettes on 2024         Review of Systems      Objective:   Blood pressure 108/70, pulse (!) 59, height 5' 8" (1.727 m), weight 68.9 kg (151 lb 14.4 oz), SpO2 99%.     Physical Exam  Constitutional:       Appearance: Normal appearance. He is well-developed. He is not ill-appearing.   HENT:      Head: Normocephalic and atraumatic.   Eyes:      General: No scleral icterus.     Conjunctiva/sclera: Conjunctivae normal.   Cardiovascular:      Rate and Rhythm: Normal rate.      Heart sounds: Normal heart sounds. No murmur heard.     No friction rub. No gallop.   Pulmonary:      Effort: Pulmonary effort is normal.      Breath sounds: Normal breath sounds. No wheezing or rales.   Chest:      Chest wall: No tenderness.   Abdominal:      General: Bowel sounds are normal.      Palpations: Abdomen is soft.   Musculoskeletal:         General: No tenderness or signs of injury.   Skin:     General: Skin is warm and dry.   Neurological:      Mental Status: He is alert and oriented to person, place, and time. Mental status is at baseline.   Psychiatric:         Behavior: Behavior normal.         Thought Content: Thought content normal.       Prior labs reviewed    Health Maintenance         Date Due Completion Date    Pneumococcal Vaccines (Age 65+) (1 of 2 - PCV) Never done ---    TETANUS VACCINE Never done ---    Colorectal Cancer Screening Never done ---    LDCT Lung Screen Never done ---    Shingles Vaccine (1 of 2) Never done ---    RSV Vaccine (Age 60+ and Pregnant patients) (1 - 1-dose 60+ series) Never done ---    Abdominal Aortic Aneurysm Screening Never done ---    COVID-19 Vaccine (3 - -24 season) 2023 5/3/2021    Influenza Vaccine (1) 2024 ---    Hemoglobin A1c (Prediabetes) 07/10/2025 7/10/2024    High Dose Statin 2025    Lipid " Panel 07/10/2029 7/10/2024            Assessment/Plan:       1. Right-sided lacunar infarction  Seen by neurology  Reviewed plan with pt    2. Vitamin B6 deficiency neuropathy  Comments:  diarrhea with prescription dose, trial of b6 otc    3. PFO (patent foramen ovale)  No need for repair per neurology    4. Paroxysmal atrial fibrillation  Comments:  rate controlled, anticoagulated    5. Essential hypertension  Well controlled  Cont current BP medication(s) and low salt diet  Hypertension instructions:   1. Please take your BP medication at approximately the same time each day.  2. Do not skip your medication if your blood pressure is within the normal range (near or under 120/80). This means the medication is working and you should continue using it.  3. Foods and beverages high in sodium (salt) can raise your blood pressure so please avoid added salt and read labels to avoid items with high sodium including, but not limited to, canned foods, fast foods and many sodas.   4. Take your blood pressure only when you are calm, seated quietly for over 15 minutes, with your arm supported on a table at the level of your heart. Tensing the muscles in the arm, pain or any excitement (stress, fear, etc...) can cause an elevated blood pressure and is not a true reflection of your resting blood pressure.     6. Hyperlipidemia, mixed  -  well controlled  - cont current medication  - recommend low cholesterol diet and regular cardiovascular exercise to reduce risk of cardiovascular events  - repeat lipid profile and CMP annually    7. Chronic anticoagulation  Cont, stable  Discussed risks, benefits, side effects     8. History of tobacco abuse  Comments:  using patch, cont smoking cessation  Orders:  -     CT Chest Lung Screening Low Dose; Future; Expected date: 11/20/2024    9. B12 deficiency  Comments:  recommend otc b12 daily    10. Need for Streptococcus pneumoniae vaccination  -     pneumoc 20-jeri conj-dip cr(PF) (PREVNAR-20  (PF)) injection Syrg 0.5 mL    11. Screening for AAA (aortic abdominal aneurysm)  -     CV AAA Screening; Future    12. Prediabetes  Stop drinking sodas  Low glycemic index diet    13. Need for prophylactic vaccination against diphtheria and tetanus  Will do at pharmacy   Get colonoscopy reports from ej  Wants to schedule ct chest in 3-4 mo        Medication List with Changes/Refills   Current Medications    ATORVASTATIN (LIPITOR) 80 MG TABLET    Take 1 tablet (80 mg total) by mouth once daily.    ELIQUIS 5 MG TAB    Take 1 tablet (5 mg total) by mouth 2 (two) times daily.    LISINOPRIL 10 MG TABLET    Take 1 tablet (10 mg total) by mouth once daily.    METOPROLOL SUCCINATE (TOPROL-XL) 25 MG 24 HR TABLET    Take 1 tablet (25 mg total) by mouth once daily.    NICOTINE (NICODERM CQ) 21 MG/24 HR    Place 1 patch onto the skin once daily. Change location daily.       Recommend patient complete vaccinations as listed in the overdue health maintenance report. Pt may obtain vaccinations at their local pharmacy, any [facility] pharmacy or request vaccination in our clinic.  Please note that some insurances will only cover vaccination cost at specific locations.Please check with your insurance provider regarding your coverage.  Vaccines that are not covered are still recommended.      40  minutes spent in care of patient including history, physical, chart review, orders and coordination of care.     Visit today included increased complexity associated with the care of the episodic problems and addressed and managing the longitudinal care of the patient due to the serious and/or complex managed problem(s) as above.

## 2024-07-23 NOTE — Clinical Note
Patient was seen in clinic today for follow up.  Patient remains tobacco free since May 03, 2024.  Patient's CO monitor was   2 ppm.  Commended patient on his accomplishment thus far.  Patient remains on the prescribed tobacco cessation medication regimen of 21 mg nicotine patch QD  without any negative side effects at this time.  Refill sent to pharmacy.  Reviewed strategies, cues, triggers, high risk situations, lapses, relapses, diet, exercise, stress, relaxation, sleep, habitual behavior and lifestyle changes. We reviewed coping with urges/cravings to smoke and motivation to stay quit.  Patient will continue bi weekly sessions.

## 2024-07-24 ENCOUNTER — OFFICE VISIT (OUTPATIENT)
Dept: NEUROLOGY | Facility: CLINIC | Age: 64
End: 2024-07-24
Payer: MEDICARE

## 2024-07-24 VITALS
DIASTOLIC BLOOD PRESSURE: 84 MMHG | HEART RATE: 77 BPM | BODY MASS INDEX: 23.02 KG/M2 | WEIGHT: 151.88 LBS | HEIGHT: 68 IN | SYSTOLIC BLOOD PRESSURE: 139 MMHG

## 2024-07-24 DIAGNOSIS — E78.2 HYPERLIPIDEMIA, MIXED: ICD-10-CM

## 2024-07-24 DIAGNOSIS — F17.200 TOBACCO USE DISORDER, SEVERE, DEPENDENCE: ICD-10-CM

## 2024-07-24 DIAGNOSIS — R06.83 SNORING: ICD-10-CM

## 2024-07-24 DIAGNOSIS — I48.0 PAROXYSMAL ATRIAL FIBRILLATION: ICD-10-CM

## 2024-07-24 DIAGNOSIS — I63.81 RIGHT-SIDED LACUNAR INFARCTION: Primary | ICD-10-CM

## 2024-07-24 DIAGNOSIS — I10 ESSENTIAL HYPERTENSION: ICD-10-CM

## 2024-07-24 DIAGNOSIS — Z79.01 CHRONIC ANTICOAGULATION: ICD-10-CM

## 2024-07-24 DIAGNOSIS — Q21.12 PFO (PATENT FORAMEN OVALE): ICD-10-CM

## 2024-07-24 DIAGNOSIS — R73.03 PREDIABETES: ICD-10-CM

## 2024-07-24 PROCEDURE — 3008F BODY MASS INDEX DOCD: CPT | Mod: CPTII,S$GLB,, | Performed by: STUDENT IN AN ORGANIZED HEALTH CARE EDUCATION/TRAINING PROGRAM

## 2024-07-24 PROCEDURE — 4010F ACE/ARB THERAPY RXD/TAKEN: CPT | Mod: CPTII,S$GLB,, | Performed by: STUDENT IN AN ORGANIZED HEALTH CARE EDUCATION/TRAINING PROGRAM

## 2024-07-24 PROCEDURE — 1160F RVW MEDS BY RX/DR IN RCRD: CPT | Mod: CPTII,S$GLB,, | Performed by: STUDENT IN AN ORGANIZED HEALTH CARE EDUCATION/TRAINING PROGRAM

## 2024-07-24 PROCEDURE — 1126F AMNT PAIN NOTED NONE PRSNT: CPT | Mod: CPTII,S$GLB,, | Performed by: STUDENT IN AN ORGANIZED HEALTH CARE EDUCATION/TRAINING PROGRAM

## 2024-07-24 PROCEDURE — 99999 PR PBB SHADOW E&M-EST. PATIENT-LVL III: CPT | Mod: PBBFAC,,, | Performed by: STUDENT IN AN ORGANIZED HEALTH CARE EDUCATION/TRAINING PROGRAM

## 2024-07-24 PROCEDURE — 1159F MED LIST DOCD IN RCRD: CPT | Mod: CPTII,S$GLB,, | Performed by: STUDENT IN AN ORGANIZED HEALTH CARE EDUCATION/TRAINING PROGRAM

## 2024-07-24 PROCEDURE — 3288F FALL RISK ASSESSMENT DOCD: CPT | Mod: CPTII,S$GLB,, | Performed by: STUDENT IN AN ORGANIZED HEALTH CARE EDUCATION/TRAINING PROGRAM

## 2024-07-24 PROCEDURE — 3079F DIAST BP 80-89 MM HG: CPT | Mod: CPTII,S$GLB,, | Performed by: STUDENT IN AN ORGANIZED HEALTH CARE EDUCATION/TRAINING PROGRAM

## 2024-07-24 PROCEDURE — 3075F SYST BP GE 130 - 139MM HG: CPT | Mod: CPTII,S$GLB,, | Performed by: STUDENT IN AN ORGANIZED HEALTH CARE EDUCATION/TRAINING PROGRAM

## 2024-07-24 PROCEDURE — 99213 OFFICE O/P EST LOW 20 MIN: CPT | Mod: S$GLB,,, | Performed by: STUDENT IN AN ORGANIZED HEALTH CARE EDUCATION/TRAINING PROGRAM

## 2024-07-24 PROCEDURE — 1101F PT FALLS ASSESS-DOCD LE1/YR: CPT | Mod: CPTII,S$GLB,, | Performed by: STUDENT IN AN ORGANIZED HEALTH CARE EDUCATION/TRAINING PROGRAM

## 2024-07-24 PROCEDURE — 3044F HG A1C LEVEL LT 7.0%: CPT | Mod: CPTII,S$GLB,, | Performed by: STUDENT IN AN ORGANIZED HEALTH CARE EDUCATION/TRAINING PROGRAM

## 2024-07-24 NOTE — PROGRESS NOTES
Vascular Neurology Clinic  Follow up COnsult  Patient Name: Osiel Kerr  MRN: [id number]    CC: R CR infarct  Interval History:   Patient returns for follow up. Presents today without his wife.   Cards visit 7/9/24   - Eliquis for Afib on I-watch    - Card event monitor pending read    - PFO closure unnecessary, agree with that    - EP referral     - Dr. Singh on 7/29/2024   - Reports that he had episodes where he fell due to the afib    - Reports dizziness, reports near black outs of vision and is worried about these episodes occurring while he is driving   Reports Afib episode on the 20th    - Saturday    - Noted it initially, then had to go get his watch to record it    - Lasted 5 minutes or so    - Was sitting and resting when the episode began    - Resolved spontaneously    - RVR (155 HR)  Stopped smoking    - In smoking cessation program    - Patch     HPI: Osiel Kerr is a 64 y.o. R-handed male w/ PMH significant for pAfib, Tobacco abuse, HLD, HTN presenting as referral status-post hospital admission on 4/21/2024.   Presented w/ LSFD and dysarthria w/ LKW >48 hrs PTA    - OOW for TNK and no LVO on MRA   Discharged to Home w/ NIHSS of 0, mrS of 1.   - Patient does report that he still had facial droop and a slurred speech     - 1 week past the discharge speech went to normal     - 2-3 days after DC his face returned to normal   Seen cards on 6/03/2024   - Pending another ECHO and cardiac event monitor   - States that he feels when he is in Afib    - Has been diagnosed w/ Afib x 2 1/2 years    - No previous procedures    - Does monitor Afib w/ apple watch - Afib recorded 4/17- 4/21    - Mid May  - skipped 1 dose due to hard palate bleeding    - At times would skip one dose  of AC, but never more than that   Currently on Eliquis 5 mg BID and on Atorvastatin 80 mg (previously not on a statin)   Nicotine Patch currently    - May 3rd was the last cigarette as reported by the patient.      Brain Imaging  MRI Brain w/o        Right corona radiata acute lacunar type infarct.  No intracranial hemorrhage or major vascular distribution infarct.  Changes of mild chronic microvascular ischemic disease and cerebral volume loss with few remote right-sided lacunar type infarcts.     Vessel Imaging  MRA H/N  Extracranial carotid circulation: No hemodynamically significant stenosis, aneurysmal dilatation, or dissection.  Extracranial vertebral circulation: Left slightly dominant.  No hemodynamically significant stenosis, aneurysmal dilatation, or dissection.  Intracranial Arteries: No focal high-grade stenosis, occlusion, or aneurysm.  Bilateral P comms and A-comm not identified and likely markedly hypoplastic or absent.  Cardiac Evaluation  ECHO     Left Ventricle: The left ventricle is normal in size. Ventricular mass is normal. Normal wall thickness. Normal wall motion. There is normal systolic function. Ejection fraction by visual approximation is 65%. There is normal diastolic function.    Right Ventricle: Normal right ventricular cavity size. Wall thickness is normal. Right ventricle wall motion  is normal. Systolic function is normal.    Left Atrium: Agitated saline study of the atrial septum is positive, intracardiac shunt at atrial level. There is a very small (<10 bubbles) atrial shunt.    Mitral Valve: There is mild regurgitation.    Pulmonary Artery: The estimated pulmonary artery systolic pressure is 30 mmHg.    IVC/SVC: Normal venous pressure at 3 mmHg.       Relevant Lab work   Recent Labs   Lab 07/10/24  1135   Hemoglobin A1C 5.7 H   LDL Cholesterol 66.6   HDL 50   Triglycerides 67   Cholesterol 130         NIH Stroke Scale:  Interval: baseline (upon arrival/admit)  Level of Consciousness: 0 - alert  LOC Questions: 0 - answers both correctly  LOC Commands: 0 - performs both correctly  Best Gaze: 0 - normal  Visual: 0 - no visual loss  Facial Palsy: 1 - minor  Motor Left Arm: 0 - no drift  Motor Right Arm: 0 - no drift  Motor  Left Le - no drift  Motor Right Le - no drift  Limb Ataxia: 0 - absent  Sensory: 0 - normal  Best Language: 0 - no aphasia  Dysarthria: 0 - normal articulation  Extinction and Inattention: 0 - no neglect  NIH Stroke Scale Total: 1         Review of Systems:  General: No fevers, chills  Eyes: No changes in vision  ENT: No changes in hearing  Respiratory: No SOB  CV: No chest pain, palpitations  GI: No diarrhea, blood in stool  Urinary: No dysuria, hematuria  Skin: No rashes  Neurological: No weakness, confusion  Psychiatric: No auditory nor visual hallucinations      Past Medical History  Past Medical History:   Diagnosis Date    Essential (primary) hypertension     Mixed hyperlipidemia     Paroxysmal A-fib     Right-sided lacunar infarction 2024       Medications    Current Outpatient Medications:     atorvastatin (LIPITOR) 80 MG tablet, Take 1 tablet (80 mg total) by mouth once daily., Disp: 90 tablet, Rfl: 3    ELIQUIS 5 mg Tab, Take 1 tablet (5 mg total) by mouth 2 (two) times daily., Disp: 90 tablet, Rfl: 3    lisinopriL 10 MG tablet, Take 1 tablet (10 mg total) by mouth once daily., Disp: 90 tablet, Rfl: 3    metoprolol succinate (TOPROL-XL) 25 MG 24 hr tablet, Take 1 tablet (25 mg total) by mouth once daily., Disp: 90 tablet, Rfl: 1    gabapentin (NEURONTIN) 100 MG capsule, Take 1-3 capsules (100-300 mg total) by mouth nightly as needed (restless legs  (note: take 1-2 hours prior to usual onset of symptoms))., Disp: 90 capsule, Rfl: 1    nicotine (NICODERM CQ) 21 mg/24 hr, Place 1 patch onto the skin once daily. Change location daily., Disp: 14 patch, Rfl: 0  Any other notable medications as documented in HPI    Allergies  Review of patient's allergies indicates:   Allergen Reactions    Penicillin Other (See Comments)     As a child       Social History  Social History     Socioeconomic History    Marital status:     Number of children: 2    Years of education: 12    Highest education  "level: High school graduate   Occupational History    Occupation:    Tobacco Use    Smoking status: Former     Current packs/day: 0.00     Average packs/day: 1 pack/day for 47.3 years (45.8 ttl pk-yrs)     Types: Cigarettes, Vaping with nicotine     Start date:      Quit date: 2024     Years since quittin.2    Smokeless tobacco: Never    Tobacco comments:     Smoked 4 cigarettes on 2024     Social Determinants of Health     Financial Resource Strain: Low Risk  (2024)    Overall Financial Resource Strain (CARDIA)     Difficulty of Paying Living Expenses: Not hard at all   Food Insecurity: No Food Insecurity (2024)    Hunger Vital Sign     Worried About Running Out of Food in the Last Year: Never true     Ran Out of Food in the Last Year: Never true   Transportation Needs: No Transportation Needs (2024)    PRAPARE - Transportation     Lack of Transportation (Medical): No     Lack of Transportation (Non-Medical): No   Physical Activity: Insufficiently Active (2024)    Exercise Vital Sign     Days of Exercise per Week: 1 day     Minutes of Exercise per Session: 40 min   Stress: No Stress Concern Present (2024)    [employer] - Occupational Stress Questionnaire     Feeling of Stress : Not at all   Housing Stability: Low Risk  (2024)    Housing Stability Vital Sign     Unable to Pay for Housing in the Last Year: No     Homeless in the Last Year: No     Any other notable Social History as documented in HPI.    Family History  Family History   Problem Relation Name Age of Onset    Diabetes Mellitus Mother      Lymphoma Father  62    Hypertension Sister      Hypertension Sister      Hypertension Brother      Heart attack Maternal Grandfather  53     Any other notable FMH as documented in HPI.    Physical Exam  /84 (BP Location: Right arm, Patient Position: Sitting, BP Method: Medium (Automatic))   Pulse 77   Ht 5' 8" (1.727 m)   " Wt 68.9 kg (151 lb 14.4 oz)   BMI 23.10 kg/m²     General: Well-developed, well-groomed. No apparent distress  HENT: Atraumatic.    Cardiovascular: Regular rate and rhythm  Chest: No audible wheezes, stridor, ronchi appreciated.  Musculoskeletal: No peripheral edema    Neurologic Exam: The patient is awake, alert and oriented to person, place, time and situation. Attentive, vigilant during exam. Language is fluent. Naming & repetition intact, +2-step commands.  Fund of knowledge is appropriate. Well organized thoughts.      Cranial nerves:   CN II: Visual fields are full to confrontation. Pupils are 4 mm and briskly reactive to light.  CN III, IV, VI: EOMI, no nystagmus, no ptosis  CN V: Facial sensation is intact in all 3 divisions bilaterally.  CN VII: Mild L facial asymmetry noted   CN VIII: Hearing is normal bilaterally  CN IX, X: Palate elevates symmetrically. Phonation is normal.  CN XI: Head turning and shoulder shrug are intact  CN XII: Tongue is midline with normal movements and no atrophy.    Motor examination of all extremities demonstrates normal bulk and tone in all four limbs. There are no atrophy or fasciculations.      Left Right   Left Right   Deltoid 5/5 5/5  Hip Flexion 5/5 5/5   Biceps 5/5 5/5  Hip Extension 5/5 5/5   Triceps 5/5 5/5  Knee Flexion 5/5 5/5   Wrist Ext 5-/5 5/5  Knee Extension 5/5 5/5   Finger Abd 5-/5 5/5  Ankle dorsiflex 5/5 5/5       Ankle plantar flex 5/5 5/5       Sensory examination is normal light touch in BUE and BLE.  Romberg is negative.    Deep tendon reflexes  No clonus. Negative Moore's    Gait: Normal tandem, and casual gait. Heel and toe walking are normal.     Coordination: No dysmetria with finger-to-nose. Rapid alternating movements and fine finger movements are intact.     Assessment and Plan  R CR infarct, minimal residual deficits.  Patient continues w/ follow up w/ Cardiology  We discussed, again, indefinite AC w/ DOAC, even if he does get any procedures  "for his symptomatic Afib.     - Agree with continuation of AC   - Eliquis 5 mg BID is reasonable to continue, as wouldn't call this a "failure" of the drug since it doesn't eliminate the risk of stroke all together, even w/ 100% compliance   - Reasonable to consider  as the etiology for this particular stroke due to RF profile   - RF modification as below   - Do not feel that patient's event is 2/2 cryptogenic stroke, thus would avoid PFO closure at this time as unsure if the risks of procedure outweigh the benefits in someone who will be on AC indefinitely regardless and thus would have a low risk for DVT development.  - Smoking cessation, continue what you are doing.     Recommendations:   Antiplatelet/Anticoagulation: Eliquis 5 mg BID   Lipid Management: Atorvastatin 80 mg QHS (long-term goal LDL < 70).   - Monitoring for liver dysfunction and myopathy is suggested for statins. To be addressed by PCP  Diabetes: Target hemoglobin A1c <7%, measured 2X/year or quarterly if not meeting goals  Hypertension: Long term goal is normotension w/ target BP of less than 130/80 mmHg  Sleep: Denies any symptoms of SHANTA. Consider Sleep Medicine follow up in the future if suspicion for SHANTA occurs.   Smoking: Goal is smoking cessation and encouragement not to start smoking in the future.    - Smoking cessation participation encouraged.    - Patch in place  Diet: Discussed Mediterranean Diet recommendations (Adopted from Ajit et al, NEJ, 2018.)  - Eat primarily plant-based foods, such as fruits and vegetables, whole grains, legumes (beans) and nuts  - Limit refined carbohydrates (white pasta, bread, rice).  - Replace butter with healthy fats such as olive oil.  - Use herbs and spices instead of salt to flavor foods.  - Limit red meat and processed meats to no more than a few times a month.  - Avoid sugary sodas, bakery goods, and sweets.  - Eat fish and poultry at least twice a week.  - Get plenty of exercise (150 minutes " per week).    RTC in PRN    Problem List Items Addressed This Visit          Neuro    Right-sided lacunar infarction - Primary       Cardiac/Vascular    Essential hypertension    Paroxysmal atrial fibrillation    Hyperlipidemia, mixed    PFO (patent foramen ovale)       Hematology    Chronic anticoagulation       Endocrine    Prediabetes       Other    Tobacco use disorder, severe, dependence    Snoring             Hallie Howard MD  Vascular Neurology  [facility]  [address] [place] [address]

## 2024-07-25 ENCOUNTER — CLINICAL SUPPORT (OUTPATIENT)
Dept: SMOKING CESSATION | Facility: CLINIC | Age: 64
End: 2024-07-25
Payer: COMMERCIAL

## 2024-07-25 DIAGNOSIS — F17.200 NICOTINE DEPENDENCE: Primary | ICD-10-CM

## 2024-07-25 PROCEDURE — 99407 BEHAV CHNG SMOKING > 10 MIN: CPT | Mod: S$GLB,,,

## 2024-07-25 NOTE — PROGRESS NOTES
Called pt to f/u on his 3 month smoking cessation quit status. Pt stated he remains tobacco free. Congratulated him on his  hard work and success. Informed him of benefit period, phone follow ups, and contact information. Will complete smart form and will continue to follow up on quit #1 episode.

## 2024-07-25 NOTE — PROGRESS NOTES
Subjective     HPI    I had the pleasure of seeing Osiel Speed in consultation at your request for the evaluation of AF. He is a 64M with HTN, HLD, tobacco use (quit 5/2024), PAF on eliquis, and R lacunar infact (4/2024, manifesting as L facial droop and dysarthria, outside TNK window). Pt has regular episodes of palpitations, lasting up to 30 minutes.    Per pt, AF diagnosed 2.5 years ago at [facility]. Reportedly diagnosed after an episode of syncope. Pt had another episode of near syncope while driving in 11/2022. Palpitations during episode of near syncope.    Echo in 6/2024 showed EF 60-65%, small ASD with R-->L shunting. A 48 hr Holter in 5/2024 showed sinus rhythm average HR 58 bpm (range  bpm), no AF.    Cardiac event monitor pending--pt states he had an episode of palpitations lasting 5 minutes around 9 days ago.    My interpretation of today's ECG is sinus rhythm at 64 bpm.    Review of Systems   Constitutional: Negative for decreased appetite, malaise/fatigue, weight gain and weight loss.   HENT:  Negative for sore throat.    Eyes:  Negative for blurred vision.   Cardiovascular:  Positive for palpitations. Negative for chest pain, dyspnea on exertion, irregular heartbeat, leg swelling, near-syncope, orthopnea, paroxysmal nocturnal dyspnea and syncope.   Respiratory:  Negative for shortness of breath.    Skin:  Negative for rash.   Musculoskeletal:  Negative for arthritis.   Gastrointestinal:  Negative for abdominal pain.   Neurological:  Negative for focal weakness.   Psychiatric/Behavioral:  Negative for altered mental status.           Objective     Physical Exam  Vitals and nursing note reviewed.   Constitutional:       General: He is not in acute distress.     Appearance: He is well-developed.   HENT:      Head: Normocephalic and atraumatic.   Eyes:      General: No scleral icterus.     Pupils: Pupils are equal, round, and reactive to light.   Neck:      Thyroid: No thyromegaly.    Cardiovascular:      Rate and Rhythm: Regular rhythm.      Pulses: Normal pulses.      Heart sounds: Normal heart sounds. No murmur heard.     No friction rub. No gallop.   Pulmonary:      Effort: Pulmonary effort is normal.      Breath sounds: Normal breath sounds.   Abdominal:      General: Bowel sounds are normal. There is no distension.      Palpations: Abdomen is soft.      Tenderness: There is no abdominal tenderness.   Musculoskeletal:      Cervical back: Neck supple.   Skin:     General: Skin is warm and dry.      Findings: No rash.   Neurological:      Mental Status: He is alert and oriented to person, place, and time.   Psychiatric:         Behavior: Behavior normal.            Assessment and Plan     1. Paroxysmal atrial fibrillation    2. Palpitations    3. Hyperlipidemia, mixed    4. Essential hypertension    5. Chronic anticoagulation        Plan:     In summary, Osiel Kerr is a history of PAF, likely symptomatic. His [id number] Score is 4 (age, HTN, CVA) so he should continue anticoagulation indefinitely.    Plan is to review event monitor once it is returned. If negative, will implant ILR. Risks/benefits discussed and he has agreed to proceed.    Thank you for allowing me to participate in the care of this patient. Please do not hesitate to call me with any questions or concerns.

## 2024-07-29 ENCOUNTER — HOSPITAL ENCOUNTER (OUTPATIENT)
Dept: CARDIOLOGY | Facility: CLINIC | Age: 64
Discharge: HOME | End: 2024-07-29
Payer: MEDICARE

## 2024-07-29 ENCOUNTER — OFFICE VISIT (OUTPATIENT)
Dept: ELECTROPHYSIOLOGY | Facility: CLINIC | Age: 64
End: 2024-07-29
Payer: MEDICARE

## 2024-07-29 ENCOUNTER — HOSPITAL ENCOUNTER (OUTPATIENT)
Dept: RADIOLOGY | Facility: HOSPITAL | Age: 64
Discharge: HOME | End: 2024-07-29
Attending: INTERNAL MEDICINE
Payer: MEDICARE

## 2024-07-29 VITALS
SYSTOLIC BLOOD PRESSURE: 116 MMHG | HEART RATE: 59 BPM | DIASTOLIC BLOOD PRESSURE: 76 MMHG | BODY MASS INDEX: 23.36 KG/M2 | HEIGHT: 68 IN | WEIGHT: 154.13 LBS

## 2024-07-29 DIAGNOSIS — R00.2 PALPITATIONS: ICD-10-CM

## 2024-07-29 DIAGNOSIS — Z87.891 HISTORY OF TOBACCO ABUSE: ICD-10-CM

## 2024-07-29 DIAGNOSIS — Z79.01 CHRONIC ANTICOAGULATION: ICD-10-CM

## 2024-07-29 DIAGNOSIS — E78.2 HYPERLIPIDEMIA, MIXED: ICD-10-CM

## 2024-07-29 DIAGNOSIS — I48.0 PAROXYSMAL ATRIAL FIBRILLATION: ICD-10-CM

## 2024-07-29 DIAGNOSIS — I48.0 PAROXYSMAL ATRIAL FIBRILLATION: Primary | ICD-10-CM

## 2024-07-29 DIAGNOSIS — I10 ESSENTIAL HYPERTENSION: ICD-10-CM

## 2024-07-29 LAB
OHS QRS DURATION: 82 MS
OHS QTC CALCULATION: 389 MS

## 2024-07-29 PROCEDURE — 99999 PR PBB SHADOW E&M-EST. PATIENT-LVL III: CPT | Mod: PBBFAC,,, | Performed by: INTERNAL MEDICINE

## 2024-07-29 PROCEDURE — 71271 CT THORAX LUNG CANCER SCR C-: CPT | Mod: TC

## 2024-07-29 PROCEDURE — 4010F ACE/ARB THERAPY RXD/TAKEN: CPT | Mod: CPTII,S$GLB,, | Performed by: INTERNAL MEDICINE

## 2024-07-29 PROCEDURE — 71271 CT THORAX LUNG CANCER SCR C-: CPT | Mod: 26,,, | Performed by: STUDENT IN AN ORGANIZED HEALTH CARE EDUCATION/TRAINING PROGRAM

## 2024-07-29 PROCEDURE — 3078F DIAST BP <80 MM HG: CPT | Mod: CPTII,S$GLB,, | Performed by: INTERNAL MEDICINE

## 2024-07-29 PROCEDURE — 3044F HG A1C LEVEL LT 7.0%: CPT | Mod: CPTII,S$GLB,, | Performed by: INTERNAL MEDICINE

## 2024-07-29 PROCEDURE — 99205 OFFICE O/P NEW HI 60 MIN: CPT | Mod: S$GLB,,, | Performed by: INTERNAL MEDICINE

## 2024-07-29 PROCEDURE — 3288F FALL RISK ASSESSMENT DOCD: CPT | Mod: CPTII,S$GLB,, | Performed by: INTERNAL MEDICINE

## 2024-07-29 PROCEDURE — 93010 ELECTROCARDIOGRAM REPORT: CPT | Mod: S$GLB,,, | Performed by: INTERNAL MEDICINE

## 2024-07-29 PROCEDURE — 3074F SYST BP LT 130 MM HG: CPT | Mod: CPTII,S$GLB,, | Performed by: INTERNAL MEDICINE

## 2024-07-29 PROCEDURE — 1100F PTFALLS ASSESS-DOCD GE2>/YR: CPT | Mod: CPTII,S$GLB,, | Performed by: INTERNAL MEDICINE

## 2024-07-29 PROCEDURE — 93005 ELECTROCARDIOGRAM TRACING: CPT | Mod: S$GLB,,, | Performed by: INTERNAL MEDICINE

## 2024-07-29 PROCEDURE — 1126F AMNT PAIN NOTED NONE PRSNT: CPT | Mod: CPTII,S$GLB,, | Performed by: INTERNAL MEDICINE

## 2024-07-29 PROCEDURE — 3008F BODY MASS INDEX DOCD: CPT | Mod: CPTII,S$GLB,, | Performed by: INTERNAL MEDICINE

## 2024-07-29 PROCEDURE — 1159F MED LIST DOCD IN RCRD: CPT | Mod: CPTII,S$GLB,, | Performed by: INTERNAL MEDICINE

## 2024-07-30 ENCOUNTER — HOSPITAL ENCOUNTER (OUTPATIENT)
Dept: CARDIOLOGY | Facility: HOSPITAL | Age: 64
Discharge: HOME | End: 2024-07-30
Attending: INTERNAL MEDICINE
Payer: MEDICARE

## 2024-07-30 DIAGNOSIS — Z13.6 SCREENING FOR AAA (AORTIC ABDOMINAL ANEURYSM): ICD-10-CM

## 2024-07-30 DIAGNOSIS — Z00.00 ENCOUNTER FOR MEDICARE ANNUAL WELLNESS EXAM: ICD-10-CM

## 2024-07-30 LAB
ABDOMINAL IMA AP: 1.4 CM
ABDOMINAL IMA ED VEL: 0 CM/S
ABDOMINAL IMA PS VEL: 70 CM/S
ABDOMINAL IMA TRANS: 1.7 CM
ABDOMINAL INFRARENAL AORTA AP: 1.9 CM
ABDOMINAL INFRARENAL AORTA ED VEL: 0 CM/S
ABDOMINAL INFRARENAL AORTA PS VEL: 60 CM/S
ABDOMINAL INFRARENAL AORTA TRANS: 1.6 CM
ABDOMINAL JUXTARENAL AORTA AP: 2.4 CM
ABDOMINAL JUXTARENAL AORTA ED VEL: 12 CM/S
ABDOMINAL JUXTARENAL AORTA PS VEL: 49 CM/S
ABDOMINAL JUXTARENAL AORTA TRANS: 2 CM
ABDOMINAL LT COM ILIAC AP: 1.2 CM
ABDOMINAL LT COM ILIAC TRANS: 1 CM
ABDOMINAL LT COM ILIAC VEL: 120 CM/S
ABDOMINAL LT COM ILLIAC ED VEL: 0 CM/S
ABDOMINAL RT COM ILIAC AP: 1.1 CM
ABDOMINAL RT COM ILIAC TRANS: 1.2 CM
ABDOMINAL RT COM ILIAC VEL: 134 CM/S
ABDOMINAL RT COM ILLIAC ED VEL: 0 CM/S
ABDOMINAL SUPRARENAL AORTA AP: 2.2 CM
ABDOMINAL SUPRARENAL AORTA ED VEL: 13 CM/S
ABDOMINAL SUPRARENAL AORTA PS VEL: 43 CM/S
ABDOMINAL SUPRARENAL AORTA TRANS: 1.8 CM

## 2024-07-30 PROCEDURE — 93978 VASCULAR STUDY: CPT

## 2024-07-30 PROCEDURE — 93978 VASCULAR STUDY: CPT | Mod: 26,,, | Performed by: INTERNAL MEDICINE

## 2024-08-01 DIAGNOSIS — I48.0 PAROXYSMAL ATRIAL FIBRILLATION: Primary | ICD-10-CM

## 2024-08-05 PROCEDURE — 95810 POLYSOM 6/> YRS 4/> PARAM: CPT | Mod: 26,,, | Performed by: INTERNAL MEDICINE

## 2024-08-06 ENCOUNTER — CLINICAL SUPPORT (OUTPATIENT)
Dept: SMOKING CESSATION | Facility: CLINIC | Age: 64
End: 2024-08-06
Payer: COMMERCIAL

## 2024-08-06 DIAGNOSIS — F17.200 NICOTINE DEPENDENCE: Primary | ICD-10-CM

## 2024-08-06 PROCEDURE — 99999 PR PBB SHADOW E&M-EST. PATIENT-LVL II: CPT | Mod: PBBFAC,,,

## 2024-08-06 PROCEDURE — 99404 PREV MED CNSL INDIV APPRX 60: CPT | Mod: S$GLB,,,

## 2024-08-06 RX ORDER — IBUPROFEN 200 MG
1 TABLET ORAL DAILY
Qty: 14 PATCH | Refills: 0 | Status: SHIPPED | OUTPATIENT
Start: 2024-08-06

## 2024-08-07 ENCOUNTER — PATIENT MESSAGE (OUTPATIENT)
Dept: SLEEP MEDICINE | Facility: CLINIC | Age: 64
End: 2024-08-07
Payer: MEDICARE

## 2024-08-07 DIAGNOSIS — G47.61 PERIODIC LIMB MOVEMENTS OF SLEEP: Primary | ICD-10-CM

## 2024-08-07 RX ORDER — GABAPENTIN 100 MG/1
100-300 CAPSULE ORAL NIGHTLY PRN
Qty: 90 CAPSULE | Refills: 1 | Status: SHIPPED | OUTPATIENT
Start: 2024-08-07

## 2024-08-13 ENCOUNTER — TELEPHONE (OUTPATIENT)
Dept: SLEEP MEDICINE | Facility: CLINIC | Age: 64
End: 2024-08-13
Payer: MEDICARE

## 2024-08-13 NOTE — TELEPHONE ENCOUNTER
Pam to read message from provider       FW: Unread Message Notification  Received: Today  Orquidea Ivory PA-C Holmes, Aisha, MA  Good afternoon,    This patient has not yet viewed the results of his sleep study on the portal. Please call to inform him of results and recommendations. Thank you!    Orquidea          Previous Messages       ----- Message -----  From: Orquidea Ivory PA-C  Sent: 8/7/2024  To: Osiel Kerr  Subject: Unread Message Notification                      Hello,    I just wanted to let you know that I have reviewed your sleep study. It did not show any significant trouble breathing in your sleep.    The sleep study did show that you move your legs quite frequently at night while you are sleeping and that these movements often disrupt your sleep.    I recommend we trial a medication called gabapentin to see if this improves your leg movements and sleep quality.    This medication has potential to cause drowsiness, nausea, vomiting, diarrhea, and dry mouth as side effects.    Please let me know what concerns or questions you may have.    Sincerely,  Orquidea

## 2024-08-19 ENCOUNTER — TELEPHONE (OUTPATIENT)
Dept: SMOKING CESSATION | Facility: CLINIC | Age: 64
End: 2024-08-19
Payer: MEDICARE

## 2024-08-19 NOTE — TELEPHONE ENCOUNTER
Called to remind patient about appointment for August 21, 2024.  Directions were given.  Patient confirmed.

## 2024-08-21 ENCOUNTER — CLINICAL SUPPORT (OUTPATIENT)
Dept: SMOKING CESSATION | Facility: CLINIC | Age: 64
End: 2024-08-21
Payer: COMMERCIAL

## 2024-08-21 DIAGNOSIS — F17.200 NICOTINE DEPENDENCE: Primary | ICD-10-CM

## 2024-08-21 PROCEDURE — 99404 PREV MED CNSL INDIV APPRX 60: CPT | Mod: S$GLB,,,

## 2024-08-21 RX ORDER — IBUPROFEN 200 MG
1 TABLET ORAL DAILY
Qty: 14 PATCH | Refills: 0 | Status: SHIPPED | OUTPATIENT
Start: 2024-08-21

## 2024-08-21 NOTE — Clinical Note
Patient was seen in clinic today for follow up.  Patient remains tobacco free since May 03, 2024.  Commended patient on his accomplishment thus far. Patient remains on the prescribed tobacco cessation medication regimen of 21 mg nicotine patch QD  without any negative side effects at this time. Refill sent to pharmacy.  Patient reports he still experiencing strong urges. We discussed about nicotine gums/lozenges.  Patient declined.  Patient uses cinnamon candies to aid his quit. Patient was reminded of how to prepare and use strategies to maintain quit.  We reviewed lapses ,relapses, weight gain, benefits and reasons to remain quit. Patient will continue bi weekly sessions. Informed patient of benefit period. Patient would like to continue the program.

## 2024-08-21 NOTE — PROGRESS NOTES
Individual Follow-Up Form    8/21/2024    Quit Date: 05-    Clinical Status of Patient: Outpatient    Length of Service: 60 minutes    Continuing Medication: yes  Patches    Other Medications: none     Target Symptoms: Withdrawal and medication side effects. The following were  rated moderate (3) to severe (4) on TCRS:  Moderate (3): cravings-coping skills  Severe (4): none    Comments: Patient was seen in clinic today for follow up.  Patient remains tobacco free since May 03, 2024.  Commended patient on his accomplishment thus far. Patient remains on the prescribed tobacco cessation medication regimen of 21 mg nicotine patch QD  without any negative side effects at this time. Refill sent to pharmacy.  Patient reports he still experiencing strong urges. We discussed about nicotine gums/lozenges.  Patient declined.  Patient uses cinnamon candies to aid his quit. Patient was reminded of how to prepare and use strategies to maintain quit.  We reviewed lapses ,relapses, weight gain, benefits and reasons to remain quit. Patient will continue bi weekly sessions. Informed patient of benefit period.  Patient would like to continue the program.    Diagnosis: F17.200    Next Visit: 2 weeks

## 2024-08-28 ENCOUNTER — OFFICE VISIT (OUTPATIENT)
Dept: OPTOMETRY | Facility: CLINIC | Age: 64
End: 2024-08-28
Payer: MEDICARE

## 2024-08-28 DIAGNOSIS — H25.13 SENILE NUCLEAR CATARACT, BILATERAL: Primary | ICD-10-CM

## 2024-08-28 DIAGNOSIS — H52.4 BILATERAL PRESBYOPIA: ICD-10-CM

## 2024-08-28 PROCEDURE — 1159F MED LIST DOCD IN RCRD: CPT | Mod: CPTII,S$GLB,, | Performed by: OPTOMETRIST

## 2024-08-28 PROCEDURE — 3288F FALL RISK ASSESSMENT DOCD: CPT | Mod: CPTII,S$GLB,, | Performed by: OPTOMETRIST

## 2024-08-28 PROCEDURE — 4010F ACE/ARB THERAPY RXD/TAKEN: CPT | Mod: CPTII,S$GLB,, | Performed by: OPTOMETRIST

## 2024-08-28 PROCEDURE — 3044F HG A1C LEVEL LT 7.0%: CPT | Mod: CPTII,S$GLB,, | Performed by: OPTOMETRIST

## 2024-08-28 PROCEDURE — 1101F PT FALLS ASSESS-DOCD LE1/YR: CPT | Mod: CPTII,S$GLB,, | Performed by: OPTOMETRIST

## 2024-08-28 PROCEDURE — 1126F AMNT PAIN NOTED NONE PRSNT: CPT | Mod: CPTII,S$GLB,, | Performed by: OPTOMETRIST

## 2024-08-28 PROCEDURE — 99204 OFFICE O/P NEW MOD 45 MIN: CPT | Mod: S$GLB,,, | Performed by: OPTOMETRIST

## 2024-08-28 PROCEDURE — 99999 PR PBB SHADOW E&M-EST. PATIENT-LVL II: CPT | Mod: PBBFAC,,, | Performed by: OPTOMETRIST

## 2024-08-28 NOTE — PROGRESS NOTES
HPI    CC:64 yr old Male in today for Routine/No symptoms to report other than   vision changes.   EDDY:New Patient    (+) Changes in vision   (-) Pain  (-) Irritation   (-) Itching   (-) Flashes  (-) Floaters  (-) Glasses wearer  (-) CL wearer  (-) Uses eye gtts    Does patient want a refraction today?     (-) Eye injury  (-) Eye surgery   (-)POHx  (-)FOHx    (-)DM  Hemoglobin A1C       Date                     Value               Ref Range             Status                07/10/2024               5.7 (H)             4.0 - 5.6 %           Final              Comment:    ADA Screening Guidelines:  5.7-6.4%  Consistent with   prediabetes  >or=6.5%  Consistent with diabetes    High levels of fetal   hemoglobin interfere with the HbA1C  assay. Heterozygous hemoglobin   variants (HbS, HgC, etc)do  not significantly interfere with this assay.     However, presence of multiple variants may affect accuracy.         04/22/2024               5.7 (H)             4.0 - 5.6 %           Final              Comment:    ADA Screening Guidelines:  5.7-6.4%  Consistent with   prediabetes  >or=6.5%  Consistent with diabetes    High levels of fetal   hemoglobin interfere with the HbA1C  assay. Heterozygous hemoglobin   variants (HbS, HgC, etc)do  not significantly interfere with this assay.     However, presence of multiple variants may affect accuracy.    ----------         Last edited by Amaris Martinez on 8/28/2024 11:04 AM.            Assessment /Plan     For exam results, see Encounter Report.    Senile nuclear cataract, bilateral    Bilateral presbyopia      MONITOR. ED PT ON ALL EXAM FINDINGS  NO SPECS ; CONTINUE WITH OTC READERS  MILD NS OU; PRESURGICAL; MONITOR.  RTC 1 YR//PRN FOR REE./DFE

## 2024-09-03 ENCOUNTER — PATIENT MESSAGE (OUTPATIENT)
Dept: ELECTROPHYSIOLOGY | Facility: CLINIC | Age: 64
End: 2024-09-03
Payer: MEDICARE

## 2024-09-04 ENCOUNTER — CLINICAL SUPPORT (OUTPATIENT)
Dept: SMOKING CESSATION | Facility: CLINIC | Age: 64
End: 2024-09-04
Payer: COMMERCIAL

## 2024-09-04 DIAGNOSIS — F17.200 NICOTINE DEPENDENCE: Primary | ICD-10-CM

## 2024-09-04 PROCEDURE — 99404 PREV MED CNSL INDIV APPRX 60: CPT | Mod: S$GLB,,,

## 2024-09-04 PROCEDURE — 99999 PR PBB SHADOW E&M-EST. PATIENT-LVL I: CPT | Mod: PBBFAC,,,

## 2024-09-04 RX ORDER — IBUPROFEN 200 MG
1 TABLET ORAL DAILY
Qty: 14 PATCH | Refills: 0 | Status: SHIPPED | OUTPATIENT
Start: 2024-09-04

## 2024-09-04 NOTE — PROGRESS NOTES
Individual Follow-Up Form    9/4/2024    Quit Date: 05-    Clinical Status of Patient: Outpatient    Length of Service: 60 minutes    Continuing Medication: yes  Patches    Other Medications: none     Target Symptoms: Withdrawal and medication side effects. The following were  rated moderate (3) to severe (4) on TCRS:  Moderate (3): cravings-coping skills  Severe (4):     Comments: Patient was seen in clinic today for follow up.  Patient remains tobacco free since May 03, 2024.  Commended patient on his accomplishment thus far.  Patient remains on the prescribed tobacco cessation medication regimen of 21 mg nicotine patch QD  without any negative side effects at this time.  Refill sent to pharmacy.  Reviewed strategies, habitual behavior, high risks situations, understanding urges and cravings, stress and relaxation with open discussion and additional interventions. Introduced lapses, relapses, understanding them and analyzing the situation of a lapse, conflict issues that may be linked to a lapse. Patient will continue bi weekly sessions.        Diagnosis: F17.200    Next Visit: 2 weeks

## 2024-09-04 NOTE — Clinical Note
Patient was seen in clinic today for follow up.  Patient remains tobacco free since May 03, 2024.  Commended patient on his accomplishment thus far.  Patient remains on the prescribed tobacco cessation medication regimen of 21 mg nicotine patch QD  without any negative side effects at this time.  Refill sent to pharmacy.  Reviewed strategies, habitual behavior, high risks situations, understanding urges and cravings, stress and relaxation with open discussion and additional interventions. Introduced lapses, relapses, understanding them and analyzing the situation of a lapse, conflict issues that may be linked to a lapse. Patient will continue bi weekly sessions.

## 2024-09-05 ENCOUNTER — TELEPHONE (OUTPATIENT)
Dept: NEUROLOGY | Facility: HOSPITAL | Age: 64
End: 2024-09-05
Payer: MEDICARE

## 2024-09-05 NOTE — PROGRESS NOTES
Unfortunately, I have been unable to reach Mr. Kerr via phone re: Stroke Mobile enrollment. I have left VM x3.

## 2024-09-06 ENCOUNTER — HOME CARE VISIT (OUTPATIENT)
Dept: NEUROLOGY | Facility: HOSPITAL | Age: 64
End: 2024-09-06
Payer: MEDICARE

## 2024-09-18 ENCOUNTER — TELEPHONE (OUTPATIENT)
Dept: SMOKING CESSATION | Facility: CLINIC | Age: 64
End: 2024-09-18
Payer: MEDICARE

## 2024-09-18 ENCOUNTER — CLINICAL SUPPORT (OUTPATIENT)
Dept: SMOKING CESSATION | Facility: CLINIC | Age: 64
End: 2024-09-18
Payer: COMMERCIAL

## 2024-09-18 DIAGNOSIS — F17.200 NICOTINE DEPENDENCE: Primary | ICD-10-CM

## 2024-09-18 PROCEDURE — 99999 PR PBB SHADOW E&M-EST. PATIENT-LVL I: CPT | Mod: PBBFAC,,,

## 2024-09-18 PROCEDURE — 99402 PREV MED CNSL INDIV APPRX 30: CPT | Mod: S$GLB,,,

## 2024-09-18 RX ORDER — IBUPROFEN 200 MG
1 TABLET ORAL DAILY
Qty: 14 PATCH | Refills: 0 | Status: SHIPPED | OUTPATIENT
Start: 2024-09-18

## 2024-09-18 NOTE — PROGRESS NOTES
Individual Follow-Up Form    9/18/2024    Quit Date: 05-    Clinical Status of Patient: Outpatient    Length of Service: 30 minutes    Continuing Medication: yes  Patches    Other Medications: none     Target Symptoms: Withdrawal and medication side effects. The following were  rated moderate (3) to severe (4) on TCRS:  Moderate (3): Increased appetite, weight gain-healthy diet choices and stay active  Severe (4): none    Comments: Telephone visit, spoke to patient today for follow up. Telephone visit was conducted due to patient is out of town. Patient remains tobacco free since May 03, 2024.  Commended patient on his accomplishment thus far.   Patient remains on the prescribed tobacco cessation medication regimen of 21 mg nicotine patch QD  without any negative side effects at this time.  Refill sent to pharmacy.  Patient states that he still have strong urges. We reviewed coping with urges/cravings to smoke and motivation to stay quit.  We reviewed lapses ,relapses, weight gain, benefits and reasons to remain quit.                      Diagnosis: F17.200    Next Visit: 2 weeks

## 2024-09-18 NOTE — Clinical Note
Telephone visit, spoke to patient today for follow up. Telephone visit was conducted due to patient is out of town. Patient remains tobacco free since May 03, 2024.  Commended patient on his accomplishment thus far.   Patient remains on the prescribed tobacco cessation medication regimen of 21 mg nicotine patch QD  without any negative side effects at this time.  Refill sent to pharmacy.  Patient states that he still have strong urges. We reviewed coping with urges/cravings to smoke and motivation to stay quit.  We reviewed lapses ,relapses, weight gain, benefits and reasons to remain quit.

## 2024-09-18 NOTE — TELEPHONE ENCOUNTER
Patient called and states that he can not get away.  Patient would like a telephone visit instead.  Will call patient at 10 AM for telephone visit.

## 2024-10-01 NOTE — HPI
Osiel Kerr is a 64 year old male with hx of HTN, HLD, tobacco use (quit 5/2024), PAF on eliquis, and R lacunar infact (4/2024, manifesting as L facial droop and dysarthria, outside TNK window). Here today for an ILR implant with Dr. Singh.     History obtained from previous visits as well as through patient report.    Background:     Patient last saw Dr. Singh in clinic on 7/29/2024:     He was seen in consultation at your request for the evaluation of AF. He is a 64M with HTN, HLD, tobacco use (quit 5/2024), PAF on eliquis, and R lacunar infact (4/2024, manifesting as L facial droop and dysarthria, outside TNK window). Pt has regular episodes of palpitations, lasting up to 30 minutes.     Per pt, AF diagnosed 2.5 years ago at [facility]. Reportedly diagnosed after an episode of syncope. Pt had another episode of near syncope while driving in 11/2022. Palpitations during episode of near syncope.     Echo in 6/2024 showed EF 60-65%, small ASD with R-->L shunting. A 48 hr Holter in 5/2024 showed sinus rhythm average HR 58 bpm (range  bpm), no AF.     Cardiac event monitor pending--pt states he had an episode of palpitations lasting 5 minutes around 9 days ago.     ECG on 7/29/2024 sinus rhythm at 64 bpm.    In summary from clinic visit on 7/29/2024.  Osiel Kerr is a history of PAF, likely symptomatic. His [id number] Score is 4 (age, HTN, CVA) so he should continue anticoagulation indefinitely. Plan is to review event monitor once it is returned. If negative, will implant ILR. Risks/benefits discussed and he has agreed to proceed.     Event monitor from 6/25/2024-7/25/2024 showed there were 8 patient triggered events (no sx specified) and 7 auto triggered events over the 30 day monitoring time. Corresponding strips show sinus rhythm  bpm. Periods of SVT (indeterminate duration) and atrial flutter with RVR (indeterminate duration) are seen.     Osiel Kerr presents today to [facility] for  scheduled ILR implant with Dr. Singh. He denies any chest pain, SOB, JOHNSON, dizziness, light headedness, weakness, syncope, or near syncopal episodes.He endorses episode of palpitations this AM. Patient with apple watch, his showed heart rate as high at 171bpm. Episode lasted about 15-20 minutes and stopped spontaneously. He denies any bleeding, infections, fevers, rashes, or surgeries in the past 30 days. He is currently taking Eliquis, metoprolol and lisinopril. His last dose of Eliquis was on 9/29/2024 PM.     ECG today shows sinus rhythm at 67 bpm  ms QRS 86 ms QT/Qtc 392/414 ms.    Plan:   -ILR implant  -Local anesthetic     Dr. Singh at bedside. Discussed with the patient the risks, benefits, and alternatives of ILR implant. Our discussion of risks included (but was not limited to) the possibility of pain, infection, bleeding, rare risk of device erosion if left in, and death. All questions were answered. Patient verbalized understanding and wishes to proceed. Patient would like to proceed with just local anesthetic, and deferred conscious sedation. Consents signed.

## 2024-10-02 ENCOUNTER — CLINICAL SUPPORT (OUTPATIENT)
Dept: SMOKING CESSATION | Facility: CLINIC | Age: 64
End: 2024-10-02
Payer: COMMERCIAL

## 2024-10-02 DIAGNOSIS — F17.200 NICOTINE DEPENDENCE: Primary | ICD-10-CM

## 2024-10-02 PROCEDURE — 99999 PR PBB SHADOW E&M-EST. PATIENT-LVL I: CPT | Mod: PBBFAC,,,

## 2024-10-02 PROCEDURE — 99404 PREV MED CNSL INDIV APPRX 60: CPT | Mod: S$GLB,,,

## 2024-10-02 RX ORDER — IBUPROFEN 200 MG
1 TABLET ORAL DAILY
Qty: 28 PATCH | Refills: 0 | Status: SHIPPED | OUTPATIENT
Start: 2024-10-02

## 2024-10-02 NOTE — Clinical Note
Patient was seen in clinic today for follow up.  Patient remains tobacco free since May 03, 2024.   Commended patient on his accomplishment thus far. Patient remains on the prescribed tobacco cessation medication regimen of 21 mg nicotine patch QD  without any negative side effects at this time. Refill sent to pharmacy.  Patient states that he still experiencing strong urges.  We reviewed coping with urges/cravings to smoke and motivation to stay quit. Patient states that when  he stayed  busy and he did not think about smoking. Reviewed strategies, cues, triggers, high risk situations, lapses, relapses, diet, exercise, stress, relaxation, sleep, habitual behavior and lifestyle changes. Patient will continue sessions.

## 2024-10-03 ENCOUNTER — HOSPITAL ENCOUNTER (OUTPATIENT)
Facility: HOSPITAL | Age: 64
Discharge: HOME | End: 2024-10-03
Attending: INTERNAL MEDICINE | Admitting: INTERNAL MEDICINE
Payer: MEDICARE

## 2024-10-03 VITALS
WEIGHT: 157 LBS | OXYGEN SATURATION: 98 % | TEMPERATURE: 98 F | RESPIRATION RATE: 16 BRPM | DIASTOLIC BLOOD PRESSURE: 74 MMHG | HEART RATE: 60 BPM | HEIGHT: 68 IN | SYSTOLIC BLOOD PRESSURE: 126 MMHG | BODY MASS INDEX: 23.79 KG/M2

## 2024-10-03 DIAGNOSIS — Z95.9 CARDIAC DEVICE IN SITU: ICD-10-CM

## 2024-10-03 DIAGNOSIS — I48.0 PAROXYSMAL ATRIAL FIBRILLATION: ICD-10-CM

## 2024-10-03 LAB
OHS QRS DURATION: 86 MS
OHS QTC CALCULATION: 414 MS

## 2024-10-03 PROCEDURE — 25000003 PHARM REV CODE 250: Performed by: NURSE PRACTITIONER

## 2024-10-03 PROCEDURE — 33285 INSJ SUBQ CAR RHYTHM MNTR: CPT | Performed by: INTERNAL MEDICINE

## 2024-10-03 PROCEDURE — 93010 ELECTROCARDIOGRAM REPORT: CPT | Mod: ,,, | Performed by: INTERNAL MEDICINE

## 2024-10-03 PROCEDURE — 93005 ELECTROCARDIOGRAM TRACING: CPT

## 2024-10-03 PROCEDURE — 63600175 PHARM REV CODE 636 W HCPCS: Performed by: INTERNAL MEDICINE

## 2024-10-03 PROCEDURE — C1764 EVENT RECORDER, CARDIAC: HCPCS | Performed by: INTERNAL MEDICINE

## 2024-10-03 PROCEDURE — 33285 INSJ SUBQ CAR RHYTHM MNTR: CPT | Mod: ,,, | Performed by: INTERNAL MEDICINE

## 2024-10-03 PROCEDURE — 63600175 PHARM REV CODE 636 W HCPCS: Performed by: NURSE PRACTITIONER

## 2024-10-03 DEVICE — INSERTABLE CARDIAC MONITOR
Type: IMPLANTABLE DEVICE | Site: CHEST | Status: FUNCTIONAL
Brand: LUX-DX II+™

## 2024-10-03 RX ORDER — LIDOCAINE HYDROCHLORIDE AND EPINEPHRINE 10; 10 MG/ML; UG/ML
INJECTION, SOLUTION INFILTRATION; PERINEURAL
Status: DISCONTINUED | OUTPATIENT
Start: 2024-10-03 | End: 2024-10-03 | Stop reason: HOSPADM

## 2024-10-03 RX ORDER — APIXABAN 5 MG/1
5 TABLET, FILM COATED ORAL 2 TIMES DAILY
Start: 2024-10-07

## 2024-10-03 RX ADMIN — VANCOMYCIN HYDROCHLORIDE 1000 MG: 1 INJECTION, POWDER, LYOPHILIZED, FOR SOLUTION INTRAVENOUS at 12:10

## 2024-10-03 NOTE — DISCHARGE INSTRUCTIONS
"Medications:  -HOLD your Eliquis 3 days post procedure, then RESUME on 10/7/2024  -Continue all of your other home medications. No medication changes.    New Medications:  None.    Diet  -You may resume oral intake after you are discharged, as long you have no swallowing difficulties.    Activity:  -As tolerated.    Other Precautions:  -Avoid getting the area wet for about 5 days. You may shower in 24 hours. Do not let beam of shower hit site directly and no scrubbing in area. Do not submerge incision site in water for 2 weeks.    -You can remove the outside bandage in 24 hours. You should not remove the purple skin glue that covers your wound. This will come off on its own.  -Every day, take your temperature and check your incision for signs of infection (redness, swelling, drainage, or warmth) for the next 7 days. It is normal to have some pain around the site and some bruising. However constant pain, severe tenderness and pus/drainage coming from your wound is not normal and you should call your physician immediately or seek attention at the ER. Fevers and chills and feeling ill is not normal and you should seek immediate medical attention.  -Learn to take your own pulse. Keep a record of your results. Ask your doctor what pulse rate means you should call for  medical attention.  -Carry an ID card that contains information about your loop recorder. You can show this card if your  loop recorder sets  off a metal detector. You should also show it to avoid screening with a hand-held security wand.  -Keep your cell phone away from your loop recorder. Don't carry the phone in your shirt pocket, even when it's turned off.  -Avoid strong electrical fields. Examples are those made by radio transmitting towers, "ham" radios, and heavy-duty  electrical equipment.  -Avoid leaning over the open hunt of a running car. A running engine creates an electrical field.    Follow-Up:  -Follow up for wound check in 1 week. If unable " to make appt - Device clinic advise to send a picture of his wound that day via his ([email or web address]). Call if any issues with that.  [phone] option 4.   -Follow up with Dr. Singh in 3 months.  -Make regular follow-up appointments with your doctor. They will check the loop recorder to make sure it's working  properly.    When to Call Your Doctor:  Call your doctor immediately if you have any of the following:  -Dizziness  -Chest pain  -Bleeding  -Weakness or numbness  -Visual, gait or speech disturbance  -Lack of energy  -Fainting spells  -Twitching chest muscles  -Rapid pulse or pounding heartbeat  -Shortness of breath  -Pain around your loop recorder  -Fever above 100.4°F or other signs of infection (redness, swelling, drainage, or warmth at the incision site)

## 2024-10-03 NOTE — PROGRESS NOTES
Pt DC'd per MD order. Discharge instructions given including activity, wound care, S&S of infections, future appointments, and when to call MD. Medications reviewed including when to take next dose. Telemetry and PIV DC'd, catheter tip intact. Pt declined transport and ambulated off unit.

## 2024-10-03 NOTE — Clinical Note
An incision was made     at the fourth left intercostal interspace.
ID band present and verified. Family is in the lobby.
R wave 0.62
The loop recorder was inserted.
The physician was paged.
The procedural consent was signed. A history and physical note was completed in the chart.
The skin at the left upper chest was closed with dermal adhesive.
30.9

## 2024-10-03 NOTE — H&P
[facility] - Short Stay Cardiac Unit  Cardiac Electrophysiology  History and Physical     Admission Date: 10/3/2024  Code Status: Prior   Attending Provider: Jose Singh MD   Principal Problem:Paroxysmal atrial fibrillation    Subjective:     Chief Complaint:  Paroxysmal atrial fibrillation,      HPI:  Osiel Kerr is a 64 year old male with hx of HTN, HLD, tobacco use (quit 5/2024), PAF on eliquis, and R lacunar infact (4/2024, manifesting as L facial droop and dysarthria, outside TNK window). Here today for an ILR implant with Dr. Singh.     History obtained from previous visits as well as through patient report.    Background:     Patient last saw Dr. Singh in clinic on 7/29/2024:     He was seen in consultation at your request for the evaluation of AF. He is a 64M with HTN, HLD, tobacco use (quit 5/2024), PAF on eliquis, and R lacunar infact (4/2024, manifesting as L facial droop and dysarthria, outside TNK window). Pt has regular episodes of palpitations, lasting up to 30 minutes.     Per pt, AF diagnosed 2.5 years ago at [facility]. Reportedly diagnosed after an episode of syncope. Pt had another episode of near syncope while driving in 11/2022. Palpitations during episode of near syncope.     Echo in 6/2024 showed EF 60-65%, small ASD with R-->L shunting. A 48 hr Holter in 5/2024 showed sinus rhythm average HR 58 bpm (range  bpm), no AF.     Cardiac event monitor pending--pt states he had an episode of palpitations lasting 5 minutes around 9 days ago.     ECG on 7/29/2024 sinus rhythm at 64 bpm.    In summary from clinic visit on 7/29/2024.  Osiel Kerr is a history of PAF, likely symptomatic. His [id number] Score is 4 (age, HTN, CVA) so he should continue anticoagulation indefinitely. Plan is to review event monitor once it is returned. If negative, will implant ILR. Risks/benefits discussed and he has agreed to proceed.     Event monitor from 6/25/2024-7/25/2024 showed there were 8  patient triggered events (no sx specified) and 7 auto triggered events over the 30 day monitoring time. Corresponding strips show sinus rhythm  bpm. Periods of SVT (indeterminate duration) and atrial flutter with RVR (indeterminate duration) are seen.     Osiel Kerr presents today to [facility] for scheduled ILR implant with Dr. Singh. He denies any chest pain, SOB, JOHNSON, dizziness, light headedness, weakness, syncope, or near syncopal episodes.He endorses episode of palpitations this AM. Patient with apple watch, his showed heart rate as high at 171bpm. Episode lasted about 15-20 minutes and stopped spontaneously. He denies any bleeding, infections, fevers, rashes, or surgeries in the past 30 days. He is currently taking Eliquis, metoprolol and lisinopril. His last dose of Eliquis was on 9/29/2024 PM.     ECG today shows sinus rhythm at 67 bpm  ms QRS 86 ms QT/Qtc 392/414 ms.      Past Medical History:   Diagnosis Date    Essential (primary) hypertension     Mixed hyperlipidemia     Paroxysmal A-fib     Right-sided lacunar infarction 04/21/2024       Past Surgical History:   Procedure Laterality Date    FOOT SURGERY      child, due to stepping on a nail       Review of patient's allergies indicates:   Allergen Reactions    Penicillin Other (See Comments)     As a child       No current facility-administered medications on file prior to encounter.     Current Outpatient Medications on File Prior to Encounter   Medication Sig    metoprolol succinate (TOPROL-XL) 25 MG 24 hr tablet Take 1 tablet (25 mg total) by mouth once daily.    atorvastatin (LIPITOR) 80 MG tablet Take 1 tablet (80 mg total) by mouth once daily.    ELIQUIS 5 mg Tab Take 1 tablet (5 mg total) by mouth 2 (two) times daily.    lisinopriL 10 MG tablet Take 1 tablet (10 mg total) by mouth once daily.     Family History       Problem Relation (Age of Onset)    Diabetes Mellitus Mother    Heart attack Maternal Grandfather (53)    Hypertension  "Sister, Sister, Brother    Lymphoma Father (62)          Tobacco Use    Smoking status: Former     Current packs/day: 0.00     Average packs/day: 1 pack/day for 47.3 years (45.8 ttl pk-yrs)     Types: Cigarettes, Vaping with nicotine     Start date:      Quit date: 2024     Years since quittin.3    Smokeless tobacco: Never    Tobacco comments:     Smoked 4 cigarettes on 2024   Substance and Sexual Activity    Alcohol use: Not on file    Drug use: Not on file    Sexual activity: Not on file     Review of Systems   Constitutional: Negative for chills, fever and malaise/fatigue.   HENT:  Negative for nosebleeds.    Eyes:  Negative for blurred vision.   Cardiovascular:  Positive for palpitations. Negative for chest pain, dyspnea on exertion, leg swelling, near-syncope and syncope.   Respiratory:  Negative for cough and shortness of breath.    Endocrine: Negative.    Skin:  Negative for rash.   Musculoskeletal: Negative.    Gastrointestinal:  Negative for abdominal pain and nausea.   Genitourinary:  Negative for hematuria.   Neurological:  Negative for dizziness, focal weakness, light-headedness and weakness.   Psychiatric/Behavioral:  Negative for altered mental status.    All other systems reviewed and are negative.    Objective:     Vital Signs (Most Recent):  Vitals:    10/03/24 1204 10/03/24 1205   BP: 128/82 132/76   BP Location: Right arm Left arm   Patient Position: Lying Lying   Pulse: 66    Resp: 16    Temp: 98.1 °F (36.7 °C)    TempSrc: Temporal    SpO2: 98%    Weight: 71.2 kg (157 lb)    Height: 5' 8" (1.727 m)     Vital Signs (24h Range):          There is no height or weight on file to calculate BMI.     Physical Exam  Vitals and nursing note reviewed.   Constitutional:       General: He is not in acute distress.     Appearance: Normal appearance.   HENT:      Head: Normocephalic and atraumatic.      Mouth/Throat:      Mouth: Mucous membranes are moist.   Eyes:      Extraocular Movements: " Extraocular movements intact.   Cardiovascular:      Rate and Rhythm: Normal rate and regular rhythm.      Pulses:           Radial pulses are 2+ on the right side and 2+ on the left side.   Pulmonary:      Effort: Pulmonary effort is normal. No respiratory distress.      Breath sounds: Normal breath sounds. No wheezing.   Abdominal:      General: There is no distension.      Palpations: Abdomen is soft.      Tenderness: There is no abdominal tenderness.   Musculoskeletal:         General: Normal range of motion.      Cervical back: Normal range of motion and neck supple.   Skin:     General: Skin is warm and dry.   Neurological:      General: No focal deficit present.      Mental Status: He is alert and oriented to person, place, and time.   Psychiatric:         Mood and Affect: Mood normal.     Significant Labs: NA    Significant Imaging: ECG  Assessment and Plan:     Plan:   -ILR implant  -Local anesthetic     Dr. Singh at bedside. Discussed with the patient the risks, benefits, and alternatives of ILR implant. Our discussion of risks included (but was not limited to) the possibility of pain, infection, bleeding, rare risk of device erosion if left in, and death. All questions were answered. Patient verbalized understanding and wishes to proceed. Patient would like to proceed with just local anesthetic, and deferred conscious sedation. Consents signed.    L[facility] Vela-C  Cardiac Electrophysiology  [facility] - Short Stay Cardiac Unit    Attending: Dr. Jose Singh

## 2024-10-03 NOTE — DISCHARGE SUMMARY
[facility] - Short Stay Cardiac Unit  Cardiac Electrophysiology  Discharge Summary      Patient Name: Osiel Kerr  MRN: [id number]  Admission Date: 10/3/2024  Hospital Length of Stay: 0 days  Discharge Date and Time: 10/3/2024  2:06 PM  Attending Physician: Jose Singh MD    Discharging Provider: KELLEY Vela  Primary Care Physician: Sienna Perry MD    HPI:   Osiel Kerr is a 64 year old male with hx of HTN, HLD, tobacco use (quit 5/2024), PAF on eliquis, and R lacunar infact (4/2024, manifesting as L facial droop and dysarthria, outside TNK window). Here today for an ILR implant with Dr. Singh.     History obtained from previous visits as well as through patient report.    Background:     Patient last saw Dr. Singh in clinic on 7/29/2024:     He was seen in consultation at your request for the evaluation of AF. He is a 64M with HTN, HLD, tobacco use (quit 5/2024), PAF on eliquis, and R lacunar infact (4/2024, manifesting as L facial droop and dysarthria, outside TNK window). Pt has regular episodes of palpitations, lasting up to 30 minutes.     Per pt, AF diagnosed 2.5 years ago at [facility]. Reportedly diagnosed after an episode of syncope. Pt had another episode of near syncope while driving in 11/2022. Palpitations during episode of near syncope.     Echo in 6/2024 showed EF 60-65%, small ASD with R-->L shunting. A 48 hr Holter in 5/2024 showed sinus rhythm average HR 58 bpm (range  bpm), no AF.     Cardiac event monitor pending--pt states he had an episode of palpitations lasting 5 minutes around 9 days ago.     ECG on 7/29/2024 sinus rhythm at 64 bpm.    In summary from clinic visit on 7/29/2024.  Osiel Kerr is a history of PAF, likely symptomatic. His [id number]-OADq Score is 4 (age, HTN, CVA) so he should continue anticoagulation indefinitely. Plan is to review event monitor once it is returned. If negative, will implant ILR. Risks/benefits discussed and he has agreed to proceed.      Event monitor from 6/25/2024-7/25/2024 showed there were 8 patient triggered events (no sx specified) and 7 auto triggered events over the 30 day monitoring time. Corresponding strips show sinus rhythm  bpm. Periods of SVT (indeterminate duration) and atrial flutter with RVR (indeterminate duration) are seen.     Osiel Kerr presents today to [facility] for scheduled ILR implant with Dr. Singh. He denies any chest pain, SOB, JOHNSON, dizziness, light headedness, weakness, syncope, or near syncopal episodes.He endorses episode of palpitations this AM. Patient with apple watch, his showed heart rate as high at 171bpm. Episode lasted about 15-20 minutes and stopped spontaneously. He denies any bleeding, infections, fevers, rashes, or surgeries in the past 30 days. He is currently taking Eliquis, metoprolol and lisinopril. His last dose of Eliquis was on 9/29/2024 PM.     ECG today shows sinus rhythm at 67 bpm  ms QRS 86 ms QT/Qtc 392/414 ms.    Procedure(s) (LRB):  Insertion, Implantable Loop Recorder (N/A)     Indwelling Lines/Drains at time of discharge:  None     Hospital Course:  Patient underwent ILR implant, tolerated procedure well with no acute complications noted. Left chest site with Mepilex foam dressing, C/D/I with no bleeding, drainage, hematoma, or pain to site. VSS. Patient to follow up with device clinic in 1 week for wound check. Device clinic advises if unable to make appt, send picture of site on day of appt to [facility]. Call device clinic if any issue. Follow up in 3 months with Dr. Singh. Continue all home medications. Discharge plans/instructions discussed with patient who verbalized understanding and agreement of plans of care. No further questions or concerns voiced at this time. Patient discharged home in stable condition.     Goals of Care Treatment Preferences:  Code Status: Full Code      Discharged Condition: good    Disposition: Home or Self Care    Follow Up:    Patient Instructions:  "     Lifting restrictions     Other restrictions (specify):   Order Comments: Medications:  -HOLD your Eliquis 3 days post procedure, then RESUME on 10/7/2024  -Continue all of your other home medications. No medication changes.    New Medications:  None.    Diet  -You may resume oral intake after you are discharged, as long you have no swallowing difficulties.    Activity:  -As tolerated.    Other Precautions:  -Avoid getting the area wet for about 5 days. You may shower in 24 hours. Do not let beam of shower hit site directly and no scrubbing in area. Do not submerge incision site in water for 2 weeks.    -You can remove the outside bandage in 24 hours. You should not remove the purple skin glue that covers your wound. This will come off on its own.  -Every day, take your temperature and check your incision for signs of infection (redness, swelling, drainage, or warmth) for the next 7 days. It is normal to have some pain around the site and some bruising. However constant pain, severe tenderness and pus/drainage coming from your wound is not normal and you should call your physician immediately or seek attention at the ER. Fevers and chills and feeling ill is not normal and you should seek immediate medical attention.  -Learn to take your own pulse. Keep a record of your results. Ask your doctor what pulse rate means you should call for  medical attention.  -Carry an ID card that contains information about your loop recorder. You can show this card if your  loop recorder sets  off a metal detector. You should also show it to avoid screening with a hand-held security wand.  -Keep your cell phone away from your loop recorder. Don't carry the phone in your shirt pocket, even when it's turned off.  -Avoid strong electrical fields. Examples are those made by radio transmitting towers, "ham" radios, and heavy-duty  electrical equipment.  -Avoid leaning over the open hunt of a running car. A running engine creates an " electrical field.    Follow-Up:  -Follow up for wound check in 1 week. If unable to make appt - Device clinic advise to send a picture of his wound that day via his ([facility]). Call if any issues with that.  [phone] option 4.   -Follow up with Dr. Singh in 3 months.  -Make regular follow-up appointments with your doctor. They will check the loop recorder to make sure it's working  properly.    When to Call Your Doctor:  Call your doctor immediately if you have any of the following:  -Dizziness  -Chest pain  -Bleeding  -Weakness or numbness  -Visual, gait or speech disturbance  -Lack of energy  -Fainting spells  -Twitching chest muscles  -Rapid pulse or pounding heartbeat  -Shortness of breath  -Pain around your loop recorder  -Fever above 100.4°F or other signs of infection (redness, swelling, drainage, or warmth at the incision site)     Notify your health care provider if you experience any of the following:  temperature >100.4     Notify your health care provider if you experience any of the following:  persistent nausea and vomiting or diarrhea     Notify your health care provider if you experience any of the following:  severe uncontrolled pain     Notify your health care provider if you experience any of the following:  redness, tenderness, or signs of infection (pain, swelling, redness, odor or green/yellow discharge around incision site)     Notify your health care provider if you experience any of the following:  difficulty breathing or increased cough     Notify your health care provider if you experience any of the following:  severe persistent headache     Notify your health care provider if you experience any of the following:  worsening rash     Notify your health care provider if you experience any of the following:  persistent dizziness, light-headedness, or visual disturbances     Notify your health care provider if you experience any of the following:  increased confusion or weakness      Remove dressing in 24 hours     Shower on day dressing removed (No bath)     Medications:  Reconciled Home Medications:      Medication List        CHANGE how you take these medications      ELIQUIS 5 mg Tab  Generic drug: apixaban  Take 1 tablet (5 mg total) by mouth 2 (two) times daily.  Start taking on: October 7, 2024  What changed: These instructions start on October 7, 2024. If you are unsure what to do until then, ask your doctor or other care provider.            CONTINUE taking these medications      atorvastatin 80 MG tablet  Commonly known as: LIPITOR  Take 1 tablet (80 mg total) by mouth once daily.     gabapentin 100 MG capsule  Commonly known as: NEURONTIN  Take 1-3 capsules (100-300 mg total) by mouth nightly as needed (restless legs  (note: take 1-2 hours prior to usual onset of symptoms)).     lisinopriL 10 MG tablet  Take 1 tablet (10 mg total) by mouth once daily.     metoprolol succinate 25 MG 24 hr tablet  Commonly known as: TOPROL-XL  Take 1 tablet (25 mg total) by mouth once daily.     nicotine 21 mg/24 hr  Commonly known as: NICODERM CQ  Place 1 patch onto the skin once daily. Change location daily.            Plan:   -Continue home medications.   -Hold Eliquis for 3 days and then resume.   -Follow up with device clinic in one week for wound check.   -Follow up with Dr. Singh in 3 months.     Time spent on the discharge of patient: 25 minutes    KELLEY Vela  Cardiac Electrophysiology  [facility] - Short Stay Cardiac Unit    Attending: Dr. Jose Singh

## 2024-10-03 NOTE — PLAN OF CARE
Received report from [facility]. Patient s/p loop recorder, AAOx3. VSS, no c/o pain or discomfort at this time, resp even and unlabored. L chest wall incision JAMIE c DB. No active bleeding. No hematoma noted. Post procedure protocol reviewed with patient. Understanding verbalized. Nurse call bell within reach.

## 2024-10-03 NOTE — SUBJECTIVE & OBJECTIVE
Past Medical History:   Diagnosis Date    Essential (primary) hypertension     Mixed hyperlipidemia     Paroxysmal A-fib     Right-sided lacunar infarction 2024       Past Surgical History:   Procedure Laterality Date    FOOT SURGERY      child, due to stepping on a nail       Review of patient's allergies indicates:   Allergen Reactions    Penicillin Other (See Comments)     As a child       No current facility-administered medications on file prior to encounter.     Current Outpatient Medications on File Prior to Encounter   Medication Sig    metoprolol succinate (TOPROL-XL) 25 MG 24 hr tablet Take 1 tablet (25 mg total) by mouth once daily.    atorvastatin (LIPITOR) 80 MG tablet Take 1 tablet (80 mg total) by mouth once daily.    ELIQUIS 5 mg Tab Take 1 tablet (5 mg total) by mouth 2 (two) times daily.    lisinopriL 10 MG tablet Take 1 tablet (10 mg total) by mouth once daily.     Family History       Problem Relation (Age of Onset)    Diabetes Mellitus Mother    Heart attack Maternal Grandfather (53)    Hypertension Sister, Sister, Brother    Lymphoma Father (62)          Tobacco Use    Smoking status: Former     Current packs/day: 0.00     Average packs/day: 1 pack/day for 47.3 years (45.8 ttl pk-yrs)     Types: Cigarettes, Vaping with nicotine     Start date:      Quit date: 2024     Years since quittin.3    Smokeless tobacco: Never    Tobacco comments:     Smoked 4 cigarettes on 2024   Substance and Sexual Activity    Alcohol use: Not on file    Drug use: Not on file    Sexual activity: Not on file     Review of Systems   Constitutional: Negative for chills, fever and malaise/fatigue.   HENT:  Negative for nosebleeds.    Eyes:  Negative for blurred vision.   Cardiovascular:  Positive for palpitations. Negative for chest pain, dyspnea on exertion, leg swelling, near-syncope and syncope.   Respiratory:  Negative for cough and shortness of breath.    Endocrine: Negative.    Skin:   Negative for rash.   Musculoskeletal: Negative.    Gastrointestinal:  Negative for abdominal pain and nausea.   Genitourinary:  Negative for hematuria.   Neurological:  Negative for dizziness, focal weakness, light-headedness and weakness.   Psychiatric/Behavioral:  Negative for altered mental status.    All other systems reviewed and are negative.    Objective:     Vital Signs (Most Recent):    Vital Signs (24h Range):             There is no height or weight on file to calculate BMI.             Physical Exam  Vitals and nursing note reviewed.   Constitutional:       General: He is not in acute distress.     Appearance: Normal appearance.   HENT:      Head: Normocephalic and atraumatic.      Mouth/Throat:      Mouth: Mucous membranes are moist.   Eyes:      Extraocular Movements: Extraocular movements intact.   Cardiovascular:      Rate and Rhythm: Normal rate and regular rhythm.      Pulses:           Radial pulses are 2+ on the right side and 2+ on the left side.   Pulmonary:      Effort: Pulmonary effort is normal. No respiratory distress.      Breath sounds: Normal breath sounds. No wheezing.   Abdominal:      General: There is no distension.      Palpations: Abdomen is soft.      Tenderness: There is no abdominal tenderness.   Musculoskeletal:         General: Normal range of motion.      Cervical back: Normal range of motion and neck supple.   Skin:     General: Skin is warm and dry.   Neurological:      General: No focal deficit present.      Mental Status: He is alert and oriented to person, place, and time.   Psychiatric:         Mood and Affect: Mood normal.            Significant Labs: NA    Significant Imaging: ECG

## 2024-10-09 ENCOUNTER — OFFICE VISIT (OUTPATIENT)
Dept: CARDIOLOGY | Facility: CLINIC | Age: 64
End: 2024-10-09
Payer: MEDICARE

## 2024-10-09 VITALS
HEART RATE: 67 BPM | WEIGHT: 148.56 LBS | HEIGHT: 68 IN | DIASTOLIC BLOOD PRESSURE: 84 MMHG | SYSTOLIC BLOOD PRESSURE: 119 MMHG | BODY MASS INDEX: 22.52 KG/M2

## 2024-10-09 DIAGNOSIS — I10 ESSENTIAL HYPERTENSION: Primary | ICD-10-CM

## 2024-10-09 DIAGNOSIS — I48.0 PAROXYSMAL ATRIAL FIBRILLATION: ICD-10-CM

## 2024-10-09 DIAGNOSIS — E78.2 HYPERLIPIDEMIA, MIXED: ICD-10-CM

## 2024-10-09 PROCEDURE — 99999 PR PBB SHADOW E&M-EST. PATIENT-LVL III: CPT | Mod: PBBFAC,GC,, | Performed by: STUDENT IN AN ORGANIZED HEALTH CARE EDUCATION/TRAINING PROGRAM

## 2024-10-09 PROCEDURE — 3079F DIAST BP 80-89 MM HG: CPT | Mod: CPTII,GC,S$GLB, | Performed by: STUDENT IN AN ORGANIZED HEALTH CARE EDUCATION/TRAINING PROGRAM

## 2024-10-09 PROCEDURE — 3008F BODY MASS INDEX DOCD: CPT | Mod: CPTII,GC,S$GLB, | Performed by: STUDENT IN AN ORGANIZED HEALTH CARE EDUCATION/TRAINING PROGRAM

## 2024-10-09 PROCEDURE — 3074F SYST BP LT 130 MM HG: CPT | Mod: CPTII,GC,S$GLB, | Performed by: STUDENT IN AN ORGANIZED HEALTH CARE EDUCATION/TRAINING PROGRAM

## 2024-10-09 PROCEDURE — 99214 OFFICE O/P EST MOD 30 MIN: CPT | Mod: GC,S$GLB,, | Performed by: STUDENT IN AN ORGANIZED HEALTH CARE EDUCATION/TRAINING PROGRAM

## 2024-10-09 PROCEDURE — 3044F HG A1C LEVEL LT 7.0%: CPT | Mod: CPTII,GC,S$GLB, | Performed by: STUDENT IN AN ORGANIZED HEALTH CARE EDUCATION/TRAINING PROGRAM

## 2024-10-09 PROCEDURE — 4010F ACE/ARB THERAPY RXD/TAKEN: CPT | Mod: CPTII,GC,S$GLB, | Performed by: STUDENT IN AN ORGANIZED HEALTH CARE EDUCATION/TRAINING PROGRAM

## 2024-10-09 PROCEDURE — 1126F AMNT PAIN NOTED NONE PRSNT: CPT | Mod: CPTII,GC,S$GLB, | Performed by: STUDENT IN AN ORGANIZED HEALTH CARE EDUCATION/TRAINING PROGRAM

## 2024-10-09 PROCEDURE — 1159F MED LIST DOCD IN RCRD: CPT | Mod: CPTII,GC,S$GLB, | Performed by: STUDENT IN AN ORGANIZED HEALTH CARE EDUCATION/TRAINING PROGRAM

## 2024-10-14 ENCOUNTER — TELEPHONE (OUTPATIENT)
Dept: CARDIOLOGY | Facility: HOSPITAL | Age: 64
End: 2024-10-14
Payer: MEDICARE

## 2024-10-14 NOTE — PROGRESS NOTES
Subjective     Chief Complaint:  Follow-up    History of Present Illness:  Mr. Osiel Kerr is a 64 y.o. male with PMH of HTN, tobacco use, AF diagnosed at outside hospital in 2022 who presents for follow-up. Recently presented to the ED with left side facial droop, and slurred speech on 4/21. LKN 4/19, approx >48 hrs PTA. Vascular Neurology consulted for right acute lacunar infarct found on MRI Brain w/o Contrast. Outside of TNK window so not given. No documented evidence of AF while inpatient or recent 48 hour Holter. TTE with possible PFO.  Repeat MANNY at last visit confirmed small PFO.  At my last visit we had no evidence of atrial fibrillation in epic.  Event monitor was obtained which showed undetermined duration of atrial fibrillation.  He was then referred to electrophysiology, Dr. Singh.  Given the cardiac event monitor findings he underwent a loop recorder a few days ago.  He reports he had palpitations proximally 1 day after event monitor ending and the morning prior to his loop recorder implantation.  Otherwise he reports he has not had any palpitations.  He is on oral anticoagulation.  He reports occasionally having to hold a single dose due to bleeding hemorrhoids.  He also reports easy bruising. He denies orthopnea, lower extremity edema, dyspnea on exertion, syncope, angina.       PAST HISTORY:     Past Medical History:   Diagnosis Date    Essential (primary) hypertension     Mixed hyperlipidemia     Paroxysmal A-fib     Right-sided lacunar infarction 04/21/2024       Cardiac History   Results for orders placed during the hospital encounter of 06/11/24    Echo Saline Bubble? Yes    Interpretation Summary    Left Ventricle: The left ventricle is normal in size. Normal wall thickness. Normal wall motion. There is normal systolic function with a visually estimated ejection fraction of 60 - 65%. There is normal diastolic function.    Right Ventricle: Normal right ventricular cavity size. Wall thickness  "is normal. Systolic function is normal.    Left Atrium: Agitated saline study of the atrial septum is positive, intracardiac shunt at atrial level. There is a very small (<10 bubbles) shunt present with bubbles visualized crossing the interatrial septum.    Mitral Valve: There is mild regurgitation.    Pulmonary Artery: The estimated pulmonary artery systolic pressure is 23 mmHg.    IVC/SVC: Normal venous pressure at 3 mmHg.    EF   Date Value Ref Range Status   04/22/2024 65 % Final     No results found for this or any previous visit.      MEDICATIONS:     Current Outpatient Medications on File Prior to Visit   Medication Sig    atorvastatin (LIPITOR) 80 MG tablet Take 1 tablet (80 mg total) by mouth once daily.    ELIQUIS 5 mg Tab Take 1 tablet (5 mg total) by mouth 2 (two) times daily.    gabapentin (NEURONTIN) 100 MG capsule Take 1-3 capsules (100-300 mg total) by mouth nightly as needed (restless legs  (note: take 1-2 hours prior to usual onset of symptoms)).    lisinopriL 10 MG tablet Take 1 tablet (10 mg total) by mouth once daily.    metoprolol succinate (TOPROL-XL) 25 MG 24 hr tablet Take 1 tablet (25 mg total) by mouth once daily.    nicotine (NICODERM CQ) 21 mg/24 hr Place 1 patch onto the skin once daily. Change location daily.     No current facility-administered medications on file prior to visit.       SUBJECTIVE:     Review of Systems   Respiratory:  Positive for shortness of breath.    Cardiovascular:  Positive for palpitations. Negative for chest pain, orthopnea, claudication, leg swelling and PND.        OBJECTIVE:     Vital Signs:  Vitals:    10/09/24 1509   BP: 119/84   BP Location: Right arm   Patient Position: Sitting   Pulse: 67   Weight: 67.4 kg (148 lb 9.4 oz)   Height: 5' 8" (1.727 m)     Body mass index is 22.59 kg/m².     Physical Exam  HENT:      Head: Normocephalic and atraumatic.   Eyes:      Conjunctiva/sclera: Conjunctivae normal.   Neck:      Comments: No JVD at " "30°  Cardiovascular:      Rate and Rhythm: Normal rate and regular rhythm.      Heart sounds: Normal heart sounds.   Pulmonary:      Effort: Pulmonary effort is normal. No respiratory distress.      Breath sounds: Normal breath sounds. No wheezing.   Abdominal:      General: There is no distension.      Palpations: Abdomen is soft.      Tenderness: There is no abdominal tenderness.   Musculoskeletal:      Cervical back: Normal range of motion.      Right lower leg: No edema.      Left lower leg: No edema.   Neurological:      Mental Status: He is alert and oriented to person, place, and time.   Psychiatric:         Mood and Affect: Affect normal.         Laboratory  Lab Results   Component Value Date    WBC 7.30 07/10/2024    HGB 14.5 07/10/2024    HCT 44.6 07/10/2024    MCV 88 07/10/2024     07/10/2024     BMP  Lab Results   Component Value Date     07/10/2024    K 4.8 07/10/2024     07/10/2024    CO2 25 07/10/2024    BUN 13 07/10/2024    CREATININE 1.3 07/10/2024    CALCIUM 9.9 07/10/2024    ANIONGAP 7 (L) 07/10/2024    EGFRNORACEVR >60 07/10/2024     Lab Results   Component Value Date    INR 1.1 04/22/2024     Lab Results   Component Value Date    HGBA1C 5.7 (H) 07/10/2024     No results for input(s): "POCTGLUCOSE" in the last 72 hours.    Diagnostic Results:  Labs: Reviewed  ECG: Reviewed  Echo: Reviewed    ASSESSMENT & PLAN:     Atrial fibrillation  Apparent paroxysmal atrial fibrillation of indeterminate length  Follows with electrophysiology.  Recent loop recorder placed  Continue anticoagulation and metoprolol  He is potentially a candidate for left atrial appendage occluder device given his issues with hemorrhoids and easy bruising    Hypertension  Well controlled on lisinopril 10    Hyperlipidemia  Lab Results   Component Value Date    LDLCALC 66.6 07/10/2024     Well-controlled  Continue Lipitor 80      Discussed with Dr. Singh  - staff attestation to follow      Ethan , " MD  Cardiovascular Disease Fellow PGY-6

## 2024-10-14 NOTE — TELEPHONE ENCOUNTER
----- Message from Med Assistant Linda sent at 10/14/2024  2:47 PM CDT -----  The patient need to talk  to you about his apartment that schedule for today please call the patient at [phone]. Thank you

## 2024-10-14 NOTE — TELEPHONE ENCOUNTER
Returned the pt's call on this afternoon.  Pt stated he is currently on Vacation in the [facility] therefor unable to come into the clinic on today.  Pt with spotty cell service at times.  Informed the pt that anything that his ILR records will remain on the ILR and when he does have cell connection the remote Reza on his phone will transmit any recordings.  Pt was reminded to the Reza must remain open in the background on his phone all the time.  Pt unable to send a picture of the implant site at this time.  Confirmed with pt that there was no swelling, redness, open skin and/or drainage at the implant site.  Understanding was verbalized.  Pt appreciated the call.

## 2024-10-20 NOTE — PROGRESS NOTES
Phone visit. Pt states he is progressing well. Attemding follow up. Taking meds well. No new s/s . Post stroke education provided

## 2024-10-23 ENCOUNTER — TELEPHONE (OUTPATIENT)
Dept: ELECTROPHYSIOLOGY | Facility: CLINIC | Age: 64
End: 2024-10-23
Payer: MEDICARE

## 2024-10-23 ENCOUNTER — CLINICAL SUPPORT (OUTPATIENT)
Dept: CARDIOLOGY | Facility: HOSPITAL | Age: 64
End: 2024-10-23
Attending: INTERNAL MEDICINE
Payer: MEDICARE

## 2024-10-23 DIAGNOSIS — I48.0 PAROXYSMAL ATRIAL FIBRILLATION: ICD-10-CM

## 2024-10-24 ENCOUNTER — CLINICAL SUPPORT (OUTPATIENT)
Dept: SMOKING CESSATION | Facility: CLINIC | Age: 64
End: 2024-10-24
Payer: COMMERCIAL

## 2024-10-24 DIAGNOSIS — F17.200 NICOTINE DEPENDENCE: Primary | ICD-10-CM

## 2024-10-24 PROCEDURE — 99404 PREV MED CNSL INDIV APPRX 60: CPT | Mod: S$GLB,,,

## 2024-10-24 PROCEDURE — 99999 PR PBB SHADOW E&M-EST. PATIENT-LVL II: CPT | Mod: PBBFAC,,,

## 2024-10-24 NOTE — Clinical Note
Patient was seen in clinic today for follow up.  Patient relapsed and  smoking .5 to 1 ppd. The quit date was adjusted due to patient relapsed.  Patient remains on the prescribed tobacco cessation medication regimen of 21 mg nicotine patch QD  without any negative side effects at this time.   No refill is needed. Patient states that he had an argument with his wife which triggered him to smoke. We discussed the importance of stress management and finding other ways to deal with stress.  Encouraged patient to stay focused and not to give up.  Discussed tips and strategies to help with quit.  We reviewed coping with urges/cravings to smoke and motivation to quit. Encouraged patient to rate fade to 5 cpd for the next visit.  Patient will continue bi weekly sessions.

## 2024-10-24 NOTE — PROGRESS NOTES
Individual Follow-Up Form    10/24/2024    Quit Date: TBD    Clinical Status of Patient: Outpatient    Length of Service: 60 minutes    Continuing Medication: yes  Patches    Other Medications: none     Target Symptoms: Withdrawal and medication side effects. The following were  rated moderate (3) to severe (4) on TCRS:  Moderate (3): cravings-coping skills  Severe (4): none    Comments: Patient was seen in clinic today for follow up.  Patient relapsed and  smoking .5 to 1 ppd. The quit date was adjusted due to patient relapsed.  Patient remains on the prescribed tobacco cessation medication regimen of 21 mg nicotine patch QD  without any negative side effects at this time.   No refill is needed. Patient states that he had an argument with his wife which triggered him to smoke. We discussed the importance of stress management and finding other ways to deal with stress.  Encouraged patient to stay focused and not to give up.  Discussed tips and strategies to help with quit.  We reviewed coping with urges/cravings to smoke and motivation to quit. Encouraged patient to rate fade to 5 cpd for the next visit.  Patient will continue bi weekly sessions.       Diagnosis: F17.200    Next Visit: 2 weeks

## 2024-10-29 ENCOUNTER — CLINICAL SUPPORT (OUTPATIENT)
Dept: SMOKING CESSATION | Facility: CLINIC | Age: 64
End: 2024-10-29
Payer: COMMERCIAL

## 2024-10-29 ENCOUNTER — TELEPHONE (OUTPATIENT)
Dept: SMOKING CESSATION | Facility: CLINIC | Age: 64
End: 2024-10-29
Payer: MEDICARE

## 2024-10-29 DIAGNOSIS — F17.200 NICOTINE DEPENDENCE: Primary | ICD-10-CM

## 2024-10-29 PROCEDURE — 99407 BEHAV CHNG SMOKING > 10 MIN: CPT | Mod: S$GLB,,,

## 2024-10-29 PROCEDURE — 99999 PR PBB SHADOW E&M-EST. PATIENT-LVL I: CPT | Mod: PBBFAC,,,

## 2024-11-04 ENCOUNTER — OFFICE VISIT (OUTPATIENT)
Dept: NEUROLOGY | Facility: CLINIC | Age: 64
End: 2024-11-04
Payer: MEDICARE

## 2024-11-04 VITALS
HEART RATE: 57 BPM | HEIGHT: 68 IN | BODY MASS INDEX: 22.52 KG/M2 | SYSTOLIC BLOOD PRESSURE: 131 MMHG | DIASTOLIC BLOOD PRESSURE: 81 MMHG | WEIGHT: 148.56 LBS

## 2024-11-04 DIAGNOSIS — E78.2 HYPERLIPIDEMIA, MIXED: ICD-10-CM

## 2024-11-04 DIAGNOSIS — Z86.73 CHRONIC ISCHEMIC RIGHT MIDDLE CEREBRAL ARTERY (MCA) STROKE: ICD-10-CM

## 2024-11-04 DIAGNOSIS — I10 ESSENTIAL HYPERTENSION: ICD-10-CM

## 2024-11-04 DIAGNOSIS — Q21.12 PFO (PATENT FORAMEN OVALE): ICD-10-CM

## 2024-11-04 DIAGNOSIS — I48.0 PAROXYSMAL ATRIAL FIBRILLATION: ICD-10-CM

## 2024-11-04 DIAGNOSIS — Z79.01 CHRONIC ANTICOAGULATION: ICD-10-CM

## 2024-11-04 DIAGNOSIS — R73.03 PREDIABETES: ICD-10-CM

## 2024-11-04 DIAGNOSIS — F17.200 NICOTINE DEPENDENCE: ICD-10-CM

## 2024-11-04 DIAGNOSIS — F17.200 TOBACCO USE DISORDER, SEVERE, DEPENDENCE: ICD-10-CM

## 2024-11-04 DIAGNOSIS — R29.90 EPISODE OF TRANSIENT NEUROLOGIC SYMPTOMS: Primary | ICD-10-CM

## 2024-11-04 PROCEDURE — 3288F FALL RISK ASSESSMENT DOCD: CPT | Mod: CPTII,S$GLB,, | Performed by: STUDENT IN AN ORGANIZED HEALTH CARE EDUCATION/TRAINING PROGRAM

## 2024-11-04 PROCEDURE — 3075F SYST BP GE 130 - 139MM HG: CPT | Mod: CPTII,S$GLB,, | Performed by: STUDENT IN AN ORGANIZED HEALTH CARE EDUCATION/TRAINING PROGRAM

## 2024-11-04 PROCEDURE — 1126F AMNT PAIN NOTED NONE PRSNT: CPT | Mod: CPTII,S$GLB,, | Performed by: STUDENT IN AN ORGANIZED HEALTH CARE EDUCATION/TRAINING PROGRAM

## 2024-11-04 PROCEDURE — 4010F ACE/ARB THERAPY RXD/TAKEN: CPT | Mod: CPTII,S$GLB,, | Performed by: STUDENT IN AN ORGANIZED HEALTH CARE EDUCATION/TRAINING PROGRAM

## 2024-11-04 PROCEDURE — 99214 OFFICE O/P EST MOD 30 MIN: CPT | Mod: S$GLB,,, | Performed by: STUDENT IN AN ORGANIZED HEALTH CARE EDUCATION/TRAINING PROGRAM

## 2024-11-04 PROCEDURE — 3008F BODY MASS INDEX DOCD: CPT | Mod: CPTII,S$GLB,, | Performed by: STUDENT IN AN ORGANIZED HEALTH CARE EDUCATION/TRAINING PROGRAM

## 2024-11-04 PROCEDURE — 1160F RVW MEDS BY RX/DR IN RCRD: CPT | Mod: CPTII,S$GLB,, | Performed by: STUDENT IN AN ORGANIZED HEALTH CARE EDUCATION/TRAINING PROGRAM

## 2024-11-04 PROCEDURE — 99999 PR PBB SHADOW E&M-EST. PATIENT-LVL III: CPT | Mod: PBBFAC,,, | Performed by: STUDENT IN AN ORGANIZED HEALTH CARE EDUCATION/TRAINING PROGRAM

## 2024-11-04 PROCEDURE — 1159F MED LIST DOCD IN RCRD: CPT | Mod: CPTII,S$GLB,, | Performed by: STUDENT IN AN ORGANIZED HEALTH CARE EDUCATION/TRAINING PROGRAM

## 2024-11-04 PROCEDURE — 3079F DIAST BP 80-89 MM HG: CPT | Mod: CPTII,S$GLB,, | Performed by: STUDENT IN AN ORGANIZED HEALTH CARE EDUCATION/TRAINING PROGRAM

## 2024-11-04 PROCEDURE — 3044F HG A1C LEVEL LT 7.0%: CPT | Mod: CPTII,S$GLB,, | Performed by: STUDENT IN AN ORGANIZED HEALTH CARE EDUCATION/TRAINING PROGRAM

## 2024-11-04 PROCEDURE — 1101F PT FALLS ASSESS-DOCD LE1/YR: CPT | Mod: CPTII,S$GLB,, | Performed by: STUDENT IN AN ORGANIZED HEALTH CARE EDUCATION/TRAINING PROGRAM

## 2024-11-04 RX ORDER — IBUPROFEN 200 MG
1 TABLET ORAL DAILY
Qty: 28 PATCH | Refills: 0 | Status: SHIPPED | OUTPATIENT
Start: 2024-11-04

## 2024-11-04 NOTE — PROGRESS NOTES
"[facility]rology [facility]  Follow up Visit  Patient Name: Osiel Kerr  MRN: [id number]    CC: R CR infarct  Interval History 11/4/2024:  Patient returns for follow-up. He presents alone.   Went omn vacation a few weeks ago and he had two episodes that concerned him    - Kept saying that someone name was Sophia but his name was Aristides    - Lasted 4-5 minutes or so     - Right before he went on vacation \    - Sophia is his sister's in law sister      - Mother in law was having a bad day and he went to check on her     - The episode in question was the day after     - Unsure if he this is related since he was      - Kept saying pancakes instead of petite     - He is unsure if this is because he was excited about a particular restaurant     - Lasted 4-5 minutes     - Had to take a break from this   No other symptoms at that time    - He felt that his two hemisphere weren't "communicating"    - He denies any other   Still is taking Eliquis 5 mg BID  ILR is placed   No previous episodes such as this     Interval History:   Patient returns for follow up. Presents today without his wife.   Cards visit 7/9/24   - Eliquis for Afib on I-watch    - Card event monitor pending read    - PFO closure unnecessary, agree with that    - EP referral     - Dr. Singh on 7/29/2024   - Reports that he had episodes where he fell due to the afib    - Reports dizziness, reports near black outs of vision and is worried about these episodes occurring while he is driving   Reports Afib episode on the 20th    - Saturday    - Noted it initially, then had to go get his watch to record it    - Lasted 5 minutes or so    - Was sitting and resting when the episode began    - Resolved spontaneously    - RVR (155 HR)  Stopped smoking    - In smoking cessation program    - Patch     HPI: Osiel Kerr is a 64 y.o. R-handed male w/ PMH significant for pAfib, Tobacco abuse, HLD, HTN presenting as referral status-post hospital admission on 4/21/2024.   Presented w/ " LSFD and dysarthria w/ LKW >48 hrs PTA    - OOW for TNK and no LVO on MRA   Discharged to Home w/ NIHSS of 0, mrS of 1.   - Patient does report that he still had facial droop and a slurred speech     - 1 week past the discharge speech went to normal     - 2-3 days after DC his face returned to normal   Seen cards on 6/03/2024   - Pending another ECHO and cardiac event monitor   - States that he feels when he is in Afib    - Has been diagnosed w/ Afib x 2 1/2 years    - No previous procedures    - Does monitor Afib w/ apple watch - Afib recorded 4/17- 4/21    - Mid May  - skipped 1 dose due to hard palate bleeding    - At times would skip one dose  of AC, but never more than that   Currently on Eliquis 5 mg BID and on Atorvastatin 80 mg (previously not on a statin)   Nicotine Patch currently    - May 3rd was the last cigarette as reported by the patient.      Brain Imaging  MRI Brain w/o       Right corona radiata acute lacunar type infarct.  No intracranial hemorrhage or major vascular distribution infarct.  Changes of mild chronic microvascular ischemic disease and cerebral volume loss with few remote right-sided lacunar type infarcts.     Vessel Imaging  MRA H/N  Extracranial carotid circulation: No hemodynamically significant stenosis, aneurysmal dilatation, or dissection.  Extracranial vertebral circulation: Left slightly dominant.  No hemodynamically significant stenosis, aneurysmal dilatation, or dissection.  Intracranial Arteries: No focal high-grade stenosis, occlusion, or aneurysm.  Bilateral P comms and A-comm not identified and likely markedly hypoplastic or absent.  Cardiac Evaluation  ECHO     Left Ventricle: The left ventricle is normal in size. Ventricular mass is normal. Normal wall thickness. Normal wall motion. There is normal systolic function. Ejection fraction by visual approximation is 65%. There is normal diastolic function.    Right Ventricle: Normal right ventricular cavity size. Wall  thickness is normal. Right ventricle wall motion  is normal. Systolic function is normal.    Left Atrium: Agitated saline study of the atrial septum is positive, intracardiac shunt at atrial level. There is a very small (<10 bubbles) atrial shunt.    Mitral Valve: There is mild regurgitation.    Pulmonary Artery: The estimated pulmonary artery systolic pressure is 30 mmHg.    IVC/SVC: Normal venous pressure at 3 mmHg.       Relevant Lab work   Recent Labs   Lab 07/10/24  1135   Hemoglobin A1C 5.7 H   LDL Cholesterol 66.6   HDL 50   Triglycerides 67   Cholesterol 130         NIH Stroke Scale:  Interval: baseline (upon arrival/admit)  Level of Consciousness: 0 - alert  LOC Questions: 0 - answers both correctly  LOC Commands: 0 - performs both correctly  Best Gaze: 0 - normal  Visual: 0 - no visual loss  Facial Palsy: 0 - normal  Motor Left Arm: 0 - no drift  Motor Right Arm: 0 - no drift  Motor Left Le - no drift  Motor Right Le - no drift  Limb Ataxia: 0 - absent  Sensory: 0 - normal  Best Language: 0 - no aphasia  Dysarthria: 0 - normal articulation  Extinction and Inattention: 0 - no neglect  NIH Stroke Scale Total: 0         Review of Systems:  General: No fevers, chills  Eyes: No changes in vision  ENT: No changes in hearing  Respiratory: No SOB  CV: No chest pain, palpitations  GI: No diarrhea, blood in stool  Urinary: No dysuria, hematuria  Skin: No rashes  Neurological: No weakness, confusion  Psychiatric: No auditory nor visual hallucinations      Past Medical History  Past Medical History:   Diagnosis Date    Essential (primary) hypertension     Mixed hyperlipidemia     Paroxysmal A-fib     Right-sided lacunar infarction 2024       Medications    Current Outpatient Medications:     atorvastatin (LIPITOR) 80 MG tablet, Take 1 tablet (80 mg total) by mouth once daily., Disp: 90 tablet, Rfl: 3    ELIQUIS 5 mg Tab, Take 1 tablet (5 mg total) by mouth 2 (two) times daily., Disp: , Rfl:      gabapentin (NEURONTIN) 100 MG capsule, Take 1-3 capsules (100-300 mg total) by mouth nightly as needed (restless legs  (note: take 1-2 hours prior to usual onset of symptoms))., Disp: 90 capsule, Rfl: 1    lisinopriL 10 MG tablet, Take 1 tablet (10 mg total) by mouth once daily., Disp: 90 tablet, Rfl: 3    metoprolol succinate (TOPROL-XL) 25 MG 24 hr tablet, Take 1 tablet (25 mg total) by mouth once daily., Disp: 90 tablet, Rfl: 1    nicotine (NICODERM CQ) 21 mg/24 hr, Place 1 patch onto the skin once daily. Change location daily., Disp: 28 patch, Rfl: 0  Any other notable medications as documented in HPI    Allergies  Review of patient's allergies indicates:   Allergen Reactions    Penicillin Other (See Comments)     As a child       Social History  Social History     Socioeconomic History    Marital status:     Number of children: 2    Years of education: 12    Highest education level: High school graduate   Occupational History    Occupation:    Tobacco Use    Smoking status: Former     Current packs/day: 1.00     Average packs/day: 1 pack/day for 47.4 years (45.9 ttl pk-yrs)     Types: Cigarettes, Vaping with nicotine     Start date: 1977     Quit date: 5/18/2024    Smokeless tobacco: Never    Tobacco comments:     Smoked 4 cigarettes on 6/16/2024   Substance and Sexual Activity    Alcohol use: Yes     Comment: occassionally    Drug use: Never     Social Drivers of Health     Financial Resource Strain: Low Risk  (7/24/2024)    Overall Financial Resource Strain (CARDIA)     Difficulty of Paying Living Expenses: Not hard at all   Food Insecurity: No Food Insecurity (7/24/2024)    Hunger Vital Sign     Worried About Running Out of Food in the Last Year: Never true     Ran Out of Food in the Last Year: Never true   Transportation Needs: No Transportation Needs (4/22/2024)    PRAPARE - Transportation     Lack of Transportation (Medical): No     Lack of Transportation (Non-Medical): No   Physical  "Activity: Insufficiently Active (7/24/2024)    Exercise Vital Sign     Days of Exercise per Week: 1 day     Minutes of Exercise per Session: 40 min   Stress: No Stress Concern Present (7/24/2024)    [employer] [employer] - Occupational Stress Questionnaire     Feeling of Stress : Not at all   Housing Stability: Low Risk  (7/24/2024)    Housing Stability Vital Sign     Unable to Pay for Housing in the Last Year: No     Homeless in the Last Year: No     Any other notable Social History as documented in HPI.    Family History  Family History   Problem Relation Name Age of Onset    Diabetes Mellitus Mother      Lymphoma Father  62    Hypertension Sister      Hypertension Sister      Hypertension Brother      Heart attack Maternal Grandfather  53     Any other notable FMH as documented in HPI.    Physical Exam  /81 (BP Location: Right arm, Patient Position: Sitting)   Pulse (!) 57   Ht 5' 8" (1.727 m)   Wt 67.4 kg (148 lb 9.4 oz)   BMI 22.59 kg/m²     General: Well-developed, well-groomed. No apparent distress  HENT: Atraumatic.    Cardiovascular: Regular rate and rhythm  Chest: No audible wheezes, stridor, ronchi appreciated.  Musculoskeletal: No peripheral edema    Neurologic Exam: The patient is awake, alert and oriented to person, place, time and situation. Attentive, vigilant during exam. Language is fluent. Naming & repetition intact, +2-step commands.  Fund of knowledge is appropriate. Well organized thoughts.      Cranial nerves:   CN II: Visual fields are full to confrontation. Pupils are 4 mm and briskly reactive to light.  CN III, IV, VI: EOMI, no nystagmus, no ptosis  CN V: Facial sensation is intact in all 3 divisions bilaterally.  CN VII: Mild L facial asymmetry noted   CN VIII: Hearing is normal bilaterally  CN IX, X: Palate elevates symmetrically. Phonation is normal.  CN XI: Head turning and shoulder shrug are intact  CN XII: Tongue is midline with normal movements and no " atrophy.    Motor examination of all extremities demonstrates normal bulk and tone in all four limbs. There are no atrophy or fasciculations.      Left Right   Left Right   Deltoid 5/5 5/5  Hip Flexion 5/5 5/5   Biceps 5/5 5/5  Hip Extension 5/5 5/5   Triceps 5/5 5/5  Knee Flexion 5/5 5/5   Wrist Ext 5-/5 5/5  Knee Extension 5/5 5/5   Finger Abd 5-/5 5/5  Ankle dorsiflex 5/5 5/5       Ankle plantar flex 5/5 5/5       Sensory examination is normal light touch in BUE and BLE.  Romberg is negative.    Deep tendon reflexes  No clonus. Negative Moore's    Gait: Normal tandem, and casual gait. Heel and toe walking are normal.     Coordination: No dysmetria with finger-to-nose. Rapid alternating movements and fine finger movements are intact.     Assessment and Plan  Transient word finding difficulty x 2, not associated w/ initial R MCA stroke    - Patient compliant w/ Eliquis    - No ILR triggers for Afib and nothing on the [employer] watch during these events    - Since these episodes are new, albeit transient, will obtain repeat MRI brain to ensure no new pathology noted affecting the L hemisphere   Patient continues w/ follow up w/ Cardiology  Agree with continuation of AC  RF modification as below     Follow up after imaging is completed       Recommendations:   Antiplatelet/Anticoagulation: Eliquis 5 mg BID   Lipid Management: Atorvastatin 80 mg QHS (long-term goal LDL < 70).   - Monitoring for liver dysfunction and myopathy is suggested for statins. To be addressed by PCP  Diabetes: Target hemoglobin A1c <7%, measured 2X/year or quarterly if not meeting goals  Hypertension: Long term goal is normotension w/ target BP of less than 130/80 mmHg  Sleep: Denies any symptoms of SHANTA. Consider Sleep Medicine follow up in the future if suspicion for SHANTA occurs.   Smoking: Goal is smoking cessation and encouragement not to start smoking in the future.    - Smoking cessation participation encouraged.    - Patch in place  Diet:  Discussed Mediterranean Diet recommendations (Adopted from Ajit et al, NEJ, 2018.)  - Eat primarily plant-based foods, such as fruits and vegetables, whole grains, legumes (beans) and nuts  - Limit refined carbohydrates (white pasta, bread, rice).  - Replace butter with healthy fats such as olive oil.  - Use herbs and spices instead of salt to flavor foods.  - Limit red meat and processed meats to no more than a few times a month.  - Avoid sugary sodas, bakery goods, and sweets.  - Eat fish and poultry at least twice a week.  - Get plenty of exercise (150 minutes per week).    35 minutes were spent on the date of this patient encounter, which includes: preparing to see the patient, reviewing previous history, obtaining new patient history, performing the physical exam, counseling and educating the patient and/or family/caregiver, ordering necessary medications or tests or referrals, documenting in the electronic medical record, coordinating care.       Problem List Items Addressed This Visit          Neuro    Chronic ischemic right middle cerebral artery (MCA) stroke    Relevant Orders    MRI Brain Without Contrast    Episode of transient neurologic symptoms - Primary       Cardiac/Vascular    Essential hypertension    Paroxysmal atrial fibrillation    Hyperlipidemia, mixed    PFO (patent foramen ovale)       Hematology    Chronic anticoagulation       Endocrine    Prediabetes       Other    Tobacco use disorder, severe, dependence               Hallie Howard MD  Vascular Neurology  [facility] [address] [place] [address]

## 2024-11-05 PROBLEM — Z86.73 CHRONIC ISCHEMIC RIGHT MIDDLE CEREBRAL ARTERY (MCA) STROKE: Status: ACTIVE | Noted: 2024-04-21

## 2024-11-05 PROBLEM — R29.90 EPISODE OF TRANSIENT NEUROLOGIC SYMPTOMS: Status: ACTIVE | Noted: 2024-11-05

## 2024-11-06 ENCOUNTER — CLINICAL SUPPORT (OUTPATIENT)
Dept: CARDIOLOGY | Facility: HOSPITAL | Age: 64
End: 2024-11-06
Payer: MEDICARE

## 2024-11-06 ENCOUNTER — CLINICAL SUPPORT (OUTPATIENT)
Dept: CARDIOLOGY | Facility: HOSPITAL | Age: 64
End: 2024-11-06
Attending: INTERNAL MEDICINE
Payer: MEDICARE

## 2024-11-06 DIAGNOSIS — I49.8 OTHER SPECIFIED CARDIAC ARRHYTHMIAS: ICD-10-CM

## 2024-11-06 PROCEDURE — 93298 REM INTERROG DEV EVAL SCRMS: CPT | Mod: 26,,, | Performed by: INTERNAL MEDICINE

## 2024-11-06 PROCEDURE — 93298 REM INTERROG DEV EVAL SCRMS: CPT | Performed by: INTERNAL MEDICINE

## 2024-11-07 ENCOUNTER — CLINICAL SUPPORT (OUTPATIENT)
Dept: SMOKING CESSATION | Facility: CLINIC | Age: 64
End: 2024-11-07
Payer: COMMERCIAL

## 2024-11-07 DIAGNOSIS — F17.200 NICOTINE DEPENDENCE: Primary | ICD-10-CM

## 2024-11-07 PROCEDURE — 99999 PR PBB SHADOW E&M-EST. PATIENT-LVL II: CPT | Mod: PBBFAC,,,

## 2024-11-07 PROCEDURE — 99404 PREV MED CNSL INDIV APPRX 60: CPT | Mod: S$GLB,,,

## 2024-11-07 NOTE — PROGRESS NOTES
Individual Follow-Up Form    11/7/2024    Quit Date:     Clinical Status of Patient: Outpatient    Length of Service: 60 minutes    Continuing Medication: yes  Patches    Other Medications: none     Target Symptoms: Withdrawal and medication side effects. The following were  rated moderate (3) to severe (4) on TCRS:  Moderate (3): cravings-coping skills  Severe (4): none    Comments: Patient was seen in clinic today for follow up.  Patient remains tobacco free since October 28, 2024.  Patient's CO monitor was 3  ppm. Commended patient for the accomplishment thus far.  Patient remains on the prescribed tobacco cessation medication regimen of 21 mg nicotine patch QD  without any negative side effects at this time. No refill is needed.  We reviewed coping with urges/cravings to smoke and motivation to stay quit. Reviewed strategies, habitual behavior, high risks situations, understanding urges and cravings, stress and relaxation with open discussion and additional interventions. Introduced lapses, relapses, understanding them and analyzing the situation of a lapse, conflict issues that may be linked to a lapse.  Patient will continue bi weekly sessions.      Diagnosis: F17.200    Next Visit: 2 weeks

## 2024-11-07 NOTE — Clinical Note
Patient was seen in clinic today for follow up.  Patient remains tobacco free since October 28, 2024.  Patient's CO monitor was 3  ppm. Commended patient for the accomplishment thus far.  Patient remains on the prescribed tobacco cessation medication regimen of 21 mg nicotine patch QD  without any negative side effects at this time. No refill is needed.  We reviewed coping with urges/cravings to smoke and motivation to stay quit. Reviewed strategies, habitual behavior, high risks situations, understanding urges and cravings, stress and relaxation with open discussion and additional interventions. Introduced lapses, relapses, understanding them and analyzing the situation of a lapse, conflict issues that may be linked to a lapse.  Patient will continue bi weekly sessions.

## 2024-11-08 ENCOUNTER — HOSPITAL ENCOUNTER (OUTPATIENT)
Dept: RADIOLOGY | Facility: HOSPITAL | Age: 64
Discharge: HOME | End: 2024-11-08
Attending: STUDENT IN AN ORGANIZED HEALTH CARE EDUCATION/TRAINING PROGRAM
Payer: MEDICARE

## 2024-11-08 DIAGNOSIS — Z86.73 CHRONIC ISCHEMIC RIGHT MIDDLE CEREBRAL ARTERY (MCA) STROKE: ICD-10-CM

## 2024-11-08 PROCEDURE — 70551 MRI BRAIN STEM W/O DYE: CPT | Mod: TC

## 2024-11-18 ENCOUNTER — TELEPHONE (OUTPATIENT)
Dept: ELECTROPHYSIOLOGY | Facility: CLINIC | Age: 64
End: 2024-11-18
Payer: MEDICARE

## 2024-11-18 DIAGNOSIS — I48.0 PAROXYSMAL ATRIAL FIBRILLATION: Primary | ICD-10-CM

## 2024-11-18 RX ORDER — SOTALOL HYDROCHLORIDE 80 MG/1
80 TABLET ORAL 2 TIMES DAILY
Qty: 60 TABLET | Refills: 11 | Status: SHIPPED | OUTPATIENT
Start: 2024-11-18 | End: 2025-11-18

## 2024-11-18 NOTE — TELEPHONE ENCOUNTER
ILR remote transmission received for mult tachy episodes between 11/15/24 and 11/16/2024,   max duration 1  hour 21 minutes, no egm available, average V rate 192 bpm. Available egms c/w AT/SVT.  Pt also had AT/AF episode x 2, max duration on 11/15/2024 was 17 hours 2 minutes with poor rate control.    Pt symptoms:  pt states he felt bad on Friday and Saturday.  He felt his heart racing, dizzy, SOB and tired.  He had to sit down and rest when his heart was racing.  Pt feels good today.    Pt taking Eliquis and Toprol XL 25 mg QD     Pt doesn't check his Bp at home, last Bp was 131/81 on 11/4/2024

## 2024-11-18 NOTE — TELEPHONE ENCOUNTER
Spoke with pt, discussed starting Sotalol with EKG on 11/21. Pt verb understanding. Medications, allergies, and pharmacy verified.

## 2024-11-21 ENCOUNTER — HOSPITAL ENCOUNTER (OUTPATIENT)
Dept: CARDIOLOGY | Facility: CLINIC | Age: 64
Discharge: HOME | End: 2024-11-21
Payer: MEDICARE

## 2024-11-21 ENCOUNTER — CLINICAL SUPPORT (OUTPATIENT)
Dept: SMOKING CESSATION | Facility: CLINIC | Age: 64
End: 2024-11-21
Payer: COMMERCIAL

## 2024-11-21 DIAGNOSIS — I48.0 PAROXYSMAL ATRIAL FIBRILLATION: ICD-10-CM

## 2024-11-21 DIAGNOSIS — F17.200 NICOTINE DEPENDENCE: Primary | ICD-10-CM

## 2024-11-21 LAB
OHS QRS DURATION: 80 MS
OHS QTC CALCULATION: 407 MS

## 2024-11-21 PROCEDURE — 93005 ELECTROCARDIOGRAM TRACING: CPT | Mod: S$GLB,,, | Performed by: INTERNAL MEDICINE

## 2024-11-21 PROCEDURE — 93010 ELECTROCARDIOGRAM REPORT: CPT | Mod: S$GLB,,, | Performed by: INTERNAL MEDICINE

## 2024-11-21 PROCEDURE — 99999 PR PBB SHADOW E&M-EST. PATIENT-LVL I: CPT | Mod: PBBFAC,,,

## 2024-11-21 PROCEDURE — 99407 BEHAV CHNG SMOKING > 10 MIN: CPT | Mod: S$GLB,,,

## 2024-11-21 NOTE — TELEPHONE ENCOUNTER
Spoke with patient - instructed to continue taking Sotalol 80 mg BID and to stop metoprolol. Pt verb understanding.

## 2024-11-21 NOTE — PROGRESS NOTES
Called to check on patient regarding canceled follow up appointment.  Patient remains tobacco free since October 18, 2024.  Commended patient for the accomplishment thus far. No refill is needed.  We reviewed coping with urges/cravings to smoke and motivation to stay quit.  We reviewed lapses ,relapses, weight gain, benefits and reasons to remain quit.  Patient is scheduled on December 04, 2024 at 9:30 AM.

## 2024-11-27 LAB
OHS CV AF BURDEN PERCENT: < 1
OHS CV DC REMOTE DEVICE TYPE: NORMAL

## 2024-12-04 ENCOUNTER — CLINICAL SUPPORT (OUTPATIENT)
Dept: SMOKING CESSATION | Facility: CLINIC | Age: 64
End: 2024-12-04
Payer: COMMERCIAL

## 2024-12-04 DIAGNOSIS — F17.200 NICOTINE DEPENDENCE: Primary | ICD-10-CM

## 2024-12-04 PROCEDURE — 99999 PR PBB SHADOW E&M-EST. PATIENT-LVL II: CPT | Mod: PBBFAC,,,

## 2024-12-04 PROCEDURE — 99404 PREV MED CNSL INDIV APPRX 60: CPT | Mod: S$GLB,,,

## 2024-12-04 RX ORDER — IBUPROFEN 200 MG
1 TABLET ORAL DAILY
Qty: 28 PATCH | Refills: 0 | Status: SHIPPED | OUTPATIENT
Start: 2024-12-04

## 2024-12-04 NOTE — Clinical Note
Patient was seen in clinic today for follow up. Patient remains tobacco free since  October 18, 2024.   Patient's CO monitor was  2 ppm.   Patient remains on the prescribed tobacco cessation medication regimen of 21 mg nicotine patch QD  without any negative side effects at this time. Patient reports mild tobacco desire.  We discussed about adjusting nicotine patches dosage.  Patient will begin the prescribed tobacco cessation medication regimen of  14 mg nicotine patch QD. Reviewed strategies, cues, triggers, high risk situations, lapses, relapses, diet, exercise, stress, relaxation, sleep, habitual behavior and lifestyle changes.  Patient will continue bi weekly session.

## 2024-12-04 NOTE — PROGRESS NOTES
Individual Follow-Up Form    12/4/2024    Quit Date: 10-    Clinical Status of Patient: Outpatient    Length of Service: 60 minutes    Continuing Medication: yes  Patches    Other Medications: none     Target Symptoms: Withdrawal and medication side effects. The following were  rated moderate (3) to severe (4) on TCRS:  Moderate (3): none  Severe (4): none    Comments: Patient was seen in clinic today for follow up. Patient remains tobacco free since  October 18, 2024.   Patient's CO monitor was  2 ppm.   Patient remains on the prescribed tobacco cessation medication regimen of 21 mg nicotine patch QD  without any negative side effects at this time. Patient reports mild tobacco desire.  We discussed about adjusting nicotine patches dosage.  Patient will begin the prescribed tobacco cessation medication regimen of  14 mg nicotine patch QD. Reviewed strategies, cues, triggers, high risk situations, lapses, relapses, diet, exercise, stress, relaxation, sleep, habitual behavior and lifestyle changes.  Patient will continue bi weekly session.          Diagnosis: F17.200    Next Visit: 2 weeks

## 2024-12-05 ENCOUNTER — OFFICE VISIT (OUTPATIENT)
Dept: INTERNAL MEDICINE | Facility: CLINIC | Age: 64
End: 2024-12-05
Payer: MEDICARE

## 2024-12-05 VITALS
WEIGHT: 144.38 LBS | HEIGHT: 68 IN | BODY MASS INDEX: 21.88 KG/M2 | DIASTOLIC BLOOD PRESSURE: 70 MMHG | HEART RATE: 67 BPM | OXYGEN SATURATION: 98 % | SYSTOLIC BLOOD PRESSURE: 106 MMHG

## 2024-12-05 DIAGNOSIS — Z00.00 ENCOUNTER FOR MEDICARE ANNUAL WELLNESS EXAM: Primary | ICD-10-CM

## 2024-12-05 DIAGNOSIS — E78.2 HYPERLIPIDEMIA, MIXED: ICD-10-CM

## 2024-12-05 DIAGNOSIS — I48.0 PAROXYSMAL ATRIAL FIBRILLATION: ICD-10-CM

## 2024-12-05 DIAGNOSIS — I70.0 AORTIC ATHEROSCLEROSIS: ICD-10-CM

## 2024-12-05 DIAGNOSIS — E53.1 VITAMIN B6 DEFICIENCY NEUROPATHY: ICD-10-CM

## 2024-12-05 DIAGNOSIS — Z86.73 CHRONIC ISCHEMIC RIGHT MIDDLE CEREBRAL ARTERY (MCA) STROKE: ICD-10-CM

## 2024-12-05 DIAGNOSIS — I10 ESSENTIAL HYPERTENSION: ICD-10-CM

## 2024-12-05 DIAGNOSIS — Z12.11 COLON CANCER SCREENING: ICD-10-CM

## 2024-12-05 DIAGNOSIS — G63 VITAMIN B6 DEFICIENCY NEUROPATHY: ICD-10-CM

## 2024-12-05 DIAGNOSIS — R73.03 PREDIABETES: ICD-10-CM

## 2024-12-05 DIAGNOSIS — F17.200 TOBACCO USE DISORDER, SEVERE, DEPENDENCE: ICD-10-CM

## 2024-12-05 DIAGNOSIS — Q21.12 PFO (PATENT FORAMEN OVALE): ICD-10-CM

## 2024-12-05 PROCEDURE — 99999 PR PBB SHADOW E&M-EST. PATIENT-LVL V: CPT | Mod: PBBFAC,,, | Performed by: NURSE PRACTITIONER

## 2024-12-05 NOTE — PROGRESS NOTES
Osiel Kerr presented for a follow-up Medicare AWV today. The following components were reviewed and updated:    Medical history  Family History  Social history  Allergies and Current Medications  Health Risk Assessment  Health Maintenance  Care Team    **See Completed Assessments for Annual Wellness visit with in the encounter summary    The following assessments were completed:  Depression Screening  Cognitive function Screening  Timed Get Up Test  Whisper Test      Opioid documentation:      Patient does not have a current opioid prescription.          There were no vitals filed for this visit.  There is no height or weight on file to calculate BMI.       Physical Exam  Constitutional:       General: He is not in acute distress.     Appearance: Normal appearance. He is not ill-appearing, toxic-appearing or diaphoretic.   HENT:      Head: Normocephalic and atraumatic.      Right Ear: Tympanic membrane, ear canal and external ear normal.      Left Ear: Tympanic membrane, ear canal and external ear normal.      Mouth/Throat:      Mouth: Mucous membranes are moist.      Pharynx: Oropharynx is clear.   Eyes:      Pupils: Pupils are equal, round, and reactive to light.   Cardiovascular:      Rate and Rhythm: Normal rate and regular rhythm.      Heart sounds: Normal heart sounds.   Pulmonary:      Effort: Pulmonary effort is normal.      Breath sounds: Normal breath sounds.   Abdominal:      General: Abdomen is flat.      Palpations: Abdomen is soft.   Musculoskeletal:         General: Normal range of motion.      Cervical back: Normal range of motion and neck supple.   Skin:     General: Skin is warm and dry.   Neurological:      Mental Status: He is alert and oriented to person, place, and time.   Psychiatric:         Behavior: Behavior normal.           Diagnoses and health risks identified today and associated recommendations/orders:  1. Encounter for Medicare annual wellness exam  Here for Health Risk  Assessment/Annual Wellness Visit. Health maintenance reviewed and updated. Follow up in one year.  -Patient declined all recommended vaccinations   - Ambulatory Referral/Consult to Enhanced Annual Wellness Visit (eAWV)    2. Chronic ischemic right middle cerebral artery (MCA) stroke  Problem is stable. Will continue current management. Follow up with PCP      3. Essential hypertension  Problem is stable. Will continue current management. Follow up with PCP      4. Vitamin B6 deficiency neuropathy  Problem is stable. Will continue current management. Follow up with PCP      5. Paroxysmal atrial fibrillation  Problem is stable. Will continue current management. Follow up with PCP/cardiology      6. Hyperlipidemia, mixed  Problem is stable. Will continue current management. Follow up with PCP      7. PFO (patent foramen ovale)  Problem is stable. Will continue current management. Follow up with PCP/cardiology       8. Prediabetes  Problem is stable. Will continue current management. Follow up with PCP      9. Tobacco use disorder, severe, dependence  Problem is stable. Will continue current management. Follow up with PCP/smoking cessation       10. Aortic atherosclerosis  Problem is stable. Will continue current management. Follow up with PCP      11. Colon cancer screening  Patient reports normal c-scope at OSH 1.5 years ago     Provided Osiel with a 5-10 year written screening schedule and personal prevention plan. Recommendations were developed using the USPSTF age appropriate recommendations. Education, counseling, and referrals were provided as needed.  After Visit Summary printed and given to patient which includes a list of additional screenings\tests needed.        Claudio Mcgraw NP      I offered to discuss advanced care planning, including how to pick a person who would make decisions for you if you were unable to make them for yourself, called a health care power of , and what kind of decisions  you might make such as use of life sustaining treatments such as ventilators and tube feeding when faced with a life limiting illness recorded on a living will that they will need to know. (How you want to be cared for as you near the end of your natural life)     X Patient is interested in learning more about how to make advanced directives.  I provided them paperwork and offered to discuss this with them.

## 2024-12-05 NOTE — PATIENT INSTRUCTIONS
Counseling and Referral of Other Preventative  (Italic type indicates deductible and co-insurance are waived)    Patient Name: Osiel Kerr  Today's Date: 12/5/2024    Health Maintenance       Date Due Completion Date    TETANUS VACCINE Never done ---    Colorectal Cancer Screening Never done ---    Shingles Vaccine (1 of 2) Never done ---    RSV Vaccine (Age 60+ and Pregnant patients) (1 - Risk 60-74 years 1-dose series) Never done ---    Abdominal Aortic Aneurysm Screening Never done ---    Influenza Vaccine (1) Never done ---    COVID-19 Vaccine (3 - 2024-25 season) 09/01/2024 5/3/2021    Hemoglobin A1c (Prediabetes) 07/10/2025 7/10/2024    LDCT Lung Screen 07/29/2025 7/29/2024    High Dose Statin 11/05/2025 11/5/2024    Lipid Panel 07/10/2029 7/10/2024        No orders of the defined types were placed in this encounter.      The following information is provided to all patients.  This information is to help you find resources for any of the problems found today that may be affecting your health:                  Living healthy guide: www.[facility].[place].gov      Understanding Diabetes: www.diabetes.org      Eating healthy: www.cdc.gov/healthyweight      CDC home safety checklist: www.cdc.gov/steadi/patient.html      Agency on Aging: www.goea.[place].[facility]      Alcoholics anonymous (AA): www.aa.org      Physical Activity: www.camron.nih.gov/bb3gulu      Tobacco use: www.quitwithusla.org

## 2024-12-08 ENCOUNTER — CLINICAL SUPPORT (OUTPATIENT)
Dept: CARDIOLOGY | Facility: HOSPITAL | Age: 64
End: 2024-12-08
Payer: MEDICARE

## 2024-12-08 ENCOUNTER — CLINICAL SUPPORT (OUTPATIENT)
Dept: CARDIOLOGY | Facility: HOSPITAL | Age: 64
End: 2024-12-08
Attending: INTERNAL MEDICINE
Payer: MEDICARE

## 2024-12-08 DIAGNOSIS — I49.8 OTHER SPECIFIED CARDIAC ARRHYTHMIAS: ICD-10-CM

## 2024-12-08 PROCEDURE — 93298 REM INTERROG DEV EVAL SCRMS: CPT | Performed by: INTERNAL MEDICINE

## 2024-12-08 PROCEDURE — 93298 REM INTERROG DEV EVAL SCRMS: CPT | Mod: 26,,, | Performed by: INTERNAL MEDICINE

## 2024-12-12 LAB
OHS CV AF BURDEN PERCENT: < 1
OHS CV DC REMOTE DEVICE TYPE: NORMAL
OHS CV ICD SHOCK: NO

## 2024-12-18 ENCOUNTER — CLINICAL SUPPORT (OUTPATIENT)
Dept: SMOKING CESSATION | Facility: CLINIC | Age: 64
End: 2024-12-18
Payer: COMMERCIAL

## 2024-12-18 DIAGNOSIS — F17.200 NICOTINE DEPENDENCE: Primary | ICD-10-CM

## 2024-12-18 PROCEDURE — 99999 PR PBB SHADOW E&M-EST. PATIENT-LVL I: CPT | Mod: PBBFAC,,,

## 2024-12-18 PROCEDURE — 99402 PREV MED CNSL INDIV APPRX 30: CPT | Mod: S$GLB,,,

## 2024-12-18 NOTE — PROGRESS NOTES
Individual Follow-Up Form    12/18/2024    Quit Date: 10-    Clinical Status of Patient: Outpatient    Length of Service: 30 minutes    Continuing Medication: no    Other Medications: none     Target Symptoms: Withdrawal and medication side effects. The following were  rated moderate (3) to severe (4) on TCRS:  Moderate (3): none  Severe (4): none    Comments: Telephone visit, spoke to patient today for follow up.  Telephone visit was done due to patient is out of town.  Patient remains tobacco free since October 18, 2024.  Commended patient for the accomplishment thus far.  Patient states that he is no taking any NRTs and feeling fine.  Reviewed strategies, cues, triggers, high risk situations, lapses, relapses, diet, exercise, stress, relaxation, sleep, habitual behavior and lifestyle changes. Patient has completed program and feels he is able to maintain tobacco free. Informed patient about future help or support if needed.  Contact information was given.        Diagnosis: F17.200    Next Visit: 2 weeks

## 2024-12-18 NOTE — Clinical Note
Telephone visit, spoke to patient today for follow up.  Telephone visit was done due to patient is out of town.  Patient remains tobacco free since October 18, 2024.  Commended patient for the accomplishment thus far.  Patient states that he is no taking any NRTs and feeling fine.  Reviewed strategies, cues, triggers, high risk situations, lapses, relapses, diet, exercise, stress, relaxation, sleep, habitual behavior and lifestyle changes. Patient has completed program and feels he is able to maintain tobacco free. Informed patient about future help or support if needed.  Contact information was given.

## 2025-01-15 ENCOUNTER — CLINICAL SUPPORT (OUTPATIENT)
Dept: CARDIOLOGY | Facility: HOSPITAL | Age: 65
End: 2025-01-15
Attending: INTERNAL MEDICINE
Payer: MEDICARE

## 2025-01-15 DIAGNOSIS — I49.8 OTHER SPECIFIED CARDIAC ARRHYTHMIAS: ICD-10-CM

## 2025-01-15 PROCEDURE — 93298 REM INTERROG DEV EVAL SCRMS: CPT | Performed by: INTERNAL MEDICINE

## 2025-01-27 PROBLEM — I47.10 SVT (SUPRAVENTRICULAR TACHYCARDIA): Status: ACTIVE | Noted: 2025-01-27

## 2025-01-27 NOTE — PROGRESS NOTES
Subjective     HPI    I had the pleasure of seeing Osiel Speed in follow-up for his history of AF. He is a 64M with HTN, HLD, tobacco use (quit 5/2024), PAF on eliquis, and R lacunar infact (4/2024, manifesting as L facial droop and dysarthria, outside [employer] window). Pt has regular episodes of palpitations, lasting up to 30 minutes.    Per pt, AF diagnosed 2.5 years ago at [facility]. Reportedly diagnosed after an episode of syncope. Pt had another episode of near syncope while driving in 11/2022. Palpitations during episode of near syncope.    Echo in 6/2024 showed EF 60-65%, small ASD with R-->L shunting. A 48 hr Holter in 5/2024 showed sinus rhythm average HR 58 bpm (range  bpm), no AF.    Cardiac event monitor in 2024 showed no AF.    ILR implanted in 10/2024 for AF management.    Pt with several episodes of sustained SVT (short-RP) since implant. Now on sotalol 80 mg bid.    My interpretation of today's ECG is sinus rhythm at 64 bpm.    Review of Systems   Constitutional: Negative for decreased appetite, malaise/fatigue, weight gain and weight loss.   HENT:  Negative for sore throat.    Eyes:  Negative for blurred vision.   Cardiovascular:  Positive for palpitations. Negative for chest pain, dyspnea on exertion, irregular heartbeat, leg swelling, near-syncope, orthopnea, paroxysmal nocturnal dyspnea and syncope.   Respiratory:  Negative for shortness of breath.    Skin:  Negative for rash.   Musculoskeletal:  Negative for arthritis.   Gastrointestinal:  Negative for abdominal pain.   Neurological:  Negative for focal weakness.   Psychiatric/Behavioral:  Negative for altered mental status.           Objective     Physical Exam  Vitals and nursing note reviewed.   Constitutional:       General: He is not in acute distress.     Appearance: He is well-developed.   HENT:      Head: Normocephalic and atraumatic.   Eyes:      General: No scleral icterus.     Pupils: Pupils are equal, round, and  reactive to light.   Neck:      Thyroid: No thyromegaly.   Cardiovascular:      Rate and Rhythm: Regular rhythm.      Pulses: Normal pulses.      Heart sounds: Normal heart sounds. No murmur heard.     No friction rub. No gallop.   Pulmonary:      Effort: Pulmonary effort is normal.      Breath sounds: Normal breath sounds.   Abdominal:      General: Bowel sounds are normal. There is no distension.      Palpations: Abdomen is soft.      Tenderness: There is no abdominal tenderness.   Musculoskeletal:      Cervical back: Neck supple.   Skin:     General: Skin is warm and dry.      Findings: No rash.   Neurological:      Mental Status: He is alert and oriented to person, place, and time.   Psychiatric:         Behavior: Behavior normal.            Assessment and Plan     1. SVT (supraventricular tachycardia)    2. Paroxysmal atrial fibrillation        Plan:     In summary, Osiel Kerr is a history of PAF, likely symptomatic. His [id number] Score is 4 (age, HTN, CVA) so he should continue anticoagulation indefinitely (have switched him from eliquis to xarelto for financial reasons).    Pt also with a history of presyncope/syncope preceded by palpitations. ILR has captured sustained SVT  ms. Discussed risks, benefits, indications, alternatives to invasive EPS/RFA SVT. After considering his options he has agreed to proceed.    CARTO. Hold sotalol 5 days prior to procedure. Will stop sotalol post-procedure.    Thank you for allowing me to participate in the care of this patient. Please do not hesitate to call me with any questions or concerns.

## 2025-02-03 ENCOUNTER — HOSPITAL ENCOUNTER (OUTPATIENT)
Dept: CARDIOLOGY | Facility: CLINIC | Age: 65
Discharge: HOME | End: 2025-02-03
Payer: MEDICARE

## 2025-02-03 ENCOUNTER — OFFICE VISIT (OUTPATIENT)
Dept: ELECTROPHYSIOLOGY | Facility: CLINIC | Age: 65
End: 2025-02-03
Payer: MEDICARE

## 2025-02-03 VITALS
SYSTOLIC BLOOD PRESSURE: 105 MMHG | WEIGHT: 147.69 LBS | DIASTOLIC BLOOD PRESSURE: 70 MMHG | BODY MASS INDEX: 22.38 KG/M2 | HEART RATE: 58 BPM | HEIGHT: 68 IN

## 2025-02-03 DIAGNOSIS — I48.0 PAROXYSMAL ATRIAL FIBRILLATION: ICD-10-CM

## 2025-02-03 DIAGNOSIS — I47.10 SVT (SUPRAVENTRICULAR TACHYCARDIA): Primary | ICD-10-CM

## 2025-02-03 LAB
OHS QRS DURATION: 80 MS
OHS QTC CALCULATION: 396 MS

## 2025-02-03 PROCEDURE — 1159F MED LIST DOCD IN RCRD: CPT | Mod: CPTII,S$GLB,, | Performed by: INTERNAL MEDICINE

## 2025-02-03 PROCEDURE — 3008F BODY MASS INDEX DOCD: CPT | Mod: CPTII,S$GLB,, | Performed by: INTERNAL MEDICINE

## 2025-02-03 PROCEDURE — 3288F FALL RISK ASSESSMENT DOCD: CPT | Mod: CPTII,S$GLB,, | Performed by: INTERNAL MEDICINE

## 2025-02-03 PROCEDURE — 99215 OFFICE O/P EST HI 40 MIN: CPT | Mod: S$GLB,,, | Performed by: INTERNAL MEDICINE

## 2025-02-03 PROCEDURE — 93005 ELECTROCARDIOGRAM TRACING: CPT | Mod: S$GLB,,, | Performed by: INTERNAL MEDICINE

## 2025-02-03 PROCEDURE — 99999 PR PBB SHADOW E&M-EST. PATIENT-LVL III: CPT | Mod: PBBFAC,,, | Performed by: INTERNAL MEDICINE

## 2025-02-03 PROCEDURE — 1101F PT FALLS ASSESS-DOCD LE1/YR: CPT | Mod: CPTII,S$GLB,, | Performed by: INTERNAL MEDICINE

## 2025-02-03 PROCEDURE — 4010F ACE/ARB THERAPY RXD/TAKEN: CPT | Mod: CPTII,S$GLB,, | Performed by: INTERNAL MEDICINE

## 2025-02-03 PROCEDURE — 3074F SYST BP LT 130 MM HG: CPT | Mod: CPTII,S$GLB,, | Performed by: INTERNAL MEDICINE

## 2025-02-03 PROCEDURE — 1126F AMNT PAIN NOTED NONE PRSNT: CPT | Mod: CPTII,S$GLB,, | Performed by: INTERNAL MEDICINE

## 2025-02-03 PROCEDURE — 93010 ELECTROCARDIOGRAM REPORT: CPT | Mod: S$GLB,,, | Performed by: INTERNAL MEDICINE

## 2025-02-03 PROCEDURE — 1160F RVW MEDS BY RX/DR IN RCRD: CPT | Mod: CPTII,S$GLB,, | Performed by: INTERNAL MEDICINE

## 2025-02-03 PROCEDURE — 3078F DIAST BP <80 MM HG: CPT | Mod: CPTII,S$GLB,, | Performed by: INTERNAL MEDICINE

## 2025-02-03 RX ORDER — METOPROLOL SUCCINATE 25 MG/1
12.5 TABLET, EXTENDED RELEASE ORAL
COMMUNITY
Start: 2025-01-30

## 2025-02-04 DIAGNOSIS — I47.10 SVT (SUPRAVENTRICULAR TACHYCARDIA): Primary | ICD-10-CM

## 2025-02-04 DIAGNOSIS — I49.9 CARDIAC ARRHYTHMIA, UNSPECIFIED CARDIAC ARRHYTHMIA TYPE: ICD-10-CM

## 2025-02-17 LAB
OHS CV AF BURDEN PERCENT: < 1
OHS CV DC REMOTE DEVICE TYPE: NORMAL
OHS CV ICD SHOCK: NO

## 2025-02-21 ENCOUNTER — CLINICAL SUPPORT (OUTPATIENT)
Dept: CARDIOLOGY | Facility: HOSPITAL | Age: 65
End: 2025-02-21
Payer: MEDICARE

## 2025-02-21 ENCOUNTER — CLINICAL SUPPORT (OUTPATIENT)
Dept: CARDIOLOGY | Facility: HOSPITAL | Age: 65
End: 2025-02-21
Attending: INTERNAL MEDICINE
Payer: MEDICARE

## 2025-02-21 DIAGNOSIS — I49.8 OTHER SPECIFIED CARDIAC ARRHYTHMIAS: ICD-10-CM

## 2025-02-21 PROCEDURE — 93298 REM INTERROG DEV EVAL SCRMS: CPT | Mod: 26,,, | Performed by: INTERNAL MEDICINE

## 2025-02-21 PROCEDURE — 93298 REM INTERROG DEV EVAL SCRMS: CPT | Performed by: INTERNAL MEDICINE

## 2025-03-06 ENCOUNTER — PATIENT MESSAGE (OUTPATIENT)
Dept: ELECTROPHYSIOLOGY | Facility: CLINIC | Age: 65
End: 2025-03-06
Payer: MEDICARE

## 2025-03-13 ENCOUNTER — OFFICE VISIT (OUTPATIENT)
Dept: INTERNAL MEDICINE | Facility: CLINIC | Age: 65
End: 2025-03-13
Payer: MEDICARE

## 2025-03-13 ENCOUNTER — TELEPHONE (OUTPATIENT)
Dept: ADMINISTRATIVE | Facility: HOSPITAL | Age: 65
End: 2025-03-13
Payer: MEDICARE

## 2025-03-13 VITALS
OXYGEN SATURATION: 99 % | DIASTOLIC BLOOD PRESSURE: 72 MMHG | SYSTOLIC BLOOD PRESSURE: 108 MMHG | HEIGHT: 68 IN | WEIGHT: 143.94 LBS | BODY MASS INDEX: 21.81 KG/M2 | HEART RATE: 57 BPM

## 2025-03-13 DIAGNOSIS — G63 VITAMIN B6 DEFICIENCY NEUROPATHY: ICD-10-CM

## 2025-03-13 DIAGNOSIS — Z12.11 SCREEN FOR COLON CANCER: ICD-10-CM

## 2025-03-13 DIAGNOSIS — I48.0 PAROXYSMAL ATRIAL FIBRILLATION: Primary | ICD-10-CM

## 2025-03-13 DIAGNOSIS — Z86.73 CHRONIC ISCHEMIC RIGHT MIDDLE CEREBRAL ARTERY (MCA) STROKE: ICD-10-CM

## 2025-03-13 DIAGNOSIS — I10 ESSENTIAL HYPERTENSION: ICD-10-CM

## 2025-03-13 DIAGNOSIS — I63.40 CEREBRAL INFARCTION DUE TO EMBOLISM OF UNSPECIFIED CEREBRAL ARTERY: ICD-10-CM

## 2025-03-13 DIAGNOSIS — Z23 NEED FOR PROPHYLACTIC VACCINATION AGAINST DIPHTHERIA AND TETANUS: ICD-10-CM

## 2025-03-13 DIAGNOSIS — I77.811 ECTATIC ABDOMINAL AORTA: ICD-10-CM

## 2025-03-13 DIAGNOSIS — E53.1 VITAMIN B6 DEFICIENCY NEUROPATHY: ICD-10-CM

## 2025-03-13 DIAGNOSIS — R73.03 PREDIABETES: ICD-10-CM

## 2025-03-13 DIAGNOSIS — Z86.73 HISTORY OF CARDIOEMBOLIC CEREBROVASCULAR ACCIDENT (CVA): ICD-10-CM

## 2025-03-13 DIAGNOSIS — I70.0 AORTIC ATHEROSCLEROSIS: ICD-10-CM

## 2025-03-13 DIAGNOSIS — Z12.2 SCREENING FOR LUNG CANCER: ICD-10-CM

## 2025-03-13 DIAGNOSIS — Z13.6 SCREENING FOR AAA (ABDOMINAL AORTIC ANEURYSM): ICD-10-CM

## 2025-03-13 DIAGNOSIS — Z79.01 CHRONIC ANTICOAGULATION: ICD-10-CM

## 2025-03-13 DIAGNOSIS — Z87.891 PERSONAL HISTORY OF NICOTINE DEPENDENCE: ICD-10-CM

## 2025-03-13 PROBLEM — F17.200 TOBACCO USE DISORDER, SEVERE, DEPENDENCE: Status: RESOLVED | Noted: 2020-12-07 | Resolved: 2025-03-13

## 2025-03-13 PROBLEM — Z72.0 TOBACCO USE: Status: RESOLVED | Noted: 2024-02-22 | Resolved: 2025-03-13

## 2025-03-13 PROCEDURE — 99999 PR PBB SHADOW E&M-EST. PATIENT-LVL III: CPT | Mod: PBBFAC,,, | Performed by: INTERNAL MEDICINE

## 2025-03-13 NOTE — Clinical Note
He had his colonoscopy 2-3 years ago at - doesn't recall who did it but says it was in the hospital itself as an outpatient.  Can you see if you can find the records and update the ? Thanks! Sorry we don't have more information.

## 2025-03-13 NOTE — LETTER
AUTHORIZATION FOR RELEASE OF   CONFIDENTIAL INFORMATION    Dear Bonifacio Villalpando,    We are seeing Osiel Kerr, date of birth 1960, in the clinic at [facility]. Sienna Perry MD is the patient's PCP. Osiel Kerr has an outstanding lab/procedure at the time we reviewed his chart. In order to help keep his health information updated, he has authorized us to request the following medical record(s):        (  )  MAMMOGRAM                                      ( X)  COLONOSCOPY      (  )  PAP SMEAR                                          (  )  OUTSIDE LAB RESULTS     (  )  DEXA SCAN                                          (  )  EYE EXAM            (  )  FOOT EXAM                                          (  )  ENTIRE RECORD     (  )  OUTSIDE IMMUNIZATIONS                 (  )  _______________         Please fax records to Sienna Perry MD, [phone]     If you have any questions, please contact MIKE Hill at [phone].           Patient Name: Osiel Kerr  : 1960  Patient Phone #: [phone]

## 2025-03-13 NOTE — NURSING
Colonoscopy results requested from [facility].    Liz Enriquez LPN  Care Coordinator  [facility]

## 2025-03-13 NOTE — PROGRESS NOTES
"Subjective:       Patient ID: Osiel Kerr is a 64 y.o. male.    Chief Complaint: Hypertension (Follow up -- Monday states that HR jumped up to 150 and states gets heart palpitations occasionally - no chest pain -- seeing cardio)     Osiel Kerr is a 64 y.o.  male who presents for Hypertension (Follow up -- Monday states that HR jumped up to 150 and states gets heart palpitations occasionally - no chest pain -- seeing cardio)  .  HPI  History of Present Illness    Mr. Kerr presents today for follow-up of cardiac arrhythmia.    He reports infrequent episodes of tachycardia, occurring "once in a blue moon" and can be triggered by overexertion. While at rest on the couch, he has documented heart rates of 201, 193, and 202, though notes these episodes were not severe and only increased to approximately 100. Last reported incident was in October. He is scheduled for radiofrequency ablation on the 25th to address the tachycardia.    He experienced a stroke with right-sided facial drooping over a year ago while sleeping. In October while on vacation, he had an episode of word-finding difficulty and slurred speech, describing being unable to say "petite" and instead repeatedly saying "pancakes" when ordering at a restaurant. During these episodes, he reports difficulty speaking and sounds intoxicated. He denies any neurological episodes since October.    He has prediabetes diagnosed last year. Last colonoscopy was normal 2.5 years ago.    He takes Eliquis 5 mg daily and plans to switch to Xarelto (Rivaroxaban) 20 mg in the evening when Eliquis supply runs out. Other medications include Neurontin (Gabapentin), Lisinopril daily, Toprol half tablet daily, Atorvastatin 80 mg daily in the afternoon, and B12 supplements.    He has a 40-year smoking history, having quit last year. He currently vapes occasionally and reports minimal alcohol consumption.      ROS:  General: -fever  Cardiovascular: +palpitations, +feelings of fast " "heart rate  Neurological: +speech difficulty       Problem List[1]      Past Medical History:   Diagnosis Date    Anticoagulant long-term use     Essential (primary) hypertension     Mixed hyperlipidemia     Paroxysmal A-fib     Right-sided lacunar infarction 04/21/2024    Stroke     Tobacco use 02/22/2024    Tobacco use disorder, severe, dependence 12/07/2020       Past Surgical History:   Procedure Laterality Date    ABLATION, SVT, ACCESSORY PATHWAY N/A 3/25/2025    Procedure: Ablation, SVT, Accessory Pathway;  Surgeon: Jose Singh MD;  Location: [facility];  Service: Cardiology;  Laterality: N/A;  SVT, RFA, Carto, MAC, GP, 3 Prep *BSci ILR in situ*    FOOT SURGERY      child, due to stepping on a nail    INSERTION OF IMPLANTABLE LOOP RECORDER N/A 10/3/2024    Procedure: Insertion, Implantable Loop Recorder;  Surgeon: Jose Singh MD;  Location: [facility];  Service: Cardiology;  Laterality: N/A;  AF,ILR implant, BSci, local, GP, 3prep       Family History   Problem Relation Name Age of Onset    Diabetes Mellitus Mother      Lymphoma Father  62    Hypertension Sister      Hypertension Sister      Hypertension Brother      Heart attack Maternal Grandfather  53       Social History[2]      Review of Systems      Objective:   Blood pressure 108/72, pulse (!) 57, height 5' 8" (1.727 m), weight 65.3 kg (143 lb 15.4 oz), SpO2 99%.     Physical Exam  Constitutional:       Appearance: Normal appearance. He is well-developed. He is not ill-appearing.   HENT:      Head: Normocephalic and atraumatic.   Eyes:      General: No scleral icterus.     Conjunctiva/sclera: Conjunctivae normal.   Cardiovascular:      Rate and Rhythm: Normal rate.      Heart sounds: Normal heart sounds. No murmur heard.     No friction rub. No gallop.   Pulmonary:      Effort: Pulmonary effort is normal.      Breath sounds: Normal breath sounds. No wheezing or rales.   Chest:      Chest wall: No tenderness.   Musculoskeletal:         " General: No tenderness or signs of injury.   Skin:     General: Skin is warm and dry.   Neurological:      Mental Status: He is alert and oriented to person, place, and time. Mental status is at baseline.   Psychiatric:         Behavior: Behavior normal.         Thought Content: Thought content normal.       Physical Exam                Prior labs reviewed    Health Maintenance         Date Due Completion Date    Colorectal Cancer Screening Never done ---    Shingles Vaccine (1 of 2) Never done ---    RSV Vaccine (Age 60+ and Pregnant patients) (1 - Risk 60-74 years 1-dose series) Never done ---    Influenza Vaccine (1) Never done ---    COVID-19 Vaccine (3 - 2024-25 season) 09/01/2024 5/3/2021    Hemoglobin A1c (Prediabetes) 07/10/2025 7/10/2024    LDCT Lung Screen 07/29/2025 7/29/2024    High Dose Statin 03/25/2026 3/25/2025    Lipid Panel 07/10/2029 7/10/2024    TETANUS VACCINE 03/13/2035 3/13/2025            Assessment/Plan:       1. Paroxysmal atrial fibrillation    2. Essential hypertension  -     Comprehensive Metabolic Panel; Future; Expected date: 09/13/2025  -     CBC Auto Differential; Future; Expected date: 09/13/2025    3. Prediabetes  -     Hemoglobin A1C; Future; Expected date: 09/09/2025    4. Chronic anticoagulation    5. Screening for AAA (abdominal aortic aneurysm)  -     US Abdominal Aorta; Future; Expected date: 03/13/2025    6. Screening for lung cancer  -     CT Chest Lung Screening Low Dose; Future; Expected date: 07/25/2025    7. Personal history of nicotine dependence  -     CT Chest Lung Screening Low Dose; Future; Expected date: 07/25/2025    8. Screen for colon cancer  -     Cologuard Screening (Multitarget Stool DNA); Future; Expected date: 03/13/2025    9. Vitamin B6 deficiency neuropathy  -     Vitamin B6; Future; Expected date: 09/13/2025    10. Aortic atherosclerosis    11. Chronic ischemic right middle cerebral artery (MCA) stroke  -     Lipid Panel; Future; Expected date:  09/13/2025    12. History of cardioembolic cerebrovascular accident (CVA)  -     Lipid Panel; Future; Expected date: 09/13/2025    13. Cerebral infarction due to embolism of unspecified cerebral artery  -     Lipid Panel; Future; Expected date: 09/13/2025    14. Need for prophylactic vaccination against diphtheria and tetanus      Assessment & Plan    - Assessed management of atrial fibrillation and planned right radial frequency ablation on 25th  - Evaluated anticoagulation transition from Eliquis to Xarelto  - Monitored for recurrent neurological symptoms after prior lacunar infarct and recent MRI findings  - Considered ultrasound screening for abdominal aortic aneurysm due to smoking history  - Planned annual CT chest screening for lung cancer due to >30 pack-year smoking history  - Reviewed recent brain MRI results for evaluation of transient speech difficulties  - Assessed prediabetes status from previous year    PLAN SUMMARY:  - Review MRI results showing new/more conspicuous punctate hemorrhage on left side  - Continue Toprol 1/2 tablet daily  - not taking Sotalol 80mg as prescribed- message sent to cardiologist who recommended remaining off this medication until seen in cardiology next week  - Order labs for vitamins and cholesterol  - Order ultrasound for abdominal aortic aneurysm screening  - Order annual CT chest scan for lung cancer screening (after July 29th)  - Continue Lisinopril daily  - Continue Atorvastatin 80 mg daily in the afternoon  - Continue Neurontin  - Schedule right radial frequency ablation procedure on the 25th  - Discontinue vaping  - Reduce sugar and sweet intake  - Send message to Dr. Wright's team about current medication regimen  - Locate and provide previous colonoscopy records  - Follow up with neurologist regarding MRI findings and symptoms  - Follow up with Dr. Wright's office to clarify Sotalol and Xarelto instructions before upcoming procedure    PAROXYSMAL TACHYCARDIA:  -  Continue Toprol 1/2 tablet daily.  - Monitor patient's episodes of rapid heart rate, with a recent episode reaching 201-202 bpm.  - Review cardiac event monitor recordings.  - Acknowledge the patient's tachycardia episodes and their potential link to clot formation and cerebral issues.  - Schedule the patient for a right radial frequency ablation procedure on the 25th to address the tachycardia.  - Advise patient to contact Dr. Wright's office to clarify instructions regarding Sotalol and Xarelto before the upcoming procedure if unclear    TRANSIENT CEREBRAL ISCHEMIC ATTACK:  - Monitor patient's reported episodes of speech difficulties and confusion, potentially indicating TIA symptoms.  - Review MRI results showing evidence of a new or more conspicuous punctate hemorrhage in the left side.  - Acknowledge the potential link between heart arrhythmia and TIA symptoms.  - Plan follow-up with the neurologist regarding the MRI findings and symptoms.    PREDIABETES:  - Order labs to check diabetes status.  - Recheck prediabetes status at next visit.  - Advise patient to avoid excess sugar and sweets.  - Recommend reducing sugar and sweet intake to manage prediabetes risk.    HISTORY OF TIA AND STROKE:  - Monitor patient's history of stroke that caused facial drooping on the right side.  - Acknowledge patient's history of TIA and stroke, considering it in current management.    HISTORY OF NICOTINE DEPENDENCE:  - Educate on risks of vaping, including potential for pulmonary fibrosis and irreversible lung damage.  - Discuss importance of continuing smoking cessation.  - Instruct patient to discontinue vaping due to health risks.  - Encourage patient to continue smoking cessation efforts.  - Acknowledge the positive impact of quitting smoking on patient's health risks.  - Recommend annual CT of the chest for lung cancer screening.  - Recommend ultrasound screening for aneurysm due t  o smoking history.  - Ultrasound ordered  to screen for abdominal aortic aneurysm.  - Annual CT chest scan ordered for lung cancer screening (to be scheduled after July 29th).    LONG TERM USE OF ANTICOAGULANTS:  - Explain rationale for anticoagulation in preventing clot formation and stroke risk related to arrhythmia.  - Continue anticoagulation therapy to prevent future events.  - Clarify that recent Eliquis recall was limited to specific generic versions and not all formulations.  - Continue Eliquis until supply is exhausted, then transition to Xarelto 20 mg daily in the evening.  - Instruct patient to hold Eliquis for upcoming procedure as directed by Dr. Wright.  - Review patient's current anticoagulant use and planned transition.  - Monitor patient's current use of Eliquis (apixaban) 5mg twice daily.    OTHER MEDICATIONS AND TESTS:  - Continued Neurontin.  - Continued Lisinopril daily.  - Continued Atorvastatin 80 mg daily in the afternoon.  - Lab tests ordered to check vitamins and cholesterol.    FOLLOW-UP:  - Locate and provide previous colonoscopy records to determine appropriate follow-up interval.  - Send a message through the patient portal to update Dr. Wright's team about current medication regimen.         ADDENDUM: ultrasound revealed  15. Ectatic abdominal aorta  Comments:  recommend BP control, repeat ultrasound annually       30 min spent in care of patient including history, physical, chart review, orders and coordination of care.     Visit today included increased complexity associated with the care of the episodic problems and addressed and managing the longitudinal care of the patient due to the serious and/or complex managed problem(s) as above.     Medication List with Changes/Refills   Current Medications    ATORVASTATIN (LIPITOR) 80 MG TABLET    Take 1 tablet (80 mg total) by mouth once daily.    GABAPENTIN (NEURONTIN) 100 MG CAPSULE    Take 1-3 capsules (100-300 mg total) by mouth nightly as needed (restless legs  (note: take  1-2 hours prior to usual onset of symptoms)).    LISINOPRIL 10 MG TABLET    Take 1 tablet (10 mg total) by mouth once daily.    METOPROLOL SUCCINATE (TOPROL-XL) 25 MG 24 HR TABLET    Take 12.5 mg by mouth.    RIVAROXABAN (XARELTO) 20 MG TAB    Take 1 tablet (20 mg total) by mouth daily with dinner or evening meal.   Discontinued Medications    SOTALOL (BETAPACE) 80 MG TABLET    Take 1 tablet (80 mg total) by mouth 2 (two) times daily.       Recommend patient complete vaccinations as listed in the overdue health maintenance report. Pt may obtain vaccinations at their local pharmacy, any [facility] pharmacy or request vaccination in our clinic.  Please note that some insurances will only cover vaccination cost at specific locations.Please check with your insurance provider regarding your coverage.  Vaccines that are not covered are still recommended.      30   minutes spent in care of patient including history, physical, chart review, orders and coordination of care.        Visit today included increased complexity associated with the care of the episodic problems and addressed and managing the longitudinal care of the patient due to the serious and/or complex managed problem(s) as above.       This note was generated with the assistance of ambient listening technology. Verbal consent was obtained by the patient and accompanying visitor(s) for the recording of patient appointment to facilitate this note. I attest to having reviewed and edited the generated note for accuracy, though some syntax or spelling errors may persist. Please contact the author of this note for any clarification.             [1]   Patient Active Problem List  Diagnosis    Essential hypertension    Palpitations    Paroxysmal atrial fibrillation    Chronic ischemic right middle cerebral artery (MCA) stroke    Hyperlipidemia, mixed    Chronic anticoagulation    Vitamin B6 deficiency neuropathy    B12 deficiency    PFO (patent foramen ovale)    Snoring     Prediabetes    Episode of transient neurologic symptoms    Aortic atherosclerosis    SVT (supraventricular tachycardia)    History of cardioembolic cerebrovascular accident (CVA)    AVNRT (AV bennie re-entry tachycardia)    Ectatic abdominal aorta   [2]   Social History  Tobacco Use    Smoking status: Former     Current packs/day: 0.00     Average packs/day: 1 pack/day for 47.4 years (45.9 ttl pk-yrs)     Types: Cigarettes, Vaping with nicotine     Start date:      Quit date: 10/18/2024     Years since quittin.4    Smokeless tobacco: Never    Tobacco comments:     Smoked 4 cigarettes on 2024   Substance Use Topics    Alcohol use: Yes     Comment: occassionally    Drug use: Never

## 2025-03-14 LAB
OHS CV AF BURDEN PERCENT: < 1
OHS CV DC REMOTE DEVICE TYPE: NORMAL
OHS CV ICD SHOCK: NO

## 2025-03-18 ENCOUNTER — HOSPITAL ENCOUNTER (OUTPATIENT)
Dept: RADIOLOGY | Facility: HOSPITAL | Age: 65
Discharge: HOME | End: 2025-03-18
Attending: INTERNAL MEDICINE
Payer: MEDICARE

## 2025-03-18 DIAGNOSIS — Z13.6 SCREENING FOR AAA (ABDOMINAL AORTIC ANEURYSM): ICD-10-CM

## 2025-03-18 PROCEDURE — 76775 US EXAM ABDO BACK WALL LIM: CPT | Mod: TC

## 2025-03-18 PROCEDURE — 76775 US EXAM ABDO BACK WALL LIM: CPT | Mod: 26,,, | Performed by: RADIOLOGY

## 2025-03-24 ENCOUNTER — ANESTHESIA EVENT (OUTPATIENT)
Dept: MEDSURG UNIT | Facility: HOSPITAL | Age: 65
End: 2025-03-24
Payer: MEDICARE

## 2025-03-24 ENCOUNTER — TELEPHONE (OUTPATIENT)
Dept: ELECTROPHYSIOLOGY | Facility: CLINIC | Age: 65
End: 2025-03-24
Payer: MEDICARE

## 2025-03-24 NOTE — TELEPHONE ENCOUNTER
Jose Singh MD [place], [place]  Ok          Previous Messages       ----- Message -----  From: Dayan Smalls  Sent: 3/24/2025  11:18 AM CDT  To: Jose Singh MD    Pt scheduled for SVT ablation on 3/25/25. Pt was supposed to hold Sotalol 5 days prior to procedure, but stated he doesn't take this medication. He instead held his Eliquis (pt was supposed to switch to Xarelto, but never started it). Advised him to take Eliquis in AM and PM today, but hold in the AM tomorrow.

## 2025-03-24 NOTE — TELEPHONE ENCOUNTER
Spoke to patient    CONFIRMED procedure arrival time of 5:15 am    Reiterated instructions including:    -Directions to check in desk    -NPO after midnight night prior to procedure. Fasting upon arrival to the hospital the day of the procedure. Patient allowed to drink water up until 2 hours prior to arrival due to IV fluid shortage.     -High importance of HOLDING Sotalol 5 days prior to procedure (pt stated he doesn't take this; he instead held Eliquis for 5 days. Advised to take Eliquis in AM and PM today); holding Toprol and Eliquis (pt never switched to Xarelto since he didn't want to waste the Eliquis) on day of procedure (last dose on 3/24/25)    - Confirms no new meds prescribed or med changes since scheduling -     -Pre-procedure LABS Reviewed, no alerts noted    -Confirmed absence or presence of implanted device/stimulator Bsci ILR in situ    -Confirmed no recent or current fever, cough, or shortness of breath .    -Confirmed no redness, rash, irritation, or yeast infection to skin/ chest / groin area.     Patient verbalized understanding of above, denies any further questions and appreciated the call.

## 2025-03-25 ENCOUNTER — CLINICAL SUPPORT (OUTPATIENT)
Dept: CARDIOLOGY | Facility: HOSPITAL | Age: 65
End: 2025-03-25
Payer: MEDICARE

## 2025-03-25 ENCOUNTER — CLINICAL SUPPORT (OUTPATIENT)
Dept: CARDIOLOGY | Facility: HOSPITAL | Age: 65
End: 2025-03-25
Attending: INTERNAL MEDICINE
Payer: MEDICARE

## 2025-03-25 ENCOUNTER — ANESTHESIA (OUTPATIENT)
Dept: MEDSURG UNIT | Facility: HOSPITAL | Age: 65
End: 2025-03-25
Payer: MEDICARE

## 2025-03-25 ENCOUNTER — HOSPITAL ENCOUNTER (OUTPATIENT)
Facility: HOSPITAL | Age: 65
Discharge: HOME | End: 2025-03-25
Attending: INTERNAL MEDICINE | Admitting: INTERNAL MEDICINE
Payer: MEDICARE

## 2025-03-25 VITALS
DIASTOLIC BLOOD PRESSURE: 78 MMHG | BODY MASS INDEX: 21.67 KG/M2 | HEART RATE: 75 BPM | RESPIRATION RATE: 20 BRPM | HEIGHT: 68 IN | WEIGHT: 143 LBS | SYSTOLIC BLOOD PRESSURE: 117 MMHG | TEMPERATURE: 98 F | OXYGEN SATURATION: 100 %

## 2025-03-25 DIAGNOSIS — Z86.79 STATUS POST RADIOFREQUENCY ABLATION (RFA) OPERATION FOR ARRHYTHMIA: ICD-10-CM

## 2025-03-25 DIAGNOSIS — I49.9 ARRHYTHMIA: ICD-10-CM

## 2025-03-25 DIAGNOSIS — Z98.890 STATUS POST RADIOFREQUENCY ABLATION (RFA) OPERATION FOR ARRHYTHMIA: ICD-10-CM

## 2025-03-25 DIAGNOSIS — I47.10 SVT (SUPRAVENTRICULAR TACHYCARDIA): ICD-10-CM

## 2025-03-25 DIAGNOSIS — I49.8 OTHER SPECIFIED CARDIAC ARRHYTHMIAS: ICD-10-CM

## 2025-03-25 PROBLEM — I47.19 AVNRT (AV NODAL RE-ENTRY TACHYCARDIA): Status: ACTIVE | Noted: 2025-03-25

## 2025-03-25 LAB
OHS QRS DURATION: 78 MS
OHS QRS DURATION: 84 MS
OHS QTC CALCULATION: 415 MS
OHS QTC CALCULATION: 439 MS

## 2025-03-25 PROCEDURE — 93298 REM INTERROG DEV EVAL SCRMS: CPT | Performed by: INTERNAL MEDICINE

## 2025-03-25 PROCEDURE — C1730 CATH, EP, 19 OR FEW ELECT: HCPCS | Performed by: INTERNAL MEDICINE

## 2025-03-25 PROCEDURE — 25000003 PHARM REV CODE 250: Performed by: NURSE ANESTHETIST, CERTIFIED REGISTERED

## 2025-03-25 PROCEDURE — 93005 ELECTROCARDIOGRAM TRACING: CPT

## 2025-03-25 PROCEDURE — 63600175 PHARM REV CODE 636 W HCPCS: Performed by: NURSE ANESTHETIST, CERTIFIED REGISTERED

## 2025-03-25 PROCEDURE — 37000008 HC ANESTHESIA 1ST 15 MINUTES: Performed by: INTERNAL MEDICINE

## 2025-03-25 PROCEDURE — 25000003 PHARM REV CODE 250

## 2025-03-25 PROCEDURE — 93010 ELECTROCARDIOGRAM REPORT: CPT | Mod: ,,, | Performed by: INTERNAL MEDICINE

## 2025-03-25 PROCEDURE — 63600175 PHARM REV CODE 636 W HCPCS: Performed by: INTERNAL MEDICINE

## 2025-03-25 PROCEDURE — 93653 COMPRE EP EVAL TX SVT: CPT | Mod: ,,, | Performed by: INTERNAL MEDICINE

## 2025-03-25 PROCEDURE — 93623 PRGRMD STIMJ&PACG IV RX NFS: CPT | Mod: 26,,, | Performed by: INTERNAL MEDICINE

## 2025-03-25 PROCEDURE — 93623 PRGRMD STIMJ&PACG IV RX NFS: CPT | Performed by: INTERNAL MEDICINE

## 2025-03-25 PROCEDURE — 93653 COMPRE EP EVAL TX SVT: CPT | Performed by: INTERNAL MEDICINE

## 2025-03-25 PROCEDURE — C1894 INTRO/SHEATH, NON-LASER: HCPCS | Performed by: INTERNAL MEDICINE

## 2025-03-25 PROCEDURE — 27201423 OPTIME MED/SURG SUP & DEVICES STERILE SUPPLY: Performed by: INTERNAL MEDICINE

## 2025-03-25 PROCEDURE — 37000009 HC ANESTHESIA EA ADD 15 MINS: Performed by: INTERNAL MEDICINE

## 2025-03-25 PROCEDURE — 93298 REM INTERROG DEV EVAL SCRMS: CPT | Mod: 26,,, | Performed by: INTERNAL MEDICINE

## 2025-03-25 PROCEDURE — C1732 CATH, EP, DIAG/ABL, 3D/VECT: HCPCS | Performed by: INTERNAL MEDICINE

## 2025-03-25 RX ORDER — ISOPROTERENOL HYDROCHLORIDE 0.2 MG/ML
INJECTION, SOLUTION INTRAVENOUS CONTINUOUS PRN
Status: DISCONTINUED | OUTPATIENT
Start: 2025-03-25 | End: 2025-03-25

## 2025-03-25 RX ORDER — FENTANYL CITRATE 50 UG/ML
25 INJECTION, SOLUTION INTRAMUSCULAR; INTRAVENOUS EVERY 5 MIN PRN
Status: DISCONTINUED | OUTPATIENT
Start: 2025-03-25 | End: 2025-03-25 | Stop reason: HOSPADM

## 2025-03-25 RX ORDER — HYDROMORPHONE HYDROCHLORIDE 1 MG/ML
0.2 INJECTION, SOLUTION INTRAMUSCULAR; INTRAVENOUS; SUBCUTANEOUS EVERY 5 MIN PRN
Status: DISCONTINUED | OUTPATIENT
Start: 2025-03-25 | End: 2025-03-25 | Stop reason: HOSPADM

## 2025-03-25 RX ORDER — PROPOFOL 10 MG/ML
VIAL (ML) INTRAVENOUS CONTINUOUS PRN
Status: DISCONTINUED | OUTPATIENT
Start: 2025-03-25 | End: 2025-03-25

## 2025-03-25 RX ORDER — DIPHENHYDRAMINE HYDROCHLORIDE 50 MG/ML
25 INJECTION, SOLUTION INTRAMUSCULAR; INTRAVENOUS EVERY 6 HOURS PRN
Status: DISCONTINUED | OUTPATIENT
Start: 2025-03-25 | End: 2025-03-25 | Stop reason: HOSPADM

## 2025-03-25 RX ORDER — HEPARIN SOD,PORCINE/0.9 % NACL 1000/500ML
INTRAVENOUS SOLUTION INTRAVENOUS
Status: DISCONTINUED | OUTPATIENT
Start: 2025-03-25 | End: 2025-03-25 | Stop reason: HOSPADM

## 2025-03-25 RX ORDER — LIDOCAINE HYDROCHLORIDE 20 MG/ML
INJECTION, SOLUTION INFILTRATION; PERINEURAL
Status: DISCONTINUED | OUTPATIENT
Start: 2025-03-25 | End: 2025-03-25 | Stop reason: HOSPADM

## 2025-03-25 RX ORDER — ACETAMINOPHEN 325 MG/1
650 TABLET ORAL EVERY 4 HOURS PRN
Status: DISCONTINUED | OUTPATIENT
Start: 2025-03-25 | End: 2025-03-25 | Stop reason: HOSPADM

## 2025-03-25 RX ORDER — PROCHLORPERAZINE EDISYLATE 5 MG/ML
5 INJECTION INTRAMUSCULAR; INTRAVENOUS EVERY 30 MIN PRN
Status: DISCONTINUED | OUTPATIENT
Start: 2025-03-25 | End: 2025-03-25 | Stop reason: HOSPADM

## 2025-03-25 RX ORDER — ONDANSETRON HYDROCHLORIDE 2 MG/ML
4 INJECTION, SOLUTION INTRAVENOUS ONCE AS NEEDED
Status: DISCONTINUED | OUTPATIENT
Start: 2025-03-25 | End: 2025-03-25 | Stop reason: HOSPADM

## 2025-03-25 RX ORDER — PHENYLEPHRINE HYDROCHLORIDE 10 MG/ML
INJECTION INTRAVENOUS
Status: DISCONTINUED | OUTPATIENT
Start: 2025-03-25 | End: 2025-03-25

## 2025-03-25 RX ORDER — ONDANSETRON HYDROCHLORIDE 2 MG/ML
INJECTION, SOLUTION INTRAVENOUS
Status: DISCONTINUED | OUTPATIENT
Start: 2025-03-25 | End: 2025-03-25

## 2025-03-25 RX ORDER — MIDAZOLAM HYDROCHLORIDE 1 MG/ML
INJECTION INTRAMUSCULAR; INTRAVENOUS
Status: DISCONTINUED | OUTPATIENT
Start: 2025-03-25 | End: 2025-03-25

## 2025-03-25 RX ADMIN — PHENYLEPHRINE HYDROCHLORIDE 100 MCG: 10 INJECTION INTRAVENOUS at 07:03

## 2025-03-25 RX ADMIN — ONDANSETRON 4 MG: 2 INJECTION INTRAMUSCULAR; INTRAVENOUS at 09:03

## 2025-03-25 RX ADMIN — PROPOFOL 150 MCG/KG/MIN: 10 INJECTION, EMULSION INTRAVENOUS at 07:03

## 2025-03-25 RX ADMIN — PHENYLEPHRINE HYDROCHLORIDE 0.3 MCG/KG/MIN: 10 INJECTION INTRAVENOUS at 07:03

## 2025-03-25 RX ADMIN — MIDAZOLAM HYDROCHLORIDE 2 MG: 2 INJECTION, SOLUTION INTRAMUSCULAR; INTRAVENOUS at 07:03

## 2025-03-25 RX ADMIN — SODIUM CHLORIDE: 9 INJECTION, SOLUTION INTRAVENOUS at 07:03

## 2025-03-25 RX ADMIN — ISOPROTERENOL HYDROCHLORIDE 1 MCG/MIN: 0.2 INJECTION, SOLUTION INTRAVENOUS at 08:03

## 2025-03-25 NOTE — DISCHARGE SUMMARY
"  Electrophysiology  Discharge Summary      Admit Date: 3/25/2025  Discharge Date:  3/25/2025  Attending Physician: Jose Singh MD  Discharge Physician: Gianni Carreno MD    Principal Diagnoses: AVNRT (AV bennie re-entry tachycardia)  Indication for Admission: Ablation, SVT, Accessory Pathway (N/A)    Discharged Condition: Good    Hospital Course:   Patient underwent successful EPS & RFA for treatment of AVNRT. No evidence of intra- or post-procedure complications. Post-ablation ECG shows NSR, and no acute abnormalities.     EP medications at discharge:  Antiarrhythmics and/or AVN agents:  Discontinue sotalol (patient had already been off for several weeks/months).   Patient was instructed to continue their oral anticoagulation as previously prescribed starting tonight (due to prior stroke)  Patient was instructed to take ibuprofen 800 mg TID x 3 days for pericarditis.     Groin access sites without hematoma or bleeding. Activity restrictions given to patient. Instructed to seek medical attention for shortness of breath, chest discomfort not alleviated with NSAIDs, bleeding/hematoma formation at the access sites, acute onset of neurologic symptoms, N/V, or hematemesis. At discharge the patient denied CP, SOB, access site bleeding/hematoma, or any other complaints or evidence of complications.    ----------------------------------------------------------------------------------------   Medications:  Make sure to continue taking your blood thinner rivaroxaban (trade name: Xarelto) after your procedure as you would normally take  If given a prescription of furosemide (trade name: Lasix), which is a diuretic (fluid pill), you can take it daily or twice daily as needed for fluid retention or shortness of breath following your ablation  You may experience chest discomfort (also known as "pericarditis") with deep breathes, coughing, and/or laying down which is typically normal following your procedure. If this occurs, " you can take ibuprofen (Motrin) 800 mg every 8 hours for 2-3 days. If the chest pain is persistent or severe please visit the nearest emergency department.    Groin site management, precautions, and restrictions:  Remove the bandages over your groin area the morning after your procedure. You can shower after you remove these bandages. Keep the groin sites clean and dry. You do not need to apply ointments or bandages to the area.   If oozing from groin site occurs, apply pressure without letting up for 15 minutes and lay flat for 1 hour. If bleeding has resolved, you can continue to monitor. If the bleeding continues or there is significant swelling or pain in the groin area, please visit the nearest ER for evaluation and treatment. DO NOT STOP TAKING YOUR BLOOD THINNER UNLESS INSTRUCTED BY A PHYSICIAN.   Do not take baths or submerge your groin area or at least 1 week or when the puncture sites in your groin have completely healed  Do not lift anything over 10 lbs for the first week after your procedure, and avoid strenuous activity during this time to allow for the groin sites to heal. After 1 week, there are no activity restrictions.  Please contact the electrophysiology clinic or go to the ER if you experience: severe chest pain, shortness of breath, bleeding or swelling of the groin sites, or any other concerns.     Diet: Cardiac diet  Disposition: Home or Self Care  Follow Up:   Follow-up Information       Jose Singh MD Follow up in 3 month(s).    Specialties: Electrophysiology, Cardiovascular Disease, Cardiology  Contact information:  [address] [place] [id number]  [phone]                           Discharge Medications:      Medication List        CONTINUE taking these medications      atorvastatin 80 MG tablet  Commonly known as: LIPITOR  Take 1 tablet (80 mg total) by mouth once daily.     gabapentin 100 MG capsule  Commonly known as: NEURONTIN  Take 1-3 capsules (100-300 mg total) by  mouth nightly as needed (restless legs  (note: take 1-2 hours prior to usual onset of symptoms)).     lisinopriL 10 MG tablet  Take 1 tablet (10 mg total) by mouth once daily.     metoprolol succinate 25 MG 24 hr tablet  Commonly known as: TOPROL-XL     rivaroxaban 20 mg Tab  Commonly known as: XARELTO  Take 1 tablet (20 mg total) by mouth daily with dinner or evening meal.            STOP taking these medications      sotaloL 80 MG tablet  Commonly known as: BETAPACE              Gianni Carreno MD  Cardiovascular Disease PGY-VI  [facility]

## 2025-03-25 NOTE — ANESTHESIA POSTPROCEDURE EVALUATION
Anesthesia Post Evaluation    Patient: Osiel Speed    Procedure(s) Performed: Procedure(s) (LRB):  Ablation, SVT, Accessory Pathway (N/A)    Final Anesthesia Type: general      Patient participation: Yes- Able to Participate  Level of consciousness: awake and alert  Post-procedure vital signs: reviewed and stable  Pain control: Pain has been treated.  Airway patency: patent    PONV status: Absent or treated.  Anesthetic complications: no      Cardiovascular status: hemodynamically stable  Respiratory status: unassisted  Hydration status: euvolemic  Follow-up not needed.              Vitals Value Taken Time   /78 03/25/25 13:00   Temp 36.5 °C (97.7 °F) 03/25/25 13:30   Pulse 75 03/25/25 13:00   Resp 20 03/25/25 13:00   SpO2 100 % 03/25/25 13:00         No case tracking events are documented in the log.      Pain/Huey Score: Huey Score: 10 (3/25/2025 11:00 AM)

## 2025-03-25 NOTE — BRIEF OP NOTE
Attending: Jose Singh MD  Date of Procedure: 03/25/2025    Post-operative Diagnosis: AVNRT    Procedure Performed: RFA to the region of the slow pathway for treatment of AVNRT.     Description of Procedure: The patient was brought to the EP lab in the fasting state. Prepped and draped in sterile fashion. Safety timeout was performed. Sedation administered by anesthesia staff. Ultrasound guided venous access of the bilateral femoral veins was performed. HIS, and RV catheters placed via left femoral vein access. HRA and CS catheters via right femoral vein access. Diagnostic EP study confirmed AVNRT. HRA exchanged for ablation catheter. RFA to the slow pathway for treatment of AVNRT. Non inducible at end of study.     EBL: <10 mL    Specimens: none  Complications: no immediate    Plan:  Bedrest x 4 hours  ECG on upon arrival to PACU  Medication changes:   will not restart sotalol  Resume anticoagulation  Plan for discharge following bedrest if patient tolerating PO intake, voiding, and ambulatory without evidence of complications     The attending physician was present for entire duration of the procedure    Gianni Carreno MD  Cardiovascular Disease PGY-VI  [facility]

## 2025-03-25 NOTE — PLAN OF CARE
Dr. Carreno @ bedside to discuss findings with patient. Patient able to eat, ambulate and void independently post procedure. Discharge instructions and prescriptions reviewed with patient. Patient verbalizes understanding. VSS. IV removed. MOIZ.

## 2025-03-25 NOTE — H&P
[facility]  Electrophysiology  H&P    Patient Name: Osiel Kerr  MRN: [id number]    Subjective:   Osiel Kerr is a 64 y.o. male with a PMHx significant for HTN, HLD, tobacco use, paroxysmal atrial fibrillation, R lacunar stroke, ASD, and SVT. ILR implanted 10/2024 for AF. Several episodes of short-RP sustained SVT since implant. Since been started on sotalol 80mg BID. [id number]-[id number] 4 (Age, HTN, CVA). Increasing palpitations with pre-syncope/syncope episodes. ILR showing sustained SVT with TCL 350ms. Multiple monitors without evidence of AF.    He has not been on sotalol for several months. He has been on eliquis and compliant until held five days ago. He took a dose of xarelto yesterday morning.    Patient is here today for EPS +/- RFA for treatment of SVT.    Previous DCCV and/or procedural history:  [place] ILR 10/2024    Today's ECG: sinus bradycardia  Anticoagulation: rivaroxaban (Last dose 3/24/2025 morning)  TTE: EF 60-65%  Hgb: 16.2  Cr: 1.2  INR: 1.0    History:     Past Medical History:   Diagnosis Date    Essential (primary) hypertension     Mixed hyperlipidemia     Paroxysmal A-fib     Right-sided lacunar infarction 04/21/2024    Tobacco use 02/22/2024    Tobacco use disorder, severe, dependence 12/07/2020      Past Surgical History:   Procedure Laterality Date    FOOT SURGERY      child, due to stepping on a nail    INSERTION OF IMPLANTABLE LOOP RECORDER N/A 10/3/2024    Procedure: Insertion, Implantable Loop Recorder;  Surgeon: Jose Singh MD;  Location: [facility] EP LAB;  Service: Cardiology;  Laterality: N/A;  AF,ILR implant, BSci, local, GP, 3prep      Meds:     Review of patient's allergies indicates:   Allergen Reactions    Penicillin Other (See Comments)     As a child     Current Outpatient Medications   Medication Instructions    atorvastatin (LIPITOR) 80 mg, Oral, Daily    gabapentin (NEURONTIN) 100-300 mg, Oral, Nightly PRN    lisinopriL 10 mg, Oral, Daily    metoprolol  succinate (TOPROL-XL) 12.5 mg    rivaroxaban (XARELTO) 20 mg, Oral, With dinner    sotaloL (BETAPACE) 80 mg, Oral, 2 times daily     Review of Systems   Constitutional:  Negative for chills, diaphoresis, fever and malaise/fatigue.   HENT:  Negative for sore throat.    Eyes:  Negative for double vision.   Respiratory:  Negative for cough, shortness of breath and wheezing.    Cardiovascular:  Positive for palpitations. Negative for chest pain, orthopnea, claudication, leg swelling and PND.   Gastrointestinal:  Negative for abdominal pain, blood in stool, constipation, heartburn, nausea and vomiting.   Genitourinary:  Negative for hematuria.   Musculoskeletal:  Negative for falls and myalgias.   Neurological:  Negative for dizziness, loss of consciousness, weakness and headaches.   Psychiatric/Behavioral:  The patient is not nervous/anxious.      Objective:   There were no vitals taken for this visit.  Physical Exam  Vitals and nursing note reviewed.   Constitutional:       General: He is not in acute distress.     Appearance: Normal appearance. He is not diaphoretic.   HENT:      Head: Normocephalic and atraumatic.   Eyes:      Extraocular Movements: Extraocular movements intact.      Pupils: Pupils are equal, round, and reactive to light.   Neck:      Vascular: No carotid bruit, hepatojugular reflux or JVD.   Cardiovascular:      Rate and Rhythm: Regular rhythm. Bradycardia present.      Pulses: Normal pulses.           Radial pulses are 2+ on the right side and 2+ on the left side.        Femoral pulses are 2+ on the right side and 2+ on the left side.     Heart sounds: No murmur heard.  Pulmonary:      Effort: Pulmonary effort is normal.   Abdominal:      General: Abdomen is flat. Bowel sounds are normal. There is no distension.   Musculoskeletal:      Right lower leg: No edema.      Left lower leg: No edema.   Skin:     General: Skin is warm and dry.      Capillary Refill: Capillary refill takes less than 2  "seconds.      Findings: No rash or wound.   Neurological:      General: No focal deficit present.      Mental Status: He is alert and oriented to person, place, and time.   Psychiatric:         Mood and Affect: Mood normal.         Thought Content: Thought content normal.       Labs:     Lab Results   Component Value Date     03/18/2025    K 4.3 03/18/2025     03/18/2025    CO2 24 03/18/2025    BUN 16 03/18/2025    CREATININE 1.2 03/18/2025    ANIONGAP 11 03/18/2025     Lab Results   Component Value Date    HGBA1C 5.7 (H) 07/10/2024     No results found for: "BNP", "BNPTRIAGEBLO" Lab Results   Component Value Date    WBC 6.08 03/18/2025    HGB 16.2 03/18/2025    HCT 49.5 03/18/2025     03/18/2025    GRAN 4.6 07/10/2024    GRAN 63.4 07/10/2024     Lab Results   Component Value Date    CHOL 130 07/10/2024    HDL 50 07/10/2024    LDLCALC 66.6 07/10/2024    TRIG 67 07/10/2024      1/15/25 SVT on ILR      Assessment & Plan:     Osiel Kerr is a 63yo male with history significant for HTN, HLD, tobacco use, paroxysmal atrial fibrillation, R lacunar stroke, ASD, and SVT. ILR implanted 10/2024 for AF. Several episodes of short-RP sustained SVT since implant. Since been started on sotalol 80mg BID. [id number]-9-PDMv 4 (Age, HTN, CVA). Increasing palpitations with pre-syncope/syncope episodes. ILR showing sustained SVT with TCL 350ms.    Plan:  - EPS +/- RFA SVT.  - Anesthesia  - CARTO    Anticoagulation: rivaroxaban  EF (most recent): 60-65%  AAD/AVN agents: toprol 12.5mg daily    The risks, benefits and alternatives of the procedure were explained to the patient, patient's family and/or surrogate decision maker. Risks include (but not limited to) bleeding, hematoma, infection, pain, vascular damage, damage to structures surrounding the vasculature, myocardial damage [perforation, valvular damage], cardiac tamponade, phrenic nerve damage, pulmonary vein stenosis, atrioesophageal (AE) fistula, CVA, MI, and death. " Patient is understanding that repeat ablations may be required. All questions were answered. Patient is understanding of these risks, and would like to proceed. Consents signed.     Case discussed with Dr. Singh.    Signed:  Gianni Carreno MD  Cardiovascular Disease PGY-VI  [facility]

## 2025-03-25 NOTE — TRANSFER OF CARE
"Anesthesia Transfer of Care Note    Patient: Osiel Speed    Procedure(s) Performed: Procedure(s) (LRB):  Ablation, SVT, Accessory Pathway (N/A)    Patient location: Cath Lab    Anesthesia Type: general    Transport from OR: Transported from OR on 6-10 L/min O2 by face mask with adequate spontaneous ventilation    Post pain: adequate analgesia    Post assessment: no apparent anesthetic complications and tolerated procedure well    Post vital signs: stable    Level of consciousness: awake    Nausea/Vomiting: no nausea/vomiting    Complications: none    Transfer of care protocol was followed      Last vitals: Visit Vitals  /85 (BP Location: Left arm, Patient Position: Lying)   Pulse (!) 57   Temp 36.6 °C (97.9 °F) (Temporal)   Resp 18   Ht 5' 8" (1.727 m)   Wt 64.9 kg (143 lb)   SpO2 99%   BMI 21.74 kg/m²     "

## 2025-03-25 NOTE — NURSING TRANSFER
Nursing Transfer Note      3/25/2025   10:48 AM    Pt transported to SSCU 2 c remote cardiac telemetry monitor.  VSS, denies pain, n/v, sob.  Report given to receiving sscu rn, family updated

## 2025-03-25 NOTE — PLAN OF CARE
Received pt to SSCU from home accompanied by spouse.  AAO x 4. Denies pain or discomfort. Respirations even and unlabored. No distress noted. Pt stable.  Admit assessment complete. IV x 1 placed.  Pt oriented to room and call bell placed within reach.

## 2025-03-25 NOTE — PLAN OF CARE
Received report from IVY Anderson. Patient s/p a fib ablation, AAOx3. VSS, no c/o pain or discomfort at this time, resp even and unlabored. Gauze/tegaderm dressing to bilateral groins is CDI. No active bleeding. No hematoma noted. Post procedure protocol reviewed with patient and patient's family. Understanding verbalized. Family members at bedside. Nurse call bell within reach.

## 2025-03-25 NOTE — NURSING
Pt left unit in wheelchair for discharge home with escort and spouse at side.  Denies pain or discomfort.  Respirations even and unlabored. No distress noted. Pt stable.

## 2025-03-25 NOTE — DISCHARGE INSTRUCTIONS
Anesthesia Post-op Note    Patient: Carmelita Padgett  Procedure(s) Performed: EGD (ESOPHAGOGASTRODUODENOSCOPY)/WITH POSSIBLE DILATIONCOLONOSCOPY  Anesthesia type: MAC    Vitals Value Taken Time   Temp 36.4 °C (97.5 °F) 11/17/23 0853   Pulse 70 11/17/23 0853   Resp 16 11/17/23 0913   SpO2 97 % 11/17/23 0913   /63 11/17/23 0913         Patient Location: PACU Phase 1  Post-op Vital Signs:stable  Level of Consciousness: awake and alert  Respiratory Status: spontaneous ventilation  Cardiovascular stable  Hydration: euvolemic  Pain Management: adequately controlled  Handoff: Handoff to receiving clinician was performed and questions were answered  Vomiting: none  Nausea: None  Airway Patency:patent  Post-op Assessment: no complications and patient tolerated procedure well      No notable events documented.                     Discharge Instructions and Care of Your Groin      Catheter Insertion Site  The morning after your procedure, you may take the dressing off. The easiest way to do this is when you are showering, get the tape and dressing wet and remove it.  After the bandage is removed, cover the area with a small adhesive bandage. It is normal for the catheter insertion site to be black and blue for a couple of days. The site may also be slightly swollen and pink, and there may be a small lump (about the size of a quarter) at the site.  Wash the catheter insertion site at least once daily with soap and water. Place soapy water on your hand or washcloth and gently wash the insertion site; do not rub.  Keep the area clean and dry when you are not showering.  Do not use creams, lotions or ointment on the wound site.  Wear loose clothes and loose underwear.  Do not take a bath, tub soak, go in a Jacuzzi, or swim in a pool or lake for one week after the procedure.    Activity Instructions  Do not strain during bowel movements for the first 3 to 4 days after the procedure to prevent bleeding from the catheter insertion site.  Avoid heavy lifting (more than 10 pounds) and pushing or pulling heavy objects for the first 5 to 7 days after the procedure.  Do not participate in strenuous activities for 5 days after the procedure. This includes most sports - jogging, golfing, play tennis, and bowling.  You may climb stairs if needed, but walk up and down the stairs more slowly than usual.  Gradually increase your activities until you reach your normal activity level within one week after the procedure.      If bleeding should occur following discharge:  Sit down and apply firm pressure to the puncture site with your fingers for 10 minutes   If the bleeding stops, continue to sit quietly, keeping your leg straight for 2 hours. Notify your physician as soon as possible   If bleeding does not stop after 10 minutes or if there is a large amount of  bleeding or spurting, call 911 immediately.  Do not drive yourself to the hospital.     Notify your physician if these symptoms persist or if you experience:  Change in color or temperature of the leg  Redness, heat, or pus at the puncture site  Chills or fever greater than 101.0 F

## 2025-03-31 ENCOUNTER — PATIENT MESSAGE (OUTPATIENT)
Dept: INTERNAL MEDICINE | Facility: CLINIC | Age: 65
End: 2025-03-31
Payer: MEDICARE

## 2025-03-31 PROBLEM — I77.811 ECTATIC ABDOMINAL AORTA: Status: ACTIVE | Noted: 2025-03-31

## 2025-03-31 LAB
OHS CV AF BURDEN PERCENT: < 1
OHS CV DC REMOTE DEVICE TYPE: NORMAL
OHS CV ICD SHOCK: NO

## 2025-03-31 NOTE — TELEPHONE ENCOUNTER
Message sent to pt  Ultrasound of aorta shows some enlargement, not an aneurysm.  Need to keep BP controlled to avoid worsening to an aneurysm.

## 2025-04-03 LAB — NONINV COLON CA DNA+OCC BLD SCRN STL QL: POSITIVE

## 2025-04-09 ENCOUNTER — PATIENT OUTREACH (OUTPATIENT)
Dept: ADMINISTRATIVE | Facility: HOSPITAL | Age: 65
End: 2025-04-09
Payer: MEDICARE

## 2025-04-09 NOTE — PROGRESS NOTES
Chart reviewed and updated. Reconciled immunizations, health maintenance and care everywhere.  Scanning [facility] Colonoscopy report/pathology 05/20/2021.      Radha Mcgregor, [facility]  Panel Care Coordinator  [facility]  [phone]  For Seinna Perry MD

## 2025-04-11 ENCOUNTER — TELEPHONE (OUTPATIENT)
Dept: ELECTROPHYSIOLOGY | Facility: CLINIC | Age: 65
End: 2025-04-11
Payer: MEDICARE

## 2025-05-06 ENCOUNTER — CLINICAL SUPPORT (OUTPATIENT)
Dept: CARDIOLOGY | Facility: HOSPITAL | Age: 65
End: 2025-05-06
Attending: INTERNAL MEDICINE
Payer: MEDICARE

## 2025-05-06 ENCOUNTER — CLINICAL SUPPORT (OUTPATIENT)
Dept: CARDIOLOGY | Facility: HOSPITAL | Age: 65
End: 2025-05-06
Payer: MEDICARE

## 2025-05-06 DIAGNOSIS — I49.8 OTHER SPECIFIED CARDIAC ARRHYTHMIAS: ICD-10-CM

## 2025-05-06 PROCEDURE — 93298 REM INTERROG DEV EVAL SCRMS: CPT | Performed by: INTERNAL MEDICINE

## 2025-05-06 PROCEDURE — 93298 REM INTERROG DEV EVAL SCRMS: CPT | Mod: 26,,, | Performed by: INTERNAL MEDICINE

## 2025-05-07 LAB
OHS CV AF BURDEN PERCENT: < 1
OHS CV DC REMOTE DEVICE TYPE: NORMAL
OHS CV ICD SHOCK: NO

## 2025-05-20 ENCOUNTER — PATIENT MESSAGE (OUTPATIENT)
Dept: INTERNAL MEDICINE | Facility: CLINIC | Age: 65
End: 2025-05-20
Payer: MEDICARE

## 2025-05-20 DIAGNOSIS — R19.5 POSITIVE COLORECTAL CANCER SCREENING USING COLOGUARD TEST: Primary | ICD-10-CM

## 2025-05-23 ENCOUNTER — TELEPHONE (OUTPATIENT)
Dept: ENDOSCOPY | Facility: HOSPITAL | Age: 65
End: 2025-05-23
Payer: MEDICARE

## 2025-05-23 DIAGNOSIS — Z12.11 ENCOUNTER FOR SCREENING COLONOSCOPY FOR NON-HIGH-RISK PATIENT: Primary | ICD-10-CM

## 2025-05-23 NOTE — TELEPHONE ENCOUNTER
Colonoscopy Procedure Prep Instructions    Date of procedure: 6/19/25 Arrive at: 11:20 AM    Location of Department:   [facility] [address] [place] [address] [place] [address]  Take the Atrium Elevators to 4th Floor Endoscopy Lab    As soon as possible:   your prep from pharmacy and over the counter DULCOLAX LAXATIVE TABLETS            On the day before your procedure   What You CAN do:   You may have clear liquids ONLY-see below for list.     Liquids That Are OK to Drink:   Water  Sports drinks (Gatorade, Power-Aid)  Coffee or tea (no cream or nondairy creamer)  Clear juices without pulp (apple, white grape)  Gelatin desserts (no fruit or toppings)  Clear soda (sprite, coke, ginger ale)  Chicken broth (until 12 midnight the night before procedure)    What You CANNOT do:   Do not EAT solid food, drink milk or anything   colored red.  Do not drink alcohol.  Do not take oral medications within 1 hour of starting   each dose of prep.  No gum chewing or candy morning of procedure                       Note:   (Please disregard the insert instructions from pharmacy).  PEG Bowel Prep is indicated for cleansing of the colon as a preparation for colonoscopy in adults.   Be sure to tell your doctor about all the medicines you take, including prescription and non-prescription medicines, vitamins, and herbal supplements. PEG Bowel Prep may affect how other medicines work.  Medication taken by mouth may not be absorbed properly when taken within 1 hour before the start of each dose of PEG Bowel Prep.    It is not uncommon to experience some abdominal cramping, nausea and/or vomiting when taking the prep. If you have nausea and/or vomiting while taking the prep, stop drinking for 20 to 30 minutes then continue.      How to take prep:    PEG Bowel Prep is a (2-day) prep.    One (1) bottle of prep are required for a complete preparation for colonoscopy. Dilute the solution concentrate as directed prior to  use. You must drink water with each dose of prep, and additional water after each dose.    DOSE 1--Day Before Colonoscopy 6/18/25     Drink at least 6 to 8 glasses of clear liquids from time you wake up until you begin your prep and then continue until bedtime to avoid dehydration.     12:00 pm (NOON) Mix your entire container of prep with lukewarm water and refrigerate. Take four (4) Dulcolax (Bisacodyl) tablets with at least 8 ounces or more of clear liquids.       6:00 pm:    You must complete Steps 1 and 2 below before going to bed:    Step 1-Drink half the liquid in the container within one (1) hour.   Step 2-Refrigerate the remaining half of the liquid for dose 2. See below when to begin this step.                       IMPORTANT: If you experience preparation-related symptoms (for example, nausea, bloating, or cramping), stop, or slow the rate of drinking the additional water until your symptoms decrease.    DOSE 2--Day of the Colonoscopy 6/19/25 at 2-3 AM.    For this dose, repeat Step 1 shown above using the remaining half of the liquid prep.   You may continue drinking water/clear liquids until   4 hours before your colonoscopy or as directed by the scheduling nurse  8:20 AM.      For information about your procedure, two (2) things to view prior to colonoscopy:  Please watch this informational video. It is important to watch this animated consent video prior to your arrival. If you haven't watched the video prior to arriving, you are required to watch it during admission which can causes delays.    Options for viewing:   Using a keyboard:  press and hold the control tab (Ctrl) and left mouse click to follow links.           Colonoscopy Instructional Video                                                                                   OR    Type link address into your web browser's address bar:  [email or web address]      Educational Booklet with pictures:      Colonoscopy Prep -  Liquid      Comments:          IMPORTANT INFORMATION TO KNOW BEFORE YOUR PROCEDURE    [facility] 4th Floor         If your procedure requires the administration of anesthesia, it is necessary for a responsible adult to drive you home. (Medical Transportation, Uber, Lyft, Taxi, etc. may ONLY be used if a responsible adult is present to accompany you home.  The responsible adult CAN'T be the  of the service).      person must be available to return to pick you up within 15 minutes of being notified of discharge.       Please bring a picture ID, insurance card, & copayment      Take Medications as directed below:    ]          1) Stop taking Xarelto (rivaroxaban) 2 days (prior to the procedure) on:  6/17/25      If you begin taking any blood thinning medications, injectable weight loss/diabetes medications (other than insulin) , Adipex (Phentermine) , please contact the endoscopy scheduling department listed below as soon as possible.    If you are diabetic see the attached instruction sheet regarding your medication.     If you take HEART, BLOOD PRESSURE, SEIZURE, PAIN, LUNG (including inhalers/nebulizers), ANTI-REJECTION (transplant patients), or PSYCHIATRIC medications, please take at your regular times with a sip of water or as directed by the scheduling nurse.     Important contact information:    Endoscopy [phone] Hours of operation Monday-Friday 8:00-4:30pm.    Questions about insurance or financial obligations call [phone] or [phone].    If you have questions regarding the prep or need to reschedule, please call [phone]. After hours questions requiring immediate assistance, contact [facility] nurse line at [phone] or [phone].   NOTE:     On occasion, unforeseen circumstances may cause a delay in your procedure start time. We respect your time and appreciate your patience during these circumstances.      Comments:

## 2025-05-26 ENCOUNTER — TELEPHONE (OUTPATIENT)
Dept: ENDOSCOPY | Facility: HOSPITAL | Age: 65
End: 2025-05-26
Payer: MEDICARE

## 2025-05-26 NOTE — TELEPHONE ENCOUNTER
Dear Dr Singh,    Patient has a scheduled procedure Colonoscopy on 6/19/25 and is currently taking a blood thinner. In order to ensure patient safety, we would like to confirm that the patient can place their blood thinner medication on hold for the procedure. Can he/she discontinue Xarelto (rivaroxaban) for a minimum of 2 days prior to the procedure?     Thank you for your prompt reply.    [facility] Endoscopy Scheduling

## 2025-05-26 NOTE — TELEPHONE ENCOUNTER
----- Message from Nurse Linda sent at 2025 10:04 AM CDT -----  Regardin/19  BT/Cl  The patient is currently under an internal cardiologist, Dr.Greg Singh,  [facility]. Is patient okay to hold blood thinner Xarelto for their upcoming scheduled Colonoscopy on 25. Additional request(s) required:internal cardiologist, Tam Singh, MDmedically optimized by Cardiology

## 2025-06-18 ENCOUNTER — TELEPHONE (OUTPATIENT)
Dept: ENDOSCOPY | Facility: HOSPITAL | Age: 65
End: 2025-06-18
Payer: MEDICARE

## 2025-06-18 ENCOUNTER — NURSE TRIAGE (OUTPATIENT)
Dept: ADMINISTRATIVE | Facility: CLINIC | Age: 65
End: 2025-06-18
Payer: MEDICARE

## 2025-06-18 NOTE — TELEPHONE ENCOUNTER
Pt states he has a colonoscopy tomorrow and wanted to go over the time on his prep instructions. Patient also wanted to know if he can start his prep early at 2pm instead of 6pm. Patient awaiting a return phone call.  Reason for Disposition   Nursing judgment    Protocols used: Information Only Call - No Triage-A-OH

## 2025-06-19 ENCOUNTER — ANESTHESIA EVENT (OUTPATIENT)
Dept: ENDOSCOPY | Facility: HOSPITAL | Age: 65
End: 2025-06-19
Payer: MEDICARE

## 2025-06-19 ENCOUNTER — ANESTHESIA (OUTPATIENT)
Dept: ENDOSCOPY | Facility: HOSPITAL | Age: 65
End: 2025-06-19
Payer: MEDICARE

## 2025-06-19 ENCOUNTER — HOSPITAL ENCOUNTER (OUTPATIENT)
Facility: HOSPITAL | Age: 65
Discharge: HOME | End: 2025-06-19
Attending: STUDENT IN AN ORGANIZED HEALTH CARE EDUCATION/TRAINING PROGRAM | Admitting: STUDENT IN AN ORGANIZED HEALTH CARE EDUCATION/TRAINING PROGRAM
Payer: MEDICARE

## 2025-06-19 VITALS
TEMPERATURE: 97 F | OXYGEN SATURATION: 100 % | HEIGHT: 68 IN | BODY MASS INDEX: 22.12 KG/M2 | SYSTOLIC BLOOD PRESSURE: 126 MMHG | WEIGHT: 145.94 LBS | HEART RATE: 61 BPM | DIASTOLIC BLOOD PRESSURE: 81 MMHG | RESPIRATION RATE: 18 BRPM

## 2025-06-19 DIAGNOSIS — Z86.0100 PERSONAL HISTORY OF COLON POLYPS, UNSPECIFIED: ICD-10-CM

## 2025-06-19 DIAGNOSIS — Z12.11 ENCOUNTER FOR SCREENING COLONOSCOPY FOR NON-HIGH-RISK PATIENT: ICD-10-CM

## 2025-06-19 PROCEDURE — 45385 COLONOSCOPY W/LESION REMOVAL: CPT | Mod: PT,,, | Performed by: STUDENT IN AN ORGANIZED HEALTH CARE EDUCATION/TRAINING PROGRAM

## 2025-06-19 PROCEDURE — 88305 TISSUE EXAM BY PATHOLOGIST: CPT | Mod: TC | Performed by: STUDENT IN AN ORGANIZED HEALTH CARE EDUCATION/TRAINING PROGRAM

## 2025-06-19 PROCEDURE — 63600175 PHARM REV CODE 636 W HCPCS

## 2025-06-19 PROCEDURE — 27201089 HC SNARE, DISP (ANY): Performed by: STUDENT IN AN ORGANIZED HEALTH CARE EDUCATION/TRAINING PROGRAM

## 2025-06-19 PROCEDURE — 88305 TISSUE EXAM BY PATHOLOGIST: CPT | Mod: 26,,, | Performed by: PATHOLOGY

## 2025-06-19 PROCEDURE — 37000009 HC ANESTHESIA EA ADD 15 MINS: Performed by: STUDENT IN AN ORGANIZED HEALTH CARE EDUCATION/TRAINING PROGRAM

## 2025-06-19 PROCEDURE — 45385 COLONOSCOPY W/LESION REMOVAL: CPT | Mod: PT | Performed by: STUDENT IN AN ORGANIZED HEALTH CARE EDUCATION/TRAINING PROGRAM

## 2025-06-19 PROCEDURE — 37000008 HC ANESTHESIA 1ST 15 MINUTES: Performed by: STUDENT IN AN ORGANIZED HEALTH CARE EDUCATION/TRAINING PROGRAM

## 2025-06-19 PROCEDURE — 25000003 PHARM REV CODE 250

## 2025-06-19 RX ORDER — LIDOCAINE HYDROCHLORIDE 20 MG/ML
INJECTION INTRAVENOUS
Status: DISCONTINUED | OUTPATIENT
Start: 2025-06-19 | End: 2025-06-19

## 2025-06-19 RX ORDER — SODIUM CHLORIDE 9 MG/ML
INJECTION, SOLUTION INTRAVENOUS CONTINUOUS
Status: DISCONTINUED | OUTPATIENT
Start: 2025-06-19 | End: 2025-06-19 | Stop reason: HOSPADM

## 2025-06-19 RX ORDER — PROPOFOL 10 MG/ML
VIAL (ML) INTRAVENOUS CONTINUOUS PRN
Status: DISCONTINUED | OUTPATIENT
Start: 2025-06-19 | End: 2025-06-19

## 2025-06-19 RX ADMIN — SODIUM CHLORIDE: 0.9 INJECTION, SOLUTION INTRAVENOUS at 12:06

## 2025-06-19 RX ADMIN — PROPOFOL 80 MG: 10 INJECTION, EMULSION INTRAVENOUS at 01:06

## 2025-06-19 RX ADMIN — PROPOFOL 150 MCG/KG/MIN: 10 INJECTION, EMULSION INTRAVENOUS at 01:06

## 2025-06-19 RX ADMIN — GLYCOPYRROLATE 0.2 MG: 0.2 INJECTION, SOLUTION INTRAMUSCULAR; INTRAVENOUS at 01:06

## 2025-06-19 RX ADMIN — LIDOCAINE HYDROCHLORIDE 60 MG: 20 INJECTION INTRAVENOUS at 01:06

## 2025-06-19 NOTE — ANESTHESIA PREPROCEDURE EVALUATION
[facility]  Anesthesia Pre-Operative Evaluation     Patient Name: Osiel Kerr  YOB: 1960  MRN: [id number]  [facility]: [id number]       Admit Date: 6/19/2025   Admit Team: Networked reference to record PCT   Hospital Day: 1  Date of Procedure: 6/19/2025  Anesthesia: Choice Procedure: Procedure(s) (LRB):  COLONOSCOPY, SCREENING, HIGH RISK PATIENT (N/A)  Pre-Operative Diagnosis: Encounter for screening colonoscopy for non-high-risk patient [Z12.11]  Proceduralist:Surgeons and Role:     * Darnell Hooper MD - Primary  Code Status: Prior   Advanced Directive: <no information>  Isolation Precautions: No active isolations  Capacity: Full capacity     SUBJECTIVE:   Osiel Kerr is a 65 y.o. male with below PMH presenting for above procedure(s).  Past Medical History:   Diagnosis Date    Anticoagulant long-term use     Essential (primary) hypertension     Mixed hyperlipidemia     Paroxysmal A-fib     Right-sided lacunar infarction 04/21/2024    Stroke     Tobacco use 02/22/2024    Tobacco use disorder, severe, dependence 12/07/2020        No notes on file   0.9% NaCl   Intravenous Continuous         Hospital LOS: 0 days  ICU LOS: Patient does not have an ICU stay during this admission.    ALLERGIES:     Review of patient's allergies indicates:   Allergen Reactions    Penicillin Other (See Comments)     As a child     LDA:     Lines/Drains/Airways       Peripheral Intravenous Line  Duration             Peripheral IV 06/19/25 1144 20 G 1 in Right Hand <1 day                   Anesthesia Evaluation     Patient summary reviewed    No history of anesthetic complications   Airway   Mallampati: III  TM distance: Normal  Neck ROM: Normal ROM  Dental    (+) Intact    Pulmonary    (-) asthma, shortness of breath  Cardiovascular   (+) hypertension, dysrhythmias (AVNRT s/p ablation)  (-) past MI, angina, JOHNSON    Neuro/Psych    (+) neuromuscular disease, CVA    GI/Hepatic/Renal    (-) GERD, liver disease,  "renal disease    Endo/Other    (-) diabetes mellitus  Abdominal                MEDICATIONS:     Current Outpatient Medications on File Prior to Encounter   Medication Sig Dispense Refill Last Dose/Taking    atorvastatin (LIPITOR) 80 MG tablet Take 1 tablet (80 mg total) by mouth once daily. 90 tablet 3 6/18/2025    gabapentin (NEURONTIN) 100 MG capsule Take 1-3 capsules (100-300 mg total) by mouth nightly as needed (restless legs  (note: take 1-2 hours prior to usual onset of symptoms)). 90 capsule 1 6/18/2025    lisinopriL 10 MG tablet Take 1 tablet (10 mg total) by mouth once daily. 90 tablet 3 6/18/2025    metoprolol succinate (TOPROL-XL) 25 MG 24 hr tablet Take 12.5 mg by mouth.   6/18/2025    rivaroxaban (XARELTO) 20 mg Tab Take 1 tablet (20 mg total) by mouth daily with dinner or evening meal. 90 tablet 3       Inpatient Medications:  Antibiotics (From admission, onward)      None          VTE Risk Mitigation (From admission, onward)      None              Current Medications[1]       History:   There are no hospital problems to display for this patient.    Surgical History:    has a past surgical history that includes Foot surgery; Insertion of implantable loop recorder (N/A, 10/3/2024); and ablation, svt, accessory pathway (N/A, 3/25/2025).   Social History:    has no history on file for sexual activity.  reports that he quit smoking about 8 months ago. His smoking use included cigarettes and vaping with nicotine. He started smoking about 48 years ago. He has a 45.9 pack-year smoking history. He has never used smokeless tobacco. He reports current alcohol use. He reports that he does not use drugs.    Vitals:    06/19/25 1141   BP: 132/87   BP Location: Left arm   Patient Position: Lying   Pulse: 69   Resp: 18   Temp: 36.6 °C (97.9 °F)   TempSrc: Temporal   SpO2: 98%   Weight: 66.2 kg (145 lb 15.1 oz)   Height: 5' 8" (1.727 m)     Vital Signs Range (Last 24H):  Temp:  [36.6 °C (97.9 °F)]   Pulse:  [69] " "  Resp:  [18]   BP: (132)/(87)   SpO2:  [98 %]     Body mass index is 22.19 kg/m².  Wt Readings from Last 4 Encounters:   06/19/25 66.2 kg (145 lb 15.1 oz)   03/25/25 64.9 kg (143 lb)   03/13/25 65.3 kg (143 lb 15.4 oz)   02/03/25 67 kg (147 lb 11.3 oz)        Intake/Output - Last 3 Shifts       None          Lab Results   Component Value Date    WBC 6.08 03/18/2025    HGB 16.2 03/18/2025    HCT 49.5 03/18/2025     03/18/2025     03/18/2025    K 4.3 03/18/2025     03/18/2025    CREATININE 1.2 03/18/2025    BUN 16 03/18/2025    CO2 24 03/18/2025    GLU 96 03/18/2025    CALCIUM 9.2 03/18/2025    MG 2.0 04/22/2024    PHOS 3.4 04/22/2024    ALKPHOS 81 07/10/2024    ALT 29 07/10/2024    AST 23 07/10/2024    ALBUMIN 4.0 07/10/2024    INR 1.0 03/18/2025    APTT 29.1 03/18/2025    HGBA1C 5.7 (H) 07/10/2024    TROPONINI <0.006 04/22/2024     No results found for this or any previous visit (from the past 12 hours).  No results for input(s): "WBC", "HGB", "HCT", "PLT", "NA", "K", "CREATININE", "GLU", "INR", "LACTATE", "5HIAAPLASMA", "5HIAAURINT", "5HIAA", "1NXUS17NR" in the last 168 hours.  No LMP for male patient.    EKG:   Results for orders placed or performed during the hospital encounter of 03/25/25   EKG 12-lead    Collection Time: 03/25/25  9:31 AM   Result Value Ref Range    QRS Duration 84 ms    [facility] QTC Calculation 439 ms    Narrative    Test Reason : Z98.890,Z86.79,    Vent. Rate :  59 BPM     Atrial Rate :  59 BPM     P-R Int : 170 ms          QRS Dur :  84 ms      QT Int : 444 ms       P-R-T Axes :  80  64  83 degrees    QTcB Int : 439 ms    Sinus bradycardia with Premature atrial complexes  Low septal forces  ST and T wave abnormality, consider anterior ischemia  Abnormal ECG  When compared with ECG of 25-Mar-2025 05:53,  Premature atrial complexes are now Present  Confirmed by Marc Reid ([id number]) on 3/25/2025 10:04:55 AM    Referred By: ROSETTE ARANGO           Confirmed By: Marc Ried "     TTE:  Results for orders placed during the hospital encounter of 06/11/24    Echo Saline Bubble? Yes    Interpretation Summary    Left Ventricle: The left ventricle is normal in size. Normal wall thickness. Normal wall motion. There is normal systolic function with a visually estimated ejection fraction of 60 - 65%. There is normal diastolic function.    Right Ventricle: Normal right ventricular cavity size. Wall thickness is normal. Systolic function is normal.    Left Atrium: Agitated saline study of the atrial septum is positive, intracardiac shunt at atrial level. There is a very small (<10 bubbles) shunt present with bubbles visualized crossing the interatrial septum.    Mitral Valve: There is mild regurgitation.    Pulmonary Artery: The estimated pulmonary artery systolic pressure is 23 mmHg.    IVC/SVC: Normal venous pressure at 3 mmHg.      Pre-op Assessment    I have reviewed the Patient Summary Reports.    I have reviewed the NPO Status.   I have reviewed the Medications.     Review of Systems  Anesthesia Hx:   Denies Hx of Anesthetic complications              Denies Personal Hx of Anesthesia complications.                    Social:  Former Smoker       Cardiovascular:     Hypertension   Denies MI.     Dysrhythmias (AVNRT s/p ablation)   Denies Angina.       Denies JOHNSON.                              Pulmonary:     Denies Asthma.   Denies Shortness of breath.                  Renal/:   Denies Chronic Renal Disease.                Hepatic/GI:      Denies GERD. Denies Liver Disease.               Neurological:   CVA Neuromuscular Disease,                                   Endocrine:  Denies Diabetes.               Physical Exam  General: Well nourished, Cooperative, Alert and Oriented    Airway:  Mallampati: III / II  Mouth Opening: Normal  TM Distance: Normal  Tongue: Normal  Neck ROM: Normal ROM    Dental:  Intact    Anesthesia Plan  Type of Anesthesia, risks & benefits discussed:    Anesthesia  Type: Gen Natural Airway  Intra-op Monitoring Plan: Standard ASA Monitors  Induction:  IV  Informed Consent: Informed consent signed with the Patient and all parties understand the risks and agree with anesthesia plan.  All questions answered.   ASA Score: 3  Day of Surgery Review of History & Physical: H&P Update referred to the surgeon/provider.    Ready For Surgery From Anesthesia Perspective.   .             [1]  Current Facility-Administered Medications   Medication Dose Route Frequency Provider Last Rate Last Admin    0.9% NaCl infusion   Intravenous Continuous Darnell Hooper MD

## 2025-06-19 NOTE — TRANSFER OF CARE
"Anesthesia Transfer of Care Note    Patient: Osiel Speed    Procedure(s) Performed: Procedure(s) (LRB):  COLONOSCOPY, SCREENING, HIGH RISK PATIENT (N/A)    Patient location: GI    Anesthesia Type: general    Transport from OR: Transported from OR on 2-3 L/min O2 by NC with adequate spontaneous ventilation    Post pain: adequate analgesia    Post assessment: no apparent anesthetic complications    Post vital signs: stable    Level of consciousness: sedated    Nausea/Vomiting: no nausea/vomiting    Complications: none    Transfer of care protocol was followed      Last vitals: Visit Vitals  /87 (BP Location: Left arm, Patient Position: Lying)   Pulse 69   Temp 36.6 °C (97.9 °F) (Temporal)   Resp 18   Ht 5' 8" (1.727 m)   Wt 66.2 kg (145 lb 15.1 oz)   SpO2 98%   BMI 22.19 kg/m²     "

## 2025-06-19 NOTE — ANESTHESIA POSTPROCEDURE EVALUATION
Anesthesia Post Evaluation    Patient: Osiel Speed    Procedure(s) Performed: Procedure(s) (LRB):  COLONOSCOPY, SCREENING, HIGH RISK PATIENT (N/A)    Final Anesthesia Type: general      Patient location during evaluation: GI PACU  Patient participation: Yes- Able to Participate  Level of consciousness: awake and alert and oriented  Post-procedure vital signs: reviewed and stable  Pain management: adequate  Airway patency: patent    PONV status at discharge: No PONV  Anesthetic complications: no      Cardiovascular status: blood pressure returned to baseline  Respiratory status: unassisted and spontaneous ventilation  Hydration status: euvolemic  Follow-up not needed.          Vitals Value Taken Time   /81 06/19/25 14:02   Temp 36.3 °C (97.3 °F) 06/19/25 13:32   Pulse 61 06/19/25 14:02   Resp 18 06/19/25 14:02   SpO2 100 % 06/19/25 14:02         Event Time   Out of Recovery 14:18:46         Pain/Huey Score: Huey Score: 8 (6/19/2025  1:32 PM)

## 2025-06-19 NOTE — PLAN OF CARE
Discharge instructions and Provations reviewed with patient verbalizes understanding. PIV removed, cannula intact. NAD noted denies pain & discomfort    [id number] Escorted steady gait to lobby to wife

## 2025-06-19 NOTE — H&P
Short Stay Endoscopy History and Physical    PCP - Sienna Perry MD    Procedure - Colonoscopy  ASA - per anesthesia  Mallampati - per anesthesia  History of Anesthesia problems - no  Family history Anesthesia problems -  no   Plan of anesthesia - General    HPI:  This is a 65 y.o. male here for evaluation of : CRC surveillance      Medical History:  has a past medical history of Anticoagulant long-term use, Essential (primary) hypertension, Mixed hyperlipidemia, Paroxysmal A-fib, Right-sided lacunar infarction (04/21/2024), Stroke, Tobacco use (02/22/2024), and Tobacco use disorder, severe, dependence (12/07/2020).    Surgical History:  has a past surgical history that includes Foot surgery; Insertion of implantable loop recorder (N/A, 10/3/2024); and ablation, svt, accessory pathway (N/A, 3/25/2025).    Family History: family history includes Diabetes Mellitus in his mother; Heart attack (age of onset: 53) in his maternal grandfather; Hypertension in his brother, sister, and sister; Lymphoma (age of onset: 62) in his father.    Social History:  reports that he quit smoking about 8 months ago. His smoking use included cigarettes and vaping with nicotine. He started smoking about 48 years ago. He has a 45.9 pack-year smoking history. He has never used smokeless tobacco. He reports current alcohol use. He reports that he does not use drugs.    Review of patient's allergies indicates:   Allergen Reactions    Penicillin Other (See Comments)     As a child       Medications:   Prescriptions Prior to Admission[1]      Physical Exam:    Vital Signs:   Vitals:    06/19/25 1141   BP: 132/87   Pulse: 69   Resp: 18   Temp: 97.9 °F (36.6 °C)       General Appearance: Well appearing in no acute distress  Head: Normocephalic, without obvious abnormality   Lungs: Non-labored breathing  Abdomen: Soft, non tender, non distended     Labs:  Lab Results   Component Value Date    WBC 6.08 03/18/2025    HGB 16.2 03/18/2025     HCT 49.5 03/18/2025     03/18/2025    CHOL 130 07/10/2024    TRIG 67 07/10/2024    HDL 50 07/10/2024    ALT 29 07/10/2024    AST 23 07/10/2024     03/18/2025    K 4.3 03/18/2025     03/18/2025    CREATININE 1.2 03/18/2025    BUN 16 03/18/2025    CO2 24 03/18/2025    TSH 0.499 04/21/2024    INR 1.0 03/18/2025    HGBA1C 5.7 (H) 07/10/2024       I have explained the risks and benefits of endoscopy procedures to the patient including but not limited to bleeding, perforation, infection, and death.  The patient was asked if they understand and allowed to ask any further questions to their satisfaction.    Darnell Hooper MD        [1]   Medications Prior to Admission   Medication Sig Dispense Refill Last Dose/Taking    atorvastatin (LIPITOR) 80 MG tablet Take 1 tablet (80 mg total) by mouth once daily. 90 tablet 3 6/18/2025    gabapentin (NEURONTIN) 100 MG capsule Take 1-3 capsules (100-300 mg total) by mouth nightly as needed (restless legs  (note: take 1-2 hours prior to usual onset of symptoms)). 90 capsule 1 6/18/2025    lisinopriL 10 MG tablet Take 1 tablet (10 mg total) by mouth once daily. 90 tablet 3 6/18/2025    metoprolol succinate (TOPROL-XL) 25 MG 24 hr tablet Take 12.5 mg by mouth.   6/18/2025    rivaroxaban (XARELTO) 20 mg Tab Take 1 tablet (20 mg total) by mouth daily with dinner or evening meal. 90 tablet 3

## 2025-06-19 NOTE — PROVATION PATIENT INSTRUCTIONS
Discharge Summary/Instructions after an Endoscopic Procedure  Patient Name: Osiel Kerr  Patient MRN: [id number]  Patient YOB: 1960  Thursday, June 19, 2025  Darnell Hooper MD  Dear patient,  As a result of recent federal legislation (The Federal Cures Act), you may   receive lab or pathology results from your procedure in your [facility]   account before your physician is able to contact you. Your physician or   their representative will relay the results to you with their   recommendations at their soonest availability.  Thank you,  RESTRICTIONS:  During your procedure today, you received medications for sedation.  These   medications may affect your judgment, balance and coordination.  Therefore,   for 24 hours, you have the following restrictions:   - DO NOT drive a car, operate machinery, make legal/financial decisions,   sign important papers or drink alcohol.    ACTIVITY:  Today: no heavy lifting, straining or running due to procedural   sedation/anesthesia.  The following day: return to full activity including work.  DIET:  Eat and drink normally unless instructed otherwise.     TREATMENT FOR COMMON SIDE EFFECTS:  - Mild abdominal pain, nausea, belching, bloating or excessive gas:  rest,   eat lightly and use a heating pad.  - Sore Throat: treat with throat lozenges and/or gargle with warm salt   water.  - Because air was used during the procedure, expelling large amounts of air   from your rectum or belching is normal.  - If a bowel prep was taken, you may not have a bowel movement for 1-3 days.    This is normal.  SYMPTOMS TO WATCH FOR AND REPORT TO YOUR PHYSICIAN:  1. Abdominal pain or bloating, other than gas cramps.  2. Chest pain.  3. Back pain.  4. Signs of infection such as: chills or fever occurring within 24 hours   after the procedure.  5. Rectal bleeding, which would show as bright red, maroon, or black stools.   (A tablespoon of blood from the rectum is not serious, especially if    hemorrhoids are present.)  6. Vomiting.  7. Weakness or dizziness.  GO DIRECTLY TO THE NEAREST EMERGENCY ROOM IF YOU HAVE ANY OF THE FOLLOWING:      Difficulty breathing              Chills and/or fever over 101 F   Persistent vomiting and/or vomiting blood   Severe abdominal pain   Severe chest pain   Black, tarry stools   Bleeding- more than one tablespoon   Any other symptom or condition that you feel may need urgent attention  Your doctor recommends these additional instructions:  If any biopsies were taken, your doctors clinic will contact you in 1 to 2   weeks with any results.  - Discharge patient to home (ambulatory).   - Patient has a contact number available for emergencies.  The signs and   symptoms of potential delayed complications were discussed with the   patient.  Return to normal activities tomorrow.  Written discharge   instructions were provided to the patient.   - Resume previous diet.   - Resume xarelto tomorrow.   - Return to primary care physician as previously scheduled.   - Repeat colonoscopy in 3 years for surveillance.  For questions, problems or results please call your physician - Darnell Hooper MD at Work:  [phone].  [facility] [place], EMERGENCY ROOM PHONE NUMBER: [phone]  IF A COMPLICATION OR EMERGENCY SITUATION ARISES AND YOU ARE UNABLE TO REACH   YOUR PHYSICIAN - GO DIRECTLY TO THE EMERGENCY ROOM.  MD Darnell Isaac MD  6/19/2025 1:39:34 PM  This report has been verified and signed electronically.  Dear patient,  As a result of recent federal legislation (The Federal Cures Act), you may   receive lab or pathology results from your procedure in your [facility]   account before your physician is able to contact you. Your physician or   their representative will relay the results to you with their   recommendations at their soonest availability.  Thank you,  [employer]ATION

## 2025-06-20 ENCOUNTER — CLINICAL SUPPORT (OUTPATIENT)
Dept: CARDIOLOGY | Facility: HOSPITAL | Age: 65
End: 2025-06-20
Payer: MEDICARE

## 2025-06-20 ENCOUNTER — CLINICAL SUPPORT (OUTPATIENT)
Dept: CARDIOLOGY | Facility: HOSPITAL | Age: 65
End: 2025-06-20
Attending: INTERNAL MEDICINE
Payer: MEDICARE

## 2025-06-20 DIAGNOSIS — I49.8 OTHER SPECIFIED CARDIAC ARRHYTHMIAS: ICD-10-CM

## 2025-06-20 PROCEDURE — 93298 REM INTERROG DEV EVAL SCRMS: CPT | Performed by: INTERNAL MEDICINE

## 2025-06-20 PROCEDURE — 93298 REM INTERROG DEV EVAL SCRMS: CPT | Mod: 26,,, | Performed by: INTERNAL MEDICINE

## 2025-06-23 LAB
ESTROGEN SERPL-MCNC: NORMAL PG/ML
INSULIN SERPL-ACNC: NORMAL U[IU]/ML
LAB AP CLINICAL INFORMATION: NORMAL
LAB AP GROSS DESCRIPTION: NORMAL
LAB AP PERFORMING LOCATION(S): NORMAL
LAB AP REPORT FOOTNOTES: NORMAL

## 2025-07-01 ENCOUNTER — RESULTS FOLLOW-UP (OUTPATIENT)
Dept: GASTROENTEROLOGY | Facility: HOSPITAL | Age: 65
End: 2025-07-01

## 2025-07-09 NOTE — PROGRESS NOTES
Electrophysiology Clinic Progress Note     EP Attending: Dr. Jose Singh   PCP - Sienna Perry MD  Cardiology:       Patient was last seen in clinic on 2/3/2025 by Dr. Singh.    Subjective:   Patient ID:   Osiel Kerr is a 65 y.o. 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 He is a 64M with HTN, HLD, tobacco use (quit 5/2024), PAF on eliquis, and R lacunar infact (4/2024, manifesting as L facial droop and dysarthria, outside TNK window). Pt has regular episodes of palpitations, lasting up to 30 minutes.     Per pt, AF diagnosed 2.5 years ago at [facility]. Reportedly diagnosed after an episode of syncope. Pt had another episode of near syncope while driving in 11/2022. Palpitations during episode of near syncope.     Echo in 6/2024 showed EF 60-65%, small ASD with R-->L shunting. A 48 hr Holter in 5/2024 showed sinus rhythm average HR 58 bpm (range  bpm), no AF.     Cardiac event monitor in 2024 showed no AF.     ILR implanted in 10/2024 for AF management.     Pt with several episodes of sustained SVT (short-RP) since implant. Now on sotalol 80 mg bid.    His [id number] Score is 4 (age, HTN, CVA) so he should continue anticoagulation indefinitely (have switched him from eliquis to xarelto for financial reasons).     Pt also with a history of presyncope/syncope preceded by palpitations. ILR has captured sustained SVT  ms. Discussed risks, benefits, indications, alternatives to invasive EPS/RFA SVT. After considering his options he has agreed to proceed.    3/25/2025: Typical AVNRT easily inducible on Isuprel.  "Status-post successful slow pathway modification.   s  Update (07/10/2025):    Today he tells me he feels great. He feels "like I am in my 20s again." He denied any palpitations, SOB, JOHNSON.     ILR remote transmissions reviewed: Reported one episode of AF on 5/2/2025 that was 12 minutes in duration. When EGM reviewed - appears sinus rhythm with salvos of PACs.     I have personally reviewed the patient's EKG today, which shows sinus rhythm at 62 bpm. VT interval is 146 ms. QRS is 84 ms. QTc is 416 ms.    Relevant Cardiac Test Results:    2D Echo (6/11/2024):    Left Ventricle: The left ventricle is normal in size. Normal wall thickness. Normal wall motion. There is normal systolic function with a visually estimated ejection fraction of 60 - 65%. There is normal diastolic function.    Right Ventricle: Normal right ventricular cavity size. Wall thickness is normal. Systolic function is normal.    Left Atrium: Agitated saline study of the atrial septum is positive, intracardiac shunt at atrial level. There is a very small (<10 bubbles) shunt present with bubbles visualized crossing the interatrial septum.    Mitral Valve: There is mild regurgitation.    Pulmonary Artery: The estimated pulmonary artery systolic pressure is 23 mmHg.    IVC/SVC: Normal venous pressure at 3 mmHg.      Current Outpatient Medications   Medication Sig    atorvastatin (LIPITOR) 80 MG tablet Take 1 tablet (80 mg total) by mouth once daily.    gabapentin (NEURONTIN) 100 MG capsule Take 1-3 capsules (100-300 mg total) by mouth nightly as needed (restless legs  (note: take 1-2 hours prior to usual onset of symptoms)).    lisinopriL 10 MG tablet Take 1 tablet (10 mg total) by mouth once daily.    metoprolol succinate (TOPROL-XL) 25 MG 24 hr tablet Take 12.5 mg by mouth.    rivaroxaban (XARELTO) 20 mg Tab Take 1 tablet (20 mg total) by mouth daily with dinner or evening meal.     No current facility-administered medications for this visit. " "      Review of Systems   Constitutional: Negative for chills, fever and malaise/fatigue.   HENT:  Negative for nosebleeds.    Cardiovascular:  Negative for chest pain, dyspnea on exertion, palpitations and syncope.   Respiratory:  Negative for shortness of breath.    Hematologic/Lymphatic: Bruises/bleeds easily.   Musculoskeletal: Negative.    Gastrointestinal:  Negative for hematochezia and melena.   Genitourinary:  Negative for hematuria.   Neurological:  Negative for dizziness and light-headedness.   Psychiatric/Behavioral: Negative.     All other systems reviewed and are negative.      Objective:     /64   Pulse 62   Ht 5' 8" (1.727 m)   Wt 67.4 kg (148 lb 9.4 oz)   BMI 22.59 kg/m²     Physical Exam  Vitals and nursing note reviewed.   Constitutional:       Appearance: Normal appearance.   HENT:      Head: Normocephalic.      Mouth/Throat:      Mouth: Mucous membranes are moist.   Eyes:      Extraocular Movements: Extraocular movements intact.   Cardiovascular:      Rate and Rhythm: Normal rate and regular rhythm.   Pulmonary:      Effort: Pulmonary effort is normal.   Musculoskeletal:         General: Normal range of motion.      Cervical back: Normal range of motion.   Skin:     General: Skin is warm.   Neurological:      General: No focal deficit present.      Mental Status: He is alert.   Psychiatric:         Mood and Affect: Mood normal.       Lab Results   Component Value Date     03/18/2025    K 4.3 03/18/2025     03/18/2025    CO2 24 03/18/2025    BUN 16 03/18/2025    CREATININE 1.2 03/18/2025    MG 2.0 04/22/2024    TSH 0.499 04/21/2024    ALT 29 07/10/2024    AST 23 07/10/2024       Lab Results   Component Value Date    HGBA1C 5.7 (H) 07/10/2024    Lab Results   Component Value Date    WBC 6.08 03/18/2025    HGB 16.2 03/18/2025    HCT 49.5 03/18/2025     03/18/2025    GRAN 4.6 07/10/2024    GRAN 63.4 07/10/2024    INR 1.0 03/18/2025    APTT 29.1 03/18/2025            "     No results found.    Assessment:     1. Paroxysmal atrial fibrillation    2. Chronic anticoagulation    3. AVNRT (AV bennie re-entry tachycardia)    4. History of cardiac radiofrequency ablation (RFA)    5. Essential hypertension        Plan:     In summary, Osiel Kerr is a 65 y.o. 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 He is 3 months s/p slow pathway modification for typical AVNRT with Dr. Singh in 3/2025). He has no recurrence or palpitations. ILR showed one episode of AF in May 2025 but EGM was reviewed and it was SR with PACs. No true AF noted. His [id number] Score is 4 (age, HTN, CVA) so he should continue anticoagulation. Continue metoprolol succinate 12.5 mg daily. BP stable on current regimen. Plan to follow up in one year, sooner if needed. Continue remote transmission of his ILR a monthly.     *A copy of this note has been sent to Dr. Singh*    Follow up in about 1 year (around 7/10/2026).    ------------------------------------------------------------------    KRYSTIAN Vela, NP-C  Cardiac Electrophysiology

## 2025-07-10 ENCOUNTER — OFFICE VISIT (OUTPATIENT)
Dept: ELECTROPHYSIOLOGY | Facility: CLINIC | Age: 65
End: 2025-07-10
Payer: MEDICARE

## 2025-07-10 ENCOUNTER — HOSPITAL ENCOUNTER (OUTPATIENT)
Dept: CARDIOLOGY | Facility: CLINIC | Age: 65
Discharge: HOME | End: 2025-07-10
Payer: MEDICARE

## 2025-07-10 VITALS
WEIGHT: 148.56 LBS | BODY MASS INDEX: 22.52 KG/M2 | DIASTOLIC BLOOD PRESSURE: 64 MMHG | HEART RATE: 62 BPM | SYSTOLIC BLOOD PRESSURE: 126 MMHG | HEIGHT: 68 IN

## 2025-07-10 DIAGNOSIS — I10 ESSENTIAL HYPERTENSION: ICD-10-CM

## 2025-07-10 DIAGNOSIS — I48.0 PAROXYSMAL ATRIAL FIBRILLATION: ICD-10-CM

## 2025-07-10 DIAGNOSIS — Z79.01 CHRONIC ANTICOAGULATION: ICD-10-CM

## 2025-07-10 DIAGNOSIS — I47.19 AVNRT (AV NODAL RE-ENTRY TACHYCARDIA): ICD-10-CM

## 2025-07-10 DIAGNOSIS — I48.0 PAROXYSMAL ATRIAL FIBRILLATION: Primary | ICD-10-CM

## 2025-07-10 DIAGNOSIS — Z98.890 HISTORY OF CARDIAC RADIOFREQUENCY ABLATION (RFA): ICD-10-CM

## 2025-07-10 LAB
OHS QRS DURATION: 84 MS
OHS QTC CALCULATION: 416 MS

## 2025-07-10 PROCEDURE — 99214 OFFICE O/P EST MOD 30 MIN: CPT | Mod: S$GLB,,, | Performed by: NURSE PRACTITIONER

## 2025-07-10 PROCEDURE — 3074F SYST BP LT 130 MM HG: CPT | Mod: CPTII,S$GLB,, | Performed by: NURSE PRACTITIONER

## 2025-07-10 PROCEDURE — 3288F FALL RISK ASSESSMENT DOCD: CPT | Mod: CPTII,S$GLB,, | Performed by: NURSE PRACTITIONER

## 2025-07-10 PROCEDURE — 1159F MED LIST DOCD IN RCRD: CPT | Mod: CPTII,S$GLB,, | Performed by: NURSE PRACTITIONER

## 2025-07-10 PROCEDURE — 3078F DIAST BP <80 MM HG: CPT | Mod: CPTII,S$GLB,, | Performed by: NURSE PRACTITIONER

## 2025-07-10 PROCEDURE — 1160F RVW MEDS BY RX/DR IN RCRD: CPT | Mod: CPTII,S$GLB,, | Performed by: NURSE PRACTITIONER

## 2025-07-10 PROCEDURE — 3008F BODY MASS INDEX DOCD: CPT | Mod: CPTII,S$GLB,, | Performed by: NURSE PRACTITIONER

## 2025-07-10 PROCEDURE — 93010 ELECTROCARDIOGRAM REPORT: CPT | Mod: S$GLB,,, | Performed by: INTERNAL MEDICINE

## 2025-07-10 PROCEDURE — 99999 PR PBB SHADOW E&M-EST. PATIENT-LVL III: CPT | Mod: PBBFAC,,, | Performed by: NURSE PRACTITIONER

## 2025-07-10 PROCEDURE — 1126F AMNT PAIN NOTED NONE PRSNT: CPT | Mod: CPTII,S$GLB,, | Performed by: NURSE PRACTITIONER

## 2025-07-10 PROCEDURE — 1101F PT FALLS ASSESS-DOCD LE1/YR: CPT | Mod: CPTII,S$GLB,, | Performed by: NURSE PRACTITIONER

## 2025-07-10 PROCEDURE — 4010F ACE/ARB THERAPY RXD/TAKEN: CPT | Mod: CPTII,S$GLB,, | Performed by: NURSE PRACTITIONER

## 2025-07-10 RX ORDER — LISINOPRIL 10 MG/1
10 TABLET ORAL DAILY
Qty: 90 TABLET | Refills: 3 | Status: SHIPPED | OUTPATIENT
Start: 2025-07-10

## 2025-07-11 LAB
OHS CV AF BURDEN PERCENT: < 1
OHS CV DC REMOTE DEVICE TYPE: NORMAL

## 2025-07-27 ENCOUNTER — CLINICAL SUPPORT (OUTPATIENT)
Dept: CARDIOLOGY | Facility: HOSPITAL | Age: 65
End: 2025-07-27
Attending: INTERNAL MEDICINE
Payer: MEDICARE

## 2025-07-27 ENCOUNTER — CLINICAL SUPPORT (OUTPATIENT)
Dept: CARDIOLOGY | Facility: HOSPITAL | Age: 65
End: 2025-07-27
Payer: MEDICARE

## 2025-07-27 DIAGNOSIS — I49.8 OTHER SPECIFIED CARDIAC ARRHYTHMIAS: ICD-10-CM

## 2025-07-27 PROCEDURE — 93298 REM INTERROG DEV EVAL SCRMS: CPT | Performed by: INTERNAL MEDICINE

## 2025-07-27 PROCEDURE — 93298 REM INTERROG DEV EVAL SCRMS: CPT | Mod: 26,,, | Performed by: INTERNAL MEDICINE

## 2025-07-28 LAB
OHS CV AF BURDEN PERCENT: < 1
OHS CV DC REMOTE DEVICE TYPE: NORMAL

## 2025-07-29 NOTE — TELEPHONE ENCOUNTER
No care due was identified.  [facility] Embedded Care Due Messages. Reference number: [id number].   7/29/2025 12:52:39 PM CDT

## 2025-07-29 NOTE — TELEPHONE ENCOUNTER
Refill Routing Note   Medication(s) are not appropriate for processing by [facility] for the following reason(s):        No active prescription written by provider    [facility] action(s):  Defer               Appointments  past 12m or future 3m with PCP    Date Provider   Last Visit   3/13/2025 Sienna Perry MD   Next Visit   9/10/2025 Sienna Perry MD   ED visits in past 90 days: 0        Note composed:1:54 PM 07/29/2025

## 2025-07-30 RX ORDER — METOPROLOL SUCCINATE 25 MG/1
12.5 TABLET, EXTENDED RELEASE ORAL
Qty: 45 TABLET | Refills: 3 | Status: SHIPPED | OUTPATIENT
Start: 2025-07-30

## 2025-08-12 RX ORDER — ATORVASTATIN CALCIUM 80 MG/1
80 TABLET, FILM COATED ORAL DAILY
Qty: 90 TABLET | Refills: 3 | Status: SHIPPED | OUTPATIENT
Start: 2025-08-12 | End: 2026-08-12

## 2025-08-27 ENCOUNTER — CLINICAL SUPPORT (OUTPATIENT)
Dept: CARDIOLOGY | Facility: HOSPITAL | Age: 65
End: 2025-08-27
Attending: INTERNAL MEDICINE
Payer: MEDICARE

## 2025-08-27 ENCOUNTER — CLINICAL SUPPORT (OUTPATIENT)
Dept: CARDIOLOGY | Facility: HOSPITAL | Age: 65
End: 2025-08-27
Payer: MEDICARE

## 2025-08-27 DIAGNOSIS — I49.8 OTHER SPECIFIED CARDIAC ARRHYTHMIAS: ICD-10-CM

## 2025-08-27 PROCEDURE — 93298 REM INTERROG DEV EVAL SCRMS: CPT | Performed by: INTERNAL MEDICINE

## 2025-08-27 PROCEDURE — 93298 REM INTERROG DEV EVAL SCRMS: CPT | Mod: 26,,, | Performed by: INTERNAL MEDICINE

## 2025-08-29 LAB
OHS CV AF BURDEN PERCENT: < 1
OHS CV DC REMOTE DEVICE TYPE: NORMAL

## 2025-09-05 ENCOUNTER — TELEPHONE (OUTPATIENT)
Dept: INTERNAL MEDICINE | Facility: CLINIC | Age: 65
End: 2025-09-05
Payer: MEDICARE

## (undated) DEVICE — ELECTRODE REM PLYHSV RETURN 9

## (undated) DEVICE — SHEATH HEMOSTASIS 8.5FR

## (undated) DEVICE — R CATH BIDIRECTIONL DF CRV 7FR

## (undated) DEVICE — CATH NAV THERMOCOUPLE 7F 4MM

## (undated) DEVICE — SCALPEL #11 BLADE STRL DISP

## (undated) DEVICE — PAD DEFIB CADENCE ADULT R2

## (undated) DEVICE — INTRO 8.5FR 63CM SRO

## (undated) DEVICE — INTRODUCER HEMOSTASIS 7.5F

## (undated) DEVICE — KIT PROBE COVER WITH GEL

## (undated) DEVICE — PAD RADI FEMORAL

## (undated) DEVICE — CUP MEDICINE GRAD STRL 2OZ

## (undated) DEVICE — PACK EP DRAPE OMC

## (undated) DEVICE — PATCH CARTO REFERENCE

## (undated) DEVICE — DRAPE PED LAP SURG 108X77IN

## (undated) DEVICE — R CATH RESPONS QPLR JSN 6F 120

## (undated) DEVICE — ADHESIVE DERMABOND ADVANCED